# Patient Record
Sex: FEMALE | Race: WHITE | Employment: FULL TIME | URBAN - METROPOLITAN AREA
[De-identification: names, ages, dates, MRNs, and addresses within clinical notes are randomized per-mention and may not be internally consistent; named-entity substitution may affect disease eponyms.]

---

## 2017-01-04 ENCOUNTER — OFFICE VISIT (OUTPATIENT)
Dept: INTERNAL MEDICINE CLINIC | Age: 52
End: 2017-01-04

## 2017-01-04 VITALS
RESPIRATION RATE: 16 BRPM | BODY MASS INDEX: 35.42 KG/M2 | HEIGHT: 66 IN | OXYGEN SATURATION: 96 % | TEMPERATURE: 98.5 F | WEIGHT: 220.4 LBS | HEART RATE: 85 BPM | DIASTOLIC BLOOD PRESSURE: 72 MMHG | SYSTOLIC BLOOD PRESSURE: 118 MMHG

## 2017-01-04 DIAGNOSIS — Z12.39 SCREENING FOR BREAST CANCER: ICD-10-CM

## 2017-01-04 DIAGNOSIS — Z23 ENCOUNTER FOR IMMUNIZATION: ICD-10-CM

## 2017-01-04 DIAGNOSIS — E66.9 OBESITY (BMI 30-39.9): ICD-10-CM

## 2017-01-04 DIAGNOSIS — I10 ESSENTIAL HYPERTENSION: Primary | ICD-10-CM

## 2017-01-04 DIAGNOSIS — K50.80 CROHN'S DISEASE OF BOTH SMALL AND LARGE INTESTINE WITHOUT COMPLICATION (HCC): ICD-10-CM

## 2017-01-04 RX ORDER — LOSARTAN POTASSIUM 25 MG/1
TABLET ORAL
Refills: 0 | COMMUNITY
Start: 2016-12-16 | End: 2017-01-04 | Stop reason: SDUPTHER

## 2017-01-04 RX ORDER — LOSARTAN POTASSIUM 25 MG/1
25 TABLET ORAL DAILY
Qty: 90 TAB | Refills: 1 | Status: SHIPPED | OUTPATIENT
Start: 2017-01-04 | End: 2017-04-20 | Stop reason: SDUPTHER

## 2017-01-04 NOTE — MR AVS SNAPSHOT
Visit Information Date & Time Provider Department Dept. Phone Encounter #  
 1/4/2017 11:00 AM Vada Bumpers, MD [facility] [phone] [id number] Upcoming Health Maintenance Date Due  
 PAP AKA CERVICAL CYTOLOGY 11/13/2017 DTaP/Tdap/Td series (2 - Td) 1/18/2022 Allergies as of 1/4/2017  Review Complete On: 1/4/2017 By: Vada Bumpers, MD  
  
 Severity Noted Reaction Type Reactions Codeine  01/04/2017    Nausea and Vomiting  
 Sulfa (Sulfonamide Antibiotics)  01/04/2017    Other (comments) Makes face flush Current Immunizations  Never Reviewed No immunizations on file. Not reviewed this visit You Were Diagnosed With   
  
 Codes Comments Essential hypertension    -  Primary ICD-10-CM: I10 
ICD-9-CM: 401.9 Crohn's disease of both small and large intestine [facility]     AEF-89-UB: K50.80 ICD-9-CM: 555.2 Obesity (BMI 30-39. 9)     ICD-10-CM: E66.9 ICD-9-CM: 278.00 Screening for breast cancer     ICD-10-CM: Z12.39 
ICD-9-CM: V76.10 Vitals BP Pulse Temp Resp Height(growth percentile) Weight(growth percentile) 118/72 (BP 1 Location: Right arm, BP Patient Position: Sitting) 85 98.5 °F (36.9 °C) (Oral) 16 5' 6\" (1.676 m) 220 lb 6.4 oz (100 kg) LMP SpO2 BMI OB Status Smoking Status 12/14/2016 96% 35.57 kg/m2 Having regular periods Former Smoker BMI and BSA Data Body Mass Index Body Surface Area 35.57 kg/m 2 2.16 m 2 Preferred Pharmacy Pharmacy Name Phone [id number], [address] [phone] Your Updated Medication List  
  
   
This list is accurate as of: 1/4/17 12:03 PM.  Always use your most recent med list.  
  
  
  
  
 losartan 25 mg tablet Commonly known as:  COZAAR Take 1 Tab by mouth daily for 90 days. Prescriptions Sent to Pharmacy  Refills  
 losartan (COZAAR) 25 mg tablet 1  
 Sig: Take 1 Tab by mouth daily for 90 days. Class: Normal  
 Pharmacy: [facility] [address] Ph #: [phone] Route: Oral  
  
To-Do List   
 01/04/2017 Imaging:  [facility] MAMMO BI SCREENING INCL CAD Referral Information Referral ID Referred By Referred To  
  
 [id number] G[facility] [address] [phone] 83 [place], [address] Visits Status Start Date End Date 1 New Request 1/4/17 1/4/18 If your referral has a status of pending review or denied, additional information will be sent to support the outcome of this decision. Introducing [facility]! Tamie Saez introduces [employer] patient portal. Now you can access parts of your medical record, email your doctor's office, and request medication refills online. 1. In your internet browser, go to [email or web address] 2. Click on the First Time User? Click Here link in the Sign In box. You will see the New Member Sign Up page. 3. Enter your [employer] Access Code exactly as it appears below. You will not need to use this code after youve completed the sign-up process. If you do not sign up before the expiration date, you must request a new code. · [employer] Access Code: [email or web address] Expires: 4/4/2017 12:02 PM 
 
4. Enter the last four digits of your Social Security Number (xxxx) and Date of Birth (mm/dd/yyyy) as indicated and click Submit. You will be taken to the next sign-up page. 5. Create a [employer] ID. This will be your My[employer]Points login ID and cannot be changed, so think of one that is secure and easy to remember. 6. Create a [employer] password. You can change your password at any time. 7. Enter your Password Reset Question and Answer. This can be used at a later time if you forget your password. 8. Enter your e-mail address. You will receive e-mail notification when new information is available in [address] 9. Click Sign Up. You can now view and download portions of your medical record. 10. Click the Download Summary menu link to download a portable copy of your medical information. If you have questions, please visit the Frequently Asked Questions section of the [employer] website. Remember, [employer] is NOT to be used for urgent needs. For medical emergencies, dial 911. Now available from your iPhone and Android! Please provide this summary of care documentation to your next provider. Your primary care clinician is listed as Chang Louise. If you have any questions after today's visit, please call [phone].

## 2017-01-04 NOTE — PROGRESS NOTES
Written by Shantel Winter, as dictated by Dr. Kayode Stroud MD.    Samanta Medeiros is a 54 y.o. female. HPI  The patient comes in today to establish care. She moved here from [place], [place] 6 months ago. She would like a refill of her HTN medication, Losartan. She denies any Hx of high cholesterol or diabetes. She needs a referral for a an ob/gyn for her pap smear and mammogram. Her last pap smear was 2 years ago and she is sexually active and all of her Pap smears have been normal.     She has Crohn's disease and she was on remicade for 8 years and she stopped taking the medication when she moved here. She has been sxs free since. She had a colonoscopy 3 years ago. She does exercise, she walks 1-1.5 miles 3 times a week. She has lost 7-8 lbs since she moved here and she weighs 220 lbs. She does not snore. She has not gotten a flu shot this year and she will get one today. She has had a recent TDAP, but she is unsure of the date. She does have seasonal allergies. She denies any heartburn, irregular BMs, or leg swelling. Patient Active Problem List   Diagnosis Code    Essential hypertension I10    Crohn's disease of both small and large intestine without complication ([facility] G46.47        No current outpatient prescriptions on file prior to visit. No current facility-administered medications on file prior to visit. Allergies   Allergen Reactions    Codeine Nausea and Vomiting    Sulfa (Sulfonamide Antibiotics) Other (comments)     Makes face flush       Past Medical History   Diagnosis Date    Hypertension        Past Surgical History   Procedure Laterality Date    Hx tonsil and adenoidectomy Bilateral     Hx appendectomy       1993    Hx cholecystectomy N/A          Social History     Social History    Marital status: SINGLE     Spouse name: N/A    Number of children: N/A    Years of education: N/A     Occupational History    Not on file.      Social History Main Topics    Smoking status: Former Smoker    Smokeless tobacco: Never Used    Alcohol use Yes    Drug use: No    Sexual activity: No     Other Topics Concern    Not on file     Social History Narrative    No narrative on file         Review of Systems   Constitutional: Negative for malaise/fatigue. HENT: Negative for congestion. Respiratory: Negative for cough and wheezing. Cardiovascular: Negative for chest pain and palpitations. Gastrointestinal: Negative for abdominal pain and heartburn. Musculoskeletal: Negative for joint pain and myalgias. Neurological: Negative for weakness and headaches. Visit Vitals    /72 (BP 1 Location: Right arm, BP Patient Position: Sitting)    Pulse 85    Temp 98.5 °F (36.9 °C) (Oral)    Resp 16    Ht 5' 6\" (1.676 m)    Wt 220 lb 6.4 oz (100 kg)    LMP 12/14/2016    SpO2 96%    BMI 35.57 kg/m2     Physical Exam   Constitutional: She is oriented to person, place, and time. No distress. Obesity   HENT:   Right Ear: External ear normal.   Left Ear: External ear normal.   Mouth/Throat: Oropharynx is clear and moist.   Eyes: Conjunctivae and EOM are normal. Right eye exhibits no discharge. Left eye exhibits no discharge. Neck: Normal range of motion. Neck supple. Cardiovascular: Normal rate and regular rhythm. Pulmonary/Chest: Effort normal and breath sounds normal. She has no wheezes. Abdominal: Soft. Bowel sounds are normal. She exhibits no distension. Lymphadenopathy:     She has no cervical adenopathy. Neurological: She is alert and oriented to person, place, and time. Skin: Skin is intact. Psychiatric: She has a normal mood and affect. Nursing note and vitals reviewed. ASSESSMENT and PLAN    ICD-10-CM ICD-9-CM    1. Essential hypertension I10 401.9 losartan (COZAAR) 25 mg tablet sent to pharmacy. She is compliant on losartan and her BP is well controlled today at 118/72.  Refill sent to the pharmacy. Recommended getting blood work done before next refill. 2. Crohn's disease of both small and large intestine without complication ([facility] O56.87 555.2 REFERRAL TO GASTROENTEROLOGY    I discussed she should become established with a GI in order to help maintain if she has any flare ups. 3. Obesity (BMI 30-39. 9) E66.9 278.00 I discussed the importance of eating healthy and continuing with her exercise regimen. 4. Screening for breast cancer Z12.39 V76.10 [facility] MAMMO BI SCREENING INCL CAD    I placed an order for her mammogram. I discussed she can follow with an ob/gyn in the future if she would like. This plan was reviewed with the patient and patient agrees. All questions were answered. This scribe documentation was reviewed by me and accurately reflects the examination and decisions made by me. This note will not be viewable in [address]

## 2017-04-20 DIAGNOSIS — I10 ESSENTIAL HYPERTENSION: ICD-10-CM

## 2017-04-21 RX ORDER — LOSARTAN POTASSIUM 25 MG/1
25 TABLET ORAL DAILY
Qty: 90 TAB | Refills: 1 | Status: SHIPPED | OUTPATIENT
Start: 2017-04-21 | End: 2017-07-20

## 2017-08-03 ENCOUNTER — OFFICE VISIT (OUTPATIENT)
Dept: INTERNAL MEDICINE CLINIC | Age: 52
End: 2017-08-03

## 2017-08-03 VITALS
RESPIRATION RATE: 15 BRPM | HEART RATE: 78 BPM | HEIGHT: 66 IN | OXYGEN SATURATION: 98 % | DIASTOLIC BLOOD PRESSURE: 86 MMHG | BODY MASS INDEX: 35.39 KG/M2 | SYSTOLIC BLOOD PRESSURE: 118 MMHG | WEIGHT: 220.2 LBS | TEMPERATURE: 98.9 F

## 2017-08-03 DIAGNOSIS — K50.118 CROHN'S COLITIS, OTHER COMPLICATION (HCC): ICD-10-CM

## 2017-08-03 DIAGNOSIS — Z12.39 SCREENING FOR MALIGNANT NEOPLASM OF BREAST: ICD-10-CM

## 2017-08-03 DIAGNOSIS — D25.9 UTERINE LEIOMYOMA, UNSPECIFIED LOCATION: ICD-10-CM

## 2017-08-03 DIAGNOSIS — N89.8 VAGINAL DISCHARGE: Primary | ICD-10-CM

## 2017-08-03 PROBLEM — K50.10 CROHN'S COLITIS (HCC): Status: ACTIVE | Noted: 2017-08-03

## 2017-08-03 PROBLEM — K50.10 CROHN'S COLITIS (HCC): Chronic | Status: ACTIVE | Noted: 2017-08-03

## 2017-08-03 RX ORDER — LOSARTAN POTASSIUM 25 MG/1
TABLET ORAL
Refills: 1 | COMMUNITY
Start: 2017-07-23 | End: 2017-10-19 | Stop reason: SDUPTHER

## 2017-08-03 RX ORDER — PREDNISONE 5 MG/1
TABLET ORAL
Refills: 0 | Status: ON HOLD | COMMUNITY
Start: 2017-07-26 | End: 2017-08-13

## 2017-08-03 RX ORDER — MESALAMINE 1.2 G/1
TABLET, DELAYED RELEASE ORAL
Refills: 11 | Status: ON HOLD | COMMUNITY
Start: 2017-07-26 | End: 2017-08-13

## 2017-08-03 NOTE — PROGRESS NOTES
This note will not be viewable in [address] Gricelda Kruse is a  50 y.o. female presents for visit. Chief Complaint   Patient presents with   [employer] Other     has discharge for about 2.5 weeks, varies in appearance reddish brown, pink tinge to milky appearance. started when patient was on vacation and states that she has a big fibroid. Vaginal Discharge    The history is provided by the patient. This is a new problem. The current episode started more than 1 week ago. The problem occurs constantly. The problem has not changed since onset. The discharge occurs spontaneously and at rest. The discharge was white, milky, thick, scant, bloody, brown, dark, clear and mucoid. She is not pregnant. She has not missed her period. Associated symptoms include abdominal pain (flare of crohns disease). Pertinent negatives include no anorexia, no diaphoresis, no fever, no abdominal swelling, no constipation, no diarrhea, no nausea, no vomiting, no dyspareunia, no dysuria, no frequency, no genital burning, no genital itching, no genital lesions, no perineal pain, no perineal odor and no painful intercourse. She has tried nothing for the symptoms. The treatment provided no relief. Her past medical history does not include irregular periods, PID, STD, ectopic pregnancy, ovarian cysts or infertility. Review of Systems   Constitutional: Positive for malaise/fatigue. Negative for chills, diaphoresis, fever and weight loss. Eyes: Negative for blurred vision. Respiratory: Negative for cough. Cardiovascular: Negative for chest pain. Gastrointestinal: Positive for abdominal pain (flare of crohns disease). Negative for anorexia, constipation, diarrhea, nausea and vomiting. Genitourinary: Positive for vaginal discharge. Negative for dyspareunia, dysuria, flank pain and frequency. Musculoskeletal: Positive for myalgias. Skin: Negative for itching and rash.    Neurological: Negative for dizziness, tingling, tremors, sensory change, speech change and weakness. Psychiatric/Behavioral: Negative for depression. Visit Vitals    /86 (BP 1 Location: Left arm, BP Patient Position: Sitting)    Pulse 78    Temp 98.9 °F (37.2 °C) (Oral)    Resp 15    Ht 5' 6\" (1.676 m)    Wt 220 lb 3.2 oz (99.9 kg)    LMP 07/13/2017  Comment: states it is every other month now. and shorter    SpO2 98%    BMI 35.54 kg/m2     Physical Exam   Constitutional: She is oriented to person, place, and time. She appears well-developed and well-nourished. No distress. HENT:   Head: Normocephalic and atraumatic. Right Ear: External ear normal.   Left Ear: External ear normal.   Eyes: Conjunctivae are normal. Right eye exhibits no discharge. Left eye exhibits no discharge. No scleral icterus. Neck: Neck supple. Cardiovascular: Normal rate. Pulmonary/Chest: Effort normal and breath sounds normal. No respiratory distress. Abdominal: Soft. Bowel sounds are normal. She exhibits no distension. There is no tenderness. Genitourinary: Vaginal discharge found. Musculoskeletal: She exhibits no edema or tenderness. Neurological: She is alert and oriented to person, place, and time. Skin: Skin is warm and dry. She is not diaphoretic. Psychiatric: She has a normal mood and affect. Her behavior is normal. Judgment and thought content normal.             No results found for this or any previous visit (from the past 24 hour(s)). Patient Active Problem List    Diagnosis Date Noted    Uterine leiomyoma 2010, 2015 08/03/2017    Crohn's colitis [facility]) fissure 2007 08/03/2017    Essential hypertension 01/04/2017    Crohn's disease of both small and large intestine without complication ([facility] 28/03/2024         ASSESSMENT AND PLAN:      ICD-10-CM ICD-9-CM   1. Vaginal discharge N89.8 623.5   2. Screening for malignant neoplasm of breast Z12.39 V76.10   3. Uterine leiomyoma, unspecified location D25.9 218.9   4.  Crohn's colitis, other complication ([facility] L50.453 555.1     Orders Placed This Encounter    [facility] MAMMOGRAM SCREENING DIGITAL BILAT     Standing Status:   Future     Standing Expiration Date:   9/3/2018     Order Specific Question:   Reason for Exam     Answer:   Breast cancer screening     Order Specific Question:   Is Patient Pregnant? Answer:   No    NUSWAB VAGINOSIS + CANDIDA    REFERRAL TO OBSTETRICS AND GYNECOLOGY     Referral Priority:   Routine     Referral Type:   Consultation     Referral Reason:   Specialty Services Required     Referral Location:   [address] [place] [address]     Referred to Provider:   Lavon Barrera MD     Requested Specialty:   Obstetrics & Gynecology    predniSONE (DELTASONE) 5 mg tablet     Sig: TAKE 6 TABS BY MOUTH DAILY FOR 1 WEEK, THEN DROP BY 1 TAB DAILY EVERY WEEK UNTIL FINISHED     Refill:  0    LIALDA 1.2 gram delayed release tablet     Sig: TAKE 2 TABLETS BY MOUTH EVERY DAY AS DIRECTED     Refill:  11    losartan (COZAAR) 25 mg tablet     Sig: TAKE 1 TAB BY MOUTH DAILY FOR 90 DAYS. Refill:  1       Diagnoses and all orders for this visit:    1. Vaginal discharge afebrile, likely due to fibroids vs Crohns   -     REFERRAL TO OBSTETRICS AND GYNECOLOGY  -     NUSWAB VAGINOSIS + CANDIDA    2. Screening for malignant neoplasm of breast  -     [facility] MAMMOGRAM SCREENING DIGITAL BILAT; Future    3. Uterine leiomyoma, unspecified location  -     REFERRAL TO OBSTETRICS AND GYNECOLOGY    4. Crohn's colitis, other complication ([facility]  history of fissure,   Risk of fistula. And worsening fibroids. lab results and schedule of future lab studies reviewed with patient  radiology results and schedule of future radiology studies reviewed with patient    Patient advised to follow-up after she sees her OB/GYN and her GI doctor. Patient advised to have her GI doctor sent all laboratory data and paperwork to the office.   I informed her of any alarm symptoms such as worsening bleeding noticed of fecal matter as the risk of a colovaginal fistula. She voiced understanding. Follow-up Disposition:  Return in about 3 months (around 11/3/2017), or if symptoms worsen or fail to improve, for Chronic medical problems follow up. Disclaimer:  Advised her to call back or return to office if symptoms worsen/change/persist.  Discussed expected course/resolution/complications of diagnosis in detail with patient. Medication risks/benefits/alternatives discussed with patient. She was given an after visit summary which includes diagnoses, current medications, & vitals. Discussed patient instructions and advised to read to all patient instructions regarding care. She expressed understanding with the diagnosis and plan.

## 2017-08-03 NOTE — PROGRESS NOTES
Chief Complaint   Patient presents with   [employer] Other     has discharge for about 2.5 weeks, varies in appearance reddish brown, pink tinge to milky appearance. started when patient was on vacation and states that she has a big fibroid.

## 2017-08-08 DIAGNOSIS — B96.89 BACTERIAL VAGINOSIS: Primary | ICD-10-CM

## 2017-08-08 DIAGNOSIS — N76.0 BACTERIAL VAGINOSIS: Primary | ICD-10-CM

## 2017-08-08 LAB
A VAGINAE DNA VAG QL NAA+PROBE: ABNORMAL SCORE
BVAB2 DNA VAG QL NAA+PROBE: ABNORMAL SCORE
C ALBICANS DNA VAG QL NAA+PROBE: NEGATIVE
C GLABRATA DNA VAG QL NAA+PROBE: NEGATIVE
MEGA1 DNA VAG QL NAA+PROBE: ABNORMAL SCORE

## 2017-08-08 RX ORDER — METRONIDAZOLE 500 MG/1
500 TABLET ORAL 3 TIMES DAILY
Qty: 21 TAB | Refills: 0 | Status: ON HOLD | OUTPATIENT
Start: 2017-08-08 | End: 2017-08-13

## 2017-08-08 NOTE — PROGRESS NOTES
Spoke with pt and she voiced understanding. She saw GYN md last week who stated she has a good size fibroid present so she is following up with them. Stated if she doesn't feel better by Friday to call back. She voiced understanding.

## 2017-08-08 NOTE — PROGRESS NOTES
Ms Tamra Lesch,     I will start you on treatment for Bacterial vaginosis. Please start flagyl 500 mg po tid for 7 days and inform your GE doctor. Please call with questions and concerns.      Kindly,

## 2017-08-11 ENCOUNTER — HOSPITAL ENCOUNTER (EMERGENCY)
Age: 52
Discharge: HOME | End: 2017-08-11
Attending: EMERGENCY MEDICINE
Payer: COMMERCIAL

## 2017-08-11 ENCOUNTER — APPOINTMENT (OUTPATIENT)
Dept: CT IMAGING | Age: 52
End: 2017-08-11
Attending: EMERGENCY MEDICINE
Payer: COMMERCIAL

## 2017-08-11 VITALS
DIASTOLIC BLOOD PRESSURE: 73 MMHG | HEIGHT: 66 IN | HEART RATE: 86 BPM | SYSTOLIC BLOOD PRESSURE: 115 MMHG | TEMPERATURE: 98.4 F | WEIGHT: 212 LBS | OXYGEN SATURATION: 98 % | BODY MASS INDEX: 34.07 KG/M2 | RESPIRATION RATE: 18 BRPM

## 2017-08-11 DIAGNOSIS — K57.33 DIVERTICULITIS OF LARGE INTESTINE WITHOUT PERFORATION OR ABSCESS WITH BLEEDING: Primary | ICD-10-CM

## 2017-08-11 LAB
ALBUMIN SERPL BCP-MCNC: 3 G/DL (ref 3.5–5)
ALBUMIN/GLOB SERPL: 0.8 {RATIO} (ref 1.1–2.2)
ALP SERPL-CCNC: 54 U/L (ref 45–117)
ALT SERPL-CCNC: 32 U/L (ref 12–78)
ANION GAP BLD CALC-SCNC: 7 MMOL/L (ref 5–15)
APPEARANCE UR: CLEAR
AST SERPL W P-5'-P-CCNC: 10 U/L (ref 15–37)
BACTERIA URNS QL MICRO: NEGATIVE /HPF
BASOPHILS # BLD AUTO: 0 K/UL (ref 0–0.1)
BASOPHILS # BLD: 0 % (ref 0–1)
BILIRUB SERPL-MCNC: 0.7 MG/DL (ref 0.2–1)
BILIRUB UR QL: NEGATIVE
BUN SERPL-MCNC: 9 MG/DL (ref 6–20)
BUN/CREAT SERPL: 9 (ref 12–20)
CALCIUM SERPL-MCNC: 8.9 MG/DL (ref 8.5–10.1)
CHLORIDE SERPL-SCNC: 103 MMOL/L (ref 97–108)
CK MB CFR SERPL CALC: ABNORMAL % (ref 0–2.5)
CK MB SERPL-MCNC: <1 NG/ML (ref 5–25)
CK SERPL-CCNC: 13 U/L (ref 26–192)
CO2 SERPL-SCNC: 29 MMOL/L (ref 21–32)
COLOR UR: ABNORMAL
CREAT SERPL-MCNC: 0.99 MG/DL (ref 0.55–1.02)
EOSINOPHIL # BLD: 0 K/UL (ref 0–0.4)
EOSINOPHIL NFR BLD: 0 % (ref 0–7)
EPITH CASTS URNS QL MICRO: ABNORMAL /LPF
ERYTHROCYTE [DISTWIDTH] IN BLOOD BY AUTOMATED COUNT: 12.6 % (ref 11.5–14.5)
GLOBULIN SER CALC-MCNC: 3.6 G/DL (ref 2–4)
GLUCOSE SERPL-MCNC: 175 MG/DL (ref 65–100)
GLUCOSE UR STRIP.AUTO-MCNC: NEGATIVE MG/DL
HCG UR QL: NEGATIVE
HCT VFR BLD AUTO: 37.5 % (ref 35–47)
HEMOCCULT STL QL: NEGATIVE
HGB BLD-MCNC: 12.3 G/DL (ref 11.5–16)
HGB UR QL STRIP: ABNORMAL
INR PPP: 1.1 (ref 0.9–1.1)
KETONES UR QL STRIP.AUTO: NEGATIVE MG/DL
LACTATE SERPL-SCNC: 1.5 MMOL/L (ref 0.4–2)
LEUKOCYTE ESTERASE UR QL STRIP.AUTO: ABNORMAL
LIPASE SERPL-CCNC: 138 U/L (ref 73–393)
LYMPHOCYTES # BLD AUTO: 3 % (ref 12–49)
LYMPHOCYTES # BLD: 0.5 K/UL (ref 0.8–3.5)
MCH RBC QN AUTO: 30.9 PG (ref 26–34)
MCHC RBC AUTO-ENTMCNC: 32.8 G/DL (ref 30–36.5)
MCV RBC AUTO: 94.2 FL (ref 80–99)
MONOCYTES # BLD: 0.3 K/UL (ref 0–1)
MONOCYTES NFR BLD AUTO: 2 % (ref 5–13)
NEUTS SEG # BLD: 14.7 K/UL (ref 1.8–8)
NEUTS SEG NFR BLD AUTO: 95 % (ref 32–75)
NITRITE UR QL STRIP.AUTO: NEGATIVE
PH UR STRIP: 6.5 [PH] (ref 5–8)
PLATELET # BLD AUTO: 285 K/UL (ref 150–400)
POTASSIUM SERPL-SCNC: 3.6 MMOL/L (ref 3.5–5.1)
PROT SERPL-MCNC: 6.6 G/DL (ref 6.4–8.2)
PROT UR STRIP-MCNC: NEGATIVE MG/DL
PROTHROMBIN TIME: 11.1 SEC (ref 9–11.1)
RBC # BLD AUTO: 3.98 M/UL (ref 3.8–5.2)
RBC #/AREA URNS HPF: ABNORMAL /HPF (ref 0–5)
RBC MORPH BLD: ABNORMAL
SODIUM SERPL-SCNC: 139 MMOL/L (ref 136–145)
SP GR UR REFRACTOMETRY: 1 (ref 1–1.03)
TROPONIN I SERPL-MCNC: <0.04 NG/ML
UROBILINOGEN UR QL STRIP.AUTO: 0.2 EU/DL (ref 0.2–1)
WBC # BLD AUTO: 15.5 K/UL (ref 3.6–11)
WBC URNS QL MICRO: ABNORMAL /HPF (ref 0–4)

## 2017-08-11 PROCEDURE — 81001 URINALYSIS AUTO W/SCOPE: CPT | Performed by: EMERGENCY MEDICINE

## 2017-08-11 PROCEDURE — 74011250636 HC RX REV CODE- 250/636: Performed by: EMERGENCY MEDICINE

## 2017-08-11 PROCEDURE — 74011636320 HC RX REV CODE- 636/320: Performed by: RADIOLOGY

## 2017-08-11 PROCEDURE — 96361 HYDRATE IV INFUSION ADD-ON: CPT

## 2017-08-11 PROCEDURE — 81025 URINE PREGNANCY TEST: CPT | Performed by: EMERGENCY MEDICINE

## 2017-08-11 PROCEDURE — 74011250637 HC RX REV CODE- 250/637: Performed by: EMERGENCY MEDICINE

## 2017-08-11 PROCEDURE — 82272 OCCULT BLD FECES 1-3 TESTS: CPT | Performed by: EMERGENCY MEDICINE

## 2017-08-11 PROCEDURE — 74177 CT ABD & PELVIS W/CONTRAST: CPT

## 2017-08-11 PROCEDURE — 80053 COMPREHEN METABOLIC PANEL: CPT | Performed by: EMERGENCY MEDICINE

## 2017-08-11 PROCEDURE — 83605 ASSAY OF LACTIC ACID: CPT | Performed by: EMERGENCY MEDICINE

## 2017-08-11 PROCEDURE — 82550 ASSAY OF CK (CPK): CPT | Performed by: EMERGENCY MEDICINE

## 2017-08-11 PROCEDURE — 83690 ASSAY OF LIPASE: CPT | Performed by: EMERGENCY MEDICINE

## 2017-08-11 PROCEDURE — 85025 COMPLETE CBC W/AUTO DIFF WBC: CPT | Performed by: EMERGENCY MEDICINE

## 2017-08-11 PROCEDURE — 99284 EMERGENCY DEPT VISIT MOD MDM: CPT

## 2017-08-11 PROCEDURE — 36415 COLL VENOUS BLD VENIPUNCTURE: CPT | Performed by: EMERGENCY MEDICINE

## 2017-08-11 PROCEDURE — 74178 CT ABD&PLV WO CNTR FLWD CNTR: CPT

## 2017-08-11 PROCEDURE — 84484 ASSAY OF TROPONIN QUANT: CPT | Performed by: EMERGENCY MEDICINE

## 2017-08-11 PROCEDURE — 85610 PROTHROMBIN TIME: CPT | Performed by: EMERGENCY MEDICINE

## 2017-08-11 PROCEDURE — 96374 THER/PROPH/DIAG INJ IV PUSH: CPT

## 2017-08-11 RX ORDER — CIPROFLOXACIN 500 MG/1
500 TABLET ORAL
Status: COMPLETED | OUTPATIENT
Start: 2017-08-11 | End: 2017-08-11

## 2017-08-11 RX ORDER — KETOROLAC TROMETHAMINE 30 MG/ML
15 INJECTION, SOLUTION INTRAMUSCULAR; INTRAVENOUS
Status: COMPLETED | OUTPATIENT
Start: 2017-08-11 | End: 2017-08-11

## 2017-08-11 RX ORDER — METRONIDAZOLE 250 MG/1
500 TABLET ORAL
Status: COMPLETED | OUTPATIENT
Start: 2017-08-11 | End: 2017-08-11

## 2017-08-11 RX ORDER — FENTANYL CITRATE 50 UG/ML
50 INJECTION, SOLUTION INTRAMUSCULAR; INTRAVENOUS
Status: DISCONTINUED | OUTPATIENT
Start: 2017-08-11 | End: 2017-08-11 | Stop reason: HOSPADM

## 2017-08-11 RX ORDER — METRONIDAZOLE 500 MG/1
500 TABLET ORAL 3 TIMES DAILY
Qty: 12 TAB | Refills: 0 | Status: ON HOLD | OUTPATIENT
Start: 2017-08-11 | End: 2017-08-13

## 2017-08-11 RX ORDER — CIPROFLOXACIN 500 MG/1
500 TABLET ORAL 2 TIMES DAILY
Qty: 14 TAB | Refills: 0 | Status: ON HOLD | OUTPATIENT
Start: 2017-08-11 | End: 2017-08-13

## 2017-08-11 RX ADMIN — METRONIDAZOLE 500 MG: 250 TABLET, FILM COATED ORAL at 19:57

## 2017-08-11 RX ADMIN — IOPAMIDOL 100 ML: 755 INJECTION, SOLUTION INTRAVENOUS at 18:25

## 2017-08-11 RX ADMIN — CIPROFLOXACIN 500 MG: 500 TABLET, FILM COATED ORAL at 19:58

## 2017-08-11 RX ADMIN — KETOROLAC TROMETHAMINE 15 MG: 30 INJECTION, SOLUTION INTRAMUSCULAR at 19:41

## 2017-08-11 RX ADMIN — SODIUM CHLORIDE 1000 ML: 900 INJECTION, SOLUTION INTRAVENOUS at 17:38

## 2017-08-11 NOTE — DISCHARGE INSTRUCTIONS
Diverticulitis: Care Instructions  Your Care Instructions    Diverticulitis occurs when pouches form in the wall of the colon and become inflamed or infected. It can be very painful. Doctors aren't sure what causes diverticulitis. There is no proof that foods such as nuts, seeds, or berries cause it or make it worse. A low-fiber diet may cause the colon to work harder to push stool forward. Pouches may form because of this extra work. It may be hard to think about healthy eating while you're in pain. But as you recover, you might think about how you can use healthy eating for overall better health. Healthy eating may help you avoid future attacks. Follow-up care is a key part of your treatment and safety. Be sure to make and go to all appointments, and call your doctor if you are having problems. It's also a good idea to know your test results and keep a list of the medicines you take. How can you care for yourself at home? · Drink plenty of fluids, enough so that your urine is light yellow or clear like water. If you have kidney, heart, or liver disease and have to limit fluids, talk with your doctor before you increase the amount of fluids you drink. · Stick to liquids or a bland diet (plain rice, bananas, dry toast or crackers, applesauce) until you are feeling better. Then you can return to regular foods and gradually increase the amount of fiber in your diet. · Use a heating pad set on low on your belly to relieve mild cramps and pain. · Get extra rest until you are feeling better. · Be safe with medicines. Read and follow all instructions on the label. ¨ If the doctor gave you a prescription medicine for pain, take it as prescribed. ¨ If you are not taking a prescription pain medicine, ask your doctor if you can take an over-the-counter medicine. · If your doctor prescribed antibiotics, take them as directed. Do not stop taking them just because you feel better.  You need to take the full course of antibiotics. To prevent future attacks of diverticulitis  · Avoid constipation:  ¨ Include fruits, vegetables, beans, and whole grains in your diet each day. These foods are high in fiber. ¨ Drink plenty of fluids, enough so that your urine is light yellow or clear like water. If you have kidney, heart, or liver disease and have to limit fluids, talk with your doctor before you increase the amount of fluids you drink. ¨ Get some exercise every day. Build up slowly to 30 to 60 minutes a day on 5 or more days of the week. ¨ Take a fiber supplement, such as Citrucel or Metamucil, every day if needed. Read and follow all instructions on the label. ¨ Schedule time each day for a bowel movement. Having a daily routine may help. Take your time and do not strain when having a bowel movement. When should you call for help? Call 911 anytime you think you may need emergency care. For example, call if:  · You passed out (lost consciousness). · You vomit blood or what looks like coffee grounds. · You pass maroon or very bloody stools. Call your doctor now or seek immediate medical care if:  · You have severe pain or swelling in your belly. · You have a new or higher fever. · You cannot keep down fluids or medicines. · You have new pain that gets worse when you move or cough. Watch closely for changes in your health, and be sure to contact your doctor if:  · The symptoms you had when you first started feeling sick come back. · You do not get better as expected. Where can you learn more? Go to http://indira-guevara.info/. Enter H901 in the search box to learn more about \"Diverticulitis: Care Instructions. \"  Current as of: August 9, 2016  Content Version: 11.3  © 6843-8707 [employer]. Care instructions adapted under license by [employer] (which disclaims liability or warranty for this information).  If you have questions about a medical condition or this instruction, always ask your healthcare professional. [address] any warranty or liability for your use of this information. Lower Gastrointestinal Bleeding: Care Instructions  Your Care Instructions    The digestive or gastrointestinal tract goes from the mouth to the anus. It is often called the GI tract. Bleeding in the lower GI tract can happen anywhere in your small or large intestine. It can also happen in your rectum or anus. In some cases, it is caused by an infection, cancer, or inflammatory bowel disease. Or it may be caused by hemorrhoids, diverticulitis, or clotting problems. Light bleeding may not cause any symptoms at first. But if you continue to bleed for a while, you may feel very weak or tired. Sudden, heavy bleeding means you need to see a doctor right away. This kind of bleeding can be very dangerous. But it can usually be cured or controlled. The doctor may do some tests to find the cause of your bleeding. Follow-up care is a key part of your treatment and safety. Be sure to make and go to all appointments, and call your doctor if you are having problems. It's also a good idea to know your test results and keep a list of the medicines you take. How can you care for yourself at home? · Be safe with medicines. Take your medicines exactly as prescribed. Call your doctor if you think you are having a problem with your medicine. You will get more details on the specific medicines your doctor prescribes. · Do not take aspirin or other anti-inflammatory medicines, such as naproxen (Aleve) or ibuprofen (Advil, Motrin), without talking to your doctor first. Ask your doctor if it is okay to use acetaminophen (Tylenol). · Do not drink alcohol. · The bleeding may make you lose iron. So it's important to eat foods that have a lot of iron. These include red meat, shellfish, poultry, and eggs.  They also include beans, raisins, whole-grain breads, and leafy green vegetables. If you want help planning meals, you can meet with a dietitian. When should you call for help? Call 911 anytime you think you may need emergency care. For example, call if:  · You have sudden, severe belly pain. · You vomit blood or what looks like coffee grounds. · You passed out (lost consciousness). · Your stools are maroon or very bloody. Call your doctor now or seek immediate medical care if:  · You are dizzy or lightheaded, or you feel like you may faint. · Your stools are black and look like tar, or they have streaks of blood. · You have belly pain. · You vomit or have nausea. Watch closely for changes in your health, and be sure to contact your doctor if you do not get better as expected. Where can you learn more? Go to http://indira-guevara.info/. Enter T059 in the search box to learn more about \"Lower Gastrointestinal Bleeding: Care Instructions. \"  Current as of: March 20, 2017  Content Version: 11.3  © 1123-0226 [employer]. Care instructions adapted under license by [employer] (which disclaims liability or warranty for this information). If you have questions about a medical condition or this instruction, always ask your healthcare professional. [address] any warranty or liability for your use of this information. We hope that we have addressed all of your medical concerns. The examination and treatment you received in the Emergency Department were for an emergent problem and were not intended as complete care. It is important that you follow up with your healthcare provider(s) for ongoing care. If your symptoms worsen or do not improve as expected, and you are unable to reach your usual health care provider(s), you should return to the Emergency Department.       Today's healthcare is undergoing tremendous change, and patient satisfaction surveys are one of the many tools to assess the quality of medical care.  You may receive a survey from the [employer] regarding your experience in the Emergency Department. I hope that your experience has been completely positive, particularly the medical care that I provided. As such, please participate in the survey; anything less than excellent does not meet my expectations or intentions. [facility] and [employer] participate in nationally recognized quality of care measures. If your blood pressure is greater than 120/80, as reported below, we urge that you seek medical care to address the potential of high blood pressure, commonly known as hypertension. Hypertension can be hereditary or can be caused by certain medical conditions, pain, stress, or \"white coat syndrome. \"       Please make an appointment with your health care provider(s) for follow up of your Emergency Department visit. VITALS:   Patient Vitals for the past 8 hrs:   Temp Pulse Resp BP SpO2   08/11/17 1945 - - - 115/73 98 %   08/11/17 1814 - - - - 98 %   08/11/17 1647 98.4 °F (36.9 °C) 86 18 (!) 139/91 98 %          Thank you for allowing us to provide you with medical care today. We realize that you have many choices for your emergency care needs. Please choose us in the future for any continued health care needs. [address], [address] [facility].   Office: [phone]            Recent Results (from the past 24 hour(s))   OCCULT BLOOD, STOOL    Collection Time: 08/11/17  5:19 PM   Result Value Ref Range    Occult blood, stool NEGATIVE  NEG     TROPONIN I    Collection Time: 08/11/17  5:32 PM   Result Value Ref Range    Troponin-I, Qt. <0.04 <0.05 ng/mL   PROTHROMBIN TIME + INR    Collection Time: 08/11/17  5:32 PM   Result Value Ref Range    INR 1.1 0.9 - 1.1      Prothrombin time 11.1 9.0 - 11.1 sec   LIPASE    Collection Time: 08/11/17  5:32 PM   Result Value Ref Range    Lipase 138 73 - 393 U/L   LACTIC ACID    Collection Time: 08/11/17  5:32 PM   Result Value Ref Range    Lactic acid 1.5 0.4 - 2.0 MMOL/L   METABOLIC PANEL, COMPREHENSIVE    Collection Time: 08/11/17  5:32 PM   Result Value Ref Range    Sodium 139 136 - 145 mmol/L    Potassium 3.6 3.5 - 5.1 mmol/L    Chloride 103 97 - 108 mmol/L    CO2 29 21 - 32 mmol/L    Anion gap 7 5 - 15 mmol/L    Glucose 175 (H) 65 - 100 mg/dL    BUN 9 6 - 20 MG/DL    Creatinine 0.99 0.55 - 1.02 MG/DL    BUN/Creatinine ratio 9 (L) 12 - 20      GFR est AA >60 >60 ml/min/1.73m2    GFR est non-AA 59 (L) >60 ml/min/1.73m2    Calcium 8.9 8.5 - 10.1 MG/DL    Bilirubin, total 0.7 0.2 - 1.0 MG/DL    ALT (SGPT) 32 12 - 78 U/L    AST (SGOT) 10 (L) 15 - 37 U/L    Alk. phosphatase 54 45 - 117 U/L    Protein, total 6.6 6.4 - 8.2 g/dL    Albumin 3.0 (L) 3.5 - 5.0 g/dL    Globulin 3.6 2.0 - 4.0 g/dL    A-G Ratio 0.8 (L) 1.1 - 2.2     CK W/ CKMB & INDEX    Collection Time: 08/11/17  5:32 PM   Result Value Ref Range    CK 13 (L) 26 - 192 U/L    CK - MB <1.0 <3.6 NG/ML    CK-MB Index Cannot be calculated 0 - 2.5     CBC WITH AUTOMATED DIFF    Collection Time: 08/11/17  5:32 PM   Result Value Ref Range    WBC 15.5 (H) 3.6 - 11.0 K/uL    RBC 3.98 3.80 - 5.20 M/uL    HGB 12.3 11.5 - 16.0 g/dL    HCT 37.5 35.0 - 47.0 %    MCV 94.2 80.0 - 99.0 FL    MCH 30.9 26.0 - 34.0 PG    MCHC 32.8 30.0 - 36.5 g/dL    RDW 12.6 11.5 - 14.5 %    PLATELET 635 521 - 983 K/uL    NEUTROPHILS 95 (H) 32 - 75 %    LYMPHOCYTES 3 (L) 12 - 49 %    MONOCYTES 2 (L) 5 - 13 %    EOSINOPHILS 0 0 - 7 %    BASOPHILS 0 0 - 1 %    ABS. NEUTROPHILS 14.7 (H) 1.8 - 8.0 K/UL    ABS. LYMPHOCYTES 0.5 (L) 0.8 - 3.5 K/UL    ABS. MONOCYTES 0.3 0.0 - 1.0 K/UL    ABS. EOSINOPHILS 0.0 0.0 - 0.4 K/UL    ABS.  BASOPHILS 0.0 0.0 - 0.1 K/UL    RBC COMMENTS NORMOCYTIC, NORMOCHROMIC     URINALYSIS W/MICROSCOPIC    Collection Time: 08/11/17  7:06 PM   Result Value Ref Range    Color YELLOW/STRAW      Appearance CLEAR CLEAR      Specific gravity 1.005 1.003 - 1.030      pH (UA) 6.5 5.0 - 8.0      Protein NEGATIVE  NEG mg/dL    Glucose NEGATIVE  NEG mg/dL    Ketone NEGATIVE  NEG mg/dL    Bilirubin NEGATIVE  NEG      Blood SMALL (A) NEG      Urobilinogen 0.2 0.2 - 1.0 EU/dL    Nitrites NEGATIVE  NEG      Leukocyte Esterase SMALL (A) NEG      WBC PENDING /hpf    RBC PENDING /hpf    Epithelial cells PENDING /lpf    Bacteria PENDING /hpf   HCG URINE, QL    Collection Time: 08/11/17  7:06 PM   Result Value Ref Range    HCG urine, Ql. NEGATIVE  NEG         Ct Abd Pelv W Wo Cont    Result Date: 8/11/2017  EXAM:  CT ABD PELV W WO CONT INDICATION:   passing tissue/ blood w/ diarrhea/ h/o Crohn's COMPARISON: None. CONTRAST:  100 mL of Isovue-370. TECHNIQUE:  Multislice helical CT was performed from the diaphragm to the iliac crest prior to intravenous contrast administration and from the diaphragm to the symphysis pubis during uneventful rapid bolus intravenous contrast administration. Arterial, portal venous, and equilibrium phases were obtained. Oral contrast was not administered. Contiguous 5 mm axial images were reconstructed and lung and soft tissue windows were generated. Coronal and sagittal reformations were generated. CT dose reduction was achieved through use of a standardized protocol tailored for this examination and automatic exposure control for dose modulation. FINDINGS: The visualized portions of the lung bases are clear. The precontrast images demonstrate no abnormal calcifications. . Following contrast administration, liver is unremarkable. There is no biliary dilatation. Previous cholecystectomy noted. The spleen is normal. Pancreas and adrenal glands are unremarkable. Kidneys demonstrate no mass or hydronephrosis. The aorta tapers without aneurysm. There is no retroperitoneal adenopathy or mass. There is no free intraperitoneal air. Trace of ascites noted in the pelvis. . Bowel demonstrates no evidence of GI blood loss.  There is a focal area of edema at the junction of the descending colon and sigmoid colon. Diverticuli noted in this region. Thickening of the bowel wall is also noted. This is most consistent with diverticulitis which is localized in this region. Note is made of patient's history of Crohn's disease. The possibility of a localized area of Crohn's disease cannot be totally excluded, however is thought to be far less likely. The terminal ileum is unremarkable. The appendix is absent. The study of the pelvis demonstrates moderately full urinary bladder without calcification. .   There is no pelvic mass or adenopathy. Osseous structures intact. IMPRESSION: Findings are most compatible with a segment of diverticulitis without evidence of perforation or drainable abscess at the junction of the descending colon and sigmoid colon. This should be followed to exclude other etiologies. This would be atypical for Crohn's disease since the remainder of the bowel is unremarkable. There is no evidence of GI blood loss. Tigist Galo

## 2017-08-11 NOTE — ED PROVIDER NOTES
HPI Comments: 50 y.o. female with past medical history significant for HTN and cholecystectomy who presents from home with chief complaint of blood in stool. Pt complains of \"bright red blood\" in stool with associated diarrhea, syncope (2x), light headedness, and decreased PO since she woke up this morning ~07:30. Pt describes seeing bright red blood with \"tissue\" like material in the toilet bowl with numerous BMs today. Pt reports decreased PO because shortly after eating she has been having diarrhea with blood in the stool and associated pain. Pt describes 2 episodes of syncope today. First episode occurred when pt was sitting on the toilet shortly after waking up; pt recalls being on the toilet and feeling light headed and then describes waking up on the bathroom floor. The second episode was similar but pt also became diaphoretic and chilled before syncope. Pt called her PCP to report sxs and was referred to the ED for further evaluation. Pt reports GI hx with more frequent problems over the past few months, pt is followed by Dr. Lesley Gamez. Pt reports hx of a pedunculated fibroid, and reports using a tampon to rule out that as source of bleeding. Pt denies fever, HA, CP, vomiting, or confusion. There are no other acute medical concerns at this time. Social hx: Former tobacco smoker; No EtOH use. PCP: Celia Bales MD  Gastroenterologist: Kathy Cifuentes DO    Note written by [facility]. Sharla Woodward, as dictated by Mary Kay Khan MD 4:55 PM      The history is provided by the patient. No  was used. Past Medical History:   Diagnosis Date    Hypertension        Past Surgical History:   Procedure Laterality Date    HX APPENDECTOMY      1993    HX CHOLECYSTECTOMY N/A     HX TONSIL AND ADENOIDECTOMY Bilateral          No family history on file.     Social History     Social History    Marital status: SINGLE     Spouse name: N/A    Number of children: N/A    Years of education: N/A     Occupational History    Not on file. Social History Main Topics    Smoking status: Former Smoker    Smokeless tobacco: Never Used    Alcohol use Yes    Drug use: No    Sexual activity: No     Other Topics Concern    Not on file     Social History Narrative         ALLERGIES: Codeine and Sulfa (sulfonamide antibiotics)    Review of Systems   Constitutional: Positive for chills and diaphoresis. Negative for fever. Cardiovascular: Negative for chest pain. Gastrointestinal: Positive for blood in stool and diarrhea. Negative for abdominal pain and vomiting. Genitourinary: Negative for dysuria and hematuria. Musculoskeletal: Negative for back pain and neck pain. Skin: Negative for color change and rash. Neurological: Negative for headaches. Psychiatric/Behavioral: Negative for confusion. All other systems reviewed and are negative. Vitals:    08/11/17 1647   BP: (!) 139/91   Pulse: 86   Resp: 18   Temp: 98.4 °F (36.9 °C)   SpO2: 98%   Weight: 96.2 kg (212 lb)   Height: 5' 6\" (1.676 m)            Physical Exam   Constitutional: She is oriented to person, place, and time. She appears well-developed and well-nourished. No distress. NAD, AxOx4, speaking in complete sentences     Eyes: Conjunctivae are normal. Pupils are equal, round, and reactive to light. Right eye exhibits no discharge. No scleral icterus. Neck: Normal range of motion. Neck supple. No JVD present. No tracheal deviation present. Cardiovascular: Normal rate, regular rhythm, normal heart sounds and intact distal pulses. Exam reveals no gallop and no friction rub. No murmur heard. Pulmonary/Chest: Effort normal and breath sounds normal. No respiratory distress. She has no wheezes. She has no rales. She exhibits no tenderness. Abdominal: Soft. Bowel sounds are normal. There is no tenderness. There is no rebound and no guarding. Min ttp     Neg peritoneal signs; Genitourinary: No vaginal discharge found. Genitourinary Comments: Pt denies urinary/ vaginal complaints   Musculoskeletal: Normal range of motion. She exhibits no edema or tenderness. Neurological: She is alert and oriented to person, place, and time. No cranial nerve deficit. She exhibits normal muscle tone. Coordination normal.   Skin: Skin is warm and dry. No rash noted. No erythema. No pallor. Psychiatric: She has a normal mood and affect. Her behavior is normal. Thought content normal.   Nursing note and vitals reviewed. [facility]  ED Course       Procedures    Procedure Note - Rectal Exam:   5:40 PM  Performed by: Levi Castillo MD  Chaperoned by: nurse  Rectal exam performed. no stool was collected. Stool was Hemoccult tested, and found to be heme Negative. No fissure/ hemorrhoid/ impaction  The procedure took 1-15 minutes, and pt tolerated well.      7:22 PM  Pt at CT;       7:50 PM 'feeling better now'; pt told results/ agrees w/ plans; GI follow-up as scheduled on Tuesday Dr Florentin Kerr; Pt adds 'already on flagyl (BV); will give additional 4d of flagyl/ cipro rx; Maspeth Epley  results have been reviewed with her. She has been counseled regarding her diagnosis. She verbally conveys understanding and agreement of the signs, symptoms, diagnosis, treatment and prognosis and additionally agrees to Call/ Arrange follow up as recommended with Dr. Win Villalta MD in 24 - 48 hours. She also agrees with the care-plan and conveys that all of her questions have been answered. I have also put together some discharge instructions for her that include: 1) educational information regarding their diagnosis, 2) how to care for their diagnosis at home, as well a 3) list of reasons why they would want to return to the ED prior to their follow-up appointment, should their condition change or for concerns.

## 2017-08-11 NOTE — ED TRIAGE NOTES
Pt says she has Crohn's, has been having gastric issues for a month. Pt has had rectal bleeding x 3 days. Also syncope today x 2.

## 2017-08-12 ENCOUNTER — HOSPITAL ENCOUNTER (OUTPATIENT)
Age: 52
Setting detail: OBSERVATION
Discharge: HOME | DRG: 378 | End: 2017-08-17
Attending: EMERGENCY MEDICINE | Admitting: INTERNAL MEDICINE
Payer: COMMERCIAL

## 2017-08-12 ENCOUNTER — APPOINTMENT (OUTPATIENT)
Dept: GENERAL RADIOLOGY | Age: 52
DRG: 378 | End: 2017-08-12
Attending: EMERGENCY MEDICINE
Payer: COMMERCIAL

## 2017-08-12 DIAGNOSIS — I95.1 ORTHOSTATIC HYPOTENSION: ICD-10-CM

## 2017-08-12 DIAGNOSIS — K92.2 LOWER GI BLEEDING: ICD-10-CM

## 2017-08-12 DIAGNOSIS — D62 ACUTE BLOOD LOSS ANEMIA: ICD-10-CM

## 2017-08-12 DIAGNOSIS — K57.33 DIVERTICULITIS OF LARGE INTESTINE WITHOUT PERFORATION OR ABSCESS WITH BLEEDING: Primary | ICD-10-CM

## 2017-08-12 PROBLEM — K57.92 DIVERTICULITIS: Status: ACTIVE | Noted: 2017-08-12

## 2017-08-12 LAB
ALBUMIN SERPL BCP-MCNC: 2.7 G/DL (ref 3.5–5)
ALBUMIN/GLOB SERPL: 0.9 {RATIO} (ref 1.1–2.2)
ALP SERPL-CCNC: 45 U/L (ref 45–117)
ALT SERPL-CCNC: 22 U/L (ref 12–78)
ANION GAP BLD CALC-SCNC: 9 MMOL/L (ref 5–15)
AST SERPL W P-5'-P-CCNC: 9 U/L (ref 15–37)
BASOPHILS # BLD AUTO: 0 K/UL (ref 0–0.1)
BASOPHILS # BLD: 0 % (ref 0–1)
BILIRUB SERPL-MCNC: 0.5 MG/DL (ref 0.2–1)
BUN SERPL-MCNC: 9 MG/DL (ref 6–20)
BUN/CREAT SERPL: 9 (ref 12–20)
CALCIUM SERPL-MCNC: 8.3 MG/DL (ref 8.5–10.1)
CHLORIDE SERPL-SCNC: 103 MMOL/L (ref 97–108)
CO2 SERPL-SCNC: 26 MMOL/L (ref 21–32)
CREAT SERPL-MCNC: 1 MG/DL (ref 0.55–1.02)
EOSINOPHIL # BLD: 0 K/UL (ref 0–0.4)
EOSINOPHIL NFR BLD: 0 % (ref 0–7)
ERYTHROCYTE [DISTWIDTH] IN BLOOD BY AUTOMATED COUNT: 12.6 % (ref 11.5–14.5)
GLOBULIN SER CALC-MCNC: 3.1 G/DL (ref 2–4)
GLUCOSE SERPL-MCNC: 184 MG/DL (ref 65–100)
HCT VFR BLD AUTO: 30.8 % (ref 35–47)
HEMOCCULT STL QL: POSITIVE
HGB BLD-MCNC: 10.4 G/DL (ref 11.5–16)
LIPASE SERPL-CCNC: 109 U/L (ref 73–393)
LYMPHOCYTES # BLD AUTO: 8 % (ref 12–49)
LYMPHOCYTES # BLD: 1.4 K/UL (ref 0.8–3.5)
MCH RBC QN AUTO: 31.4 PG (ref 26–34)
MCHC RBC AUTO-ENTMCNC: 33.8 G/DL (ref 30–36.5)
MCV RBC AUTO: 93.1 FL (ref 80–99)
MONOCYTES # BLD: 1.1 K/UL (ref 0–1)
MONOCYTES NFR BLD AUTO: 6 % (ref 5–13)
NEUTS SEG # BLD: 15.5 K/UL (ref 1.8–8)
NEUTS SEG NFR BLD AUTO: 86 % (ref 32–75)
PLATELET # BLD AUTO: 343 K/UL (ref 150–400)
POTASSIUM SERPL-SCNC: 3.5 MMOL/L (ref 3.5–5.1)
PROT SERPL-MCNC: 5.8 G/DL (ref 6.4–8.2)
RBC # BLD AUTO: 3.31 M/UL (ref 3.8–5.2)
SODIUM SERPL-SCNC: 138 MMOL/L (ref 136–145)
WBC # BLD AUTO: 18 K/UL (ref 3.6–11)

## 2017-08-12 PROCEDURE — 74020 XR ABD FLAT/ ERECT: CPT

## 2017-08-12 PROCEDURE — 85025 COMPLETE CBC W/AUTO DIFF WBC: CPT | Performed by: EMERGENCY MEDICINE

## 2017-08-12 PROCEDURE — 74011250636 HC RX REV CODE- 250/636: Performed by: EMERGENCY MEDICINE

## 2017-08-12 PROCEDURE — 74011000250 HC RX REV CODE- 250: Performed by: EMERGENCY MEDICINE

## 2017-08-12 PROCEDURE — 85652 RBC SED RATE AUTOMATED: CPT | Performed by: INTERNAL MEDICINE

## 2017-08-12 PROCEDURE — 96361 HYDRATE IV INFUSION ADD-ON: CPT

## 2017-08-12 PROCEDURE — 80053 COMPREHEN METABOLIC PANEL: CPT | Performed by: EMERGENCY MEDICINE

## 2017-08-12 PROCEDURE — 96365 THER/PROPH/DIAG IV INF INIT: CPT

## 2017-08-12 PROCEDURE — 99284 EMERGENCY DEPT VISIT MOD MDM: CPT

## 2017-08-12 PROCEDURE — 83690 ASSAY OF LIPASE: CPT | Performed by: EMERGENCY MEDICINE

## 2017-08-12 PROCEDURE — 65270000029 HC RM PRIVATE

## 2017-08-12 PROCEDURE — 36415 COLL VENOUS BLD VENIPUNCTURE: CPT | Performed by: EMERGENCY MEDICINE

## 2017-08-12 PROCEDURE — 86900 BLOOD TYPING SEROLOGIC ABO: CPT | Performed by: EMERGENCY MEDICINE

## 2017-08-12 PROCEDURE — 82272 OCCULT BLD FECES 1-3 TESTS: CPT | Performed by: EMERGENCY MEDICINE

## 2017-08-12 PROCEDURE — 93005 ELECTROCARDIOGRAM TRACING: CPT

## 2017-08-12 RX ORDER — LEVOFLOXACIN 5 MG/ML
750 INJECTION, SOLUTION INTRAVENOUS
Status: COMPLETED | OUTPATIENT
Start: 2017-08-12 | End: 2017-08-15

## 2017-08-12 RX ORDER — SODIUM CHLORIDE 0.9 % (FLUSH) 0.9 %
5-10 SYRINGE (ML) INJECTION EVERY 8 HOURS
Status: DISCONTINUED | OUTPATIENT
Start: 2017-08-12 | End: 2017-08-17 | Stop reason: HOSPADM

## 2017-08-12 RX ORDER — SODIUM CHLORIDE 9 MG/ML
150 INJECTION, SOLUTION INTRAVENOUS CONTINUOUS
Status: DISCONTINUED | OUTPATIENT
Start: 2017-08-12 | End: 2017-08-13

## 2017-08-12 RX ORDER — SODIUM CHLORIDE 0.9 % (FLUSH) 0.9 %
5-10 SYRINGE (ML) INJECTION AS NEEDED
Status: DISCONTINUED | OUTPATIENT
Start: 2017-08-12 | End: 2017-08-17 | Stop reason: HOSPADM

## 2017-08-12 RX ORDER — METRONIDAZOLE 500 MG/100ML
500 INJECTION, SOLUTION INTRAVENOUS
Status: COMPLETED | OUTPATIENT
Start: 2017-08-12 | End: 2017-08-13

## 2017-08-12 RX ADMIN — METRONIDAZOLE 500 MG: 500 INJECTION, SOLUTION INTRAVENOUS at 23:22

## 2017-08-12 RX ADMIN — SODIUM CHLORIDE 1000 ML: 900 INJECTION, SOLUTION INTRAVENOUS at 22:34

## 2017-08-12 NOTE — IP AVS SNAPSHOT
[address] [facility] 
 
 
 [address] 
[phone] Patient: Franky Ponce MRN: [id number] :1965 You are allergic to the following Allergen Reactions Codeine Nausea and Vomiting  
    
 Sulfa (Sulfonamide Antibiotics) Other (comments) Makes face flush Immunizations Administered for This Admission Name Date  
 TB Skin Test (PPD) Intradermal  Deferred () Recent Documentation Height Weight Breastfeeding? BMI OB Status Smoking Status 1.676 m 95.3 kg No 33.89 kg/m2 Premenopausal Never Smoker Unresulted Labs Order Current Status SAMPLE TO BLOOD BANK In process THIOPURINE METHYLTRANSFERASE In process CULTURE, BODY FLUID W GRAM STAIN Preliminary result Emergency Contacts Name Discharge Info Relation Home Work Mobile [address] [facility] CAREGIVER [3] Other Relative [6] [phone] About your hospitalization You were admitted on:  2017 You last received care in the:  [facility]  MED SURG 2 You were discharged on:  2017 Unit phone number:  [phone] Why you were hospitalized Your primary diagnosis was:  Not on File Your diagnoses also included:  Diverticulitis, Brbpr (Bright Red Blood Per Rectum), Crohn's Disease Of Both Small And Large Intestine Without Complication (Hcc), Essential Hypertension Providers Seen During Your Hospitalizations Provider Role Specialty Primary office phone Nico Brooks DO Attending Provider Emergency Medicine [phone] Mathew Correia DO Attending Provider Internal Medicine [phone] Henny Ware MD Attending Provider Internal Medicine [phone] Yareli Torres MD Attending Provider Hospitalist [phone] Your Primary Care Physician (PCP) Primary Care Physician Office Phone Office Fax [address] [facility], [address] [place] [address] [phone] Follow-up Information Follow up With Details Comments Contact Info Basil Webb MD In 1 month If symptoms worsen [address] [facility] [address] 
[phone] Taylor Choudhury MD   [facility] Internal Medicine [address] 
[phone] Current Discharge Medication List  
  
CONTINUE these medications which have NOT CHANGED Dose & Instructions Dispensing Information Comments Morning Noon Evening Bedtime  
 ciprofloxacin HCl 500 mg tablet Commonly known as:  CIPRO Your last dose was: Your next dose is:    
   
   
 Dose:  500 mg Take 1 Tab by mouth two (2) times a day for 6 days. 7 day course 8/11-8/17 Quantity:  28 Tab Refills:  0  
     
   
   
   
  
 losartan 25 mg tablet Commonly known as:  COZAAR Your last dose was: Your next dose is: TAKE 1 TAB BY MOUTH DAILY FOR 90 DAYS. Refills:  1  
     
   
   
   
  
 metroNIDAZOLE 500 mg tablet Commonly known as:  FLAGYL Your last dose was: Your next dose is:    
   
   
 Dose:  500 mg Take 1 Tab by mouth three (3) times daily for 6 days. Quantity:  45 Tab Refills:  0 STOP taking these medications LIALDA 1.2 gram delayed release tablet Generic drug:  mesalamine  
   
  
 predniSONE 5 mg tablet Commonly known as:  Sharon Chill Where to Get Your Medications Information on where to get these meds will be given to you by the nurse or doctor. ! Ask your nurse or doctor about these medications  
  ciprofloxacin HCl 500 mg tablet  
 metroNIDAZOLE 500 mg tablet Discharge Instructions ACUTE DIAGNOSES: 
Diverticulitis 
anemia CHRONIC MEDICAL DIAGNOSES: 
Problem List as of 8/17/2017  Date Reviewed: 8/17/2017 Codes Class Noted - Resolved BRBPR (bright red blood per rectum) ICD-10-CM: K62.5 ICD-9-CM: 569.3  8/13/2017 - Present Diverticulitis ICD-10-CM: V38.76 
ICD-9-CM: 562.11  8/12/2017 - Present Uterine leiomyoma 2010, 2015  (Chronic) ICD-10-CM: D25.9 ICD-9-CM: 218.9  8/3/2017 - Present Crohn's colitis ([facility] fissure 2007 (Chronic) ICD-10-CM: K50.10 ICD-9-CM: 555.1  8/3/2017 - Present Essential hypertension ICD-10-CM: I10 
ICD-9-CM: 401.9  1/4/2017 - Present Crohn's disease of both small and large intestine without complication ([facility] OKR-02-QA: K50.80 ICD-9-CM: 555.2  1/4/2017 - Present DISCHARGE MEDICATIONS:  
  
 
 
· It is important that you take the medication exactly as they are prescribed. · Keep your medication in the bottles provided by the pharmacist and keep a list of the medication names, dosages, and times to be taken in your wallet. · Do not take other medications without consulting your doctor. DIET:  Regular Diet ACTIVITY: {discharge activity:17166} ADDITIONAL INFORMATION: If you experience any of the following symptoms then please call your primary care physician or return to the emergency room if you cannot get hold of your doctor: Fever, chills, nausea, vomiting, diarrhea, change in mentation, falling, bleeding, shortness of breath. FOLLOW UP CARE: 
Dr. Vinh Moore MD  you are to call and set up an appointment to see them in 2 weeks. Follow-up with Dr Nina Velez in 3 weeks Follow-up with Dr Paulina Morgan, orthopedics in 2 weeks or early if symptoms worsen. Information obtained by : 
I understand that if any problems occur once I am at home I am to contact my physician. I understand and acknowledge receipt of the instructions indicated above. Physician's or R.N.'s Signature                                                                  Date/Time Patient or Representative Signature                                                          Date/Time Discharge Orders None [email or web address] Announcement We are excited to announce that we are making your provider's discharge notes available to you in MySQL. You will see these notes when they are completed and signed by the physician that discharged you from your recent hospital stay. If you have any questions or concerns about any information you see in MySQL, please call the Health Information Department where you were seen or reach out to your Primary Care Provider for more information about your plan of care. Introducing [facility]! Dear Lashawn Thurman: Thank you for requesting a MySQL account. Our records indicate that you already have an active MySQL account. You can access your account anytime at [email or web address] Did you know that you can access your hospital and ER discharge instructions at any time in MySQL? You can also review all of your test results from your hospital stay or ER visit. Additional Information If you have questions, please visit the Frequently Asked Questions section of the MySQL website at [email or web address] Remember, MySQL is NOT to be used for urgent needs. For medical emergencies, dial 911. Now available from your iPhone and Android! General Information Please provide this summary of care documentation to your next provider. Patient Signature:  ____________________________________________________________ Date:  ____________________________________________________________  
  
Vicente Police Provider Signature:  ____________________________________________________________ Date:  ____________________________________________________________

## 2017-08-12 NOTE — IP AVS SNAPSHOT
[facility] [address] [facility] 
[phone] Patient: Maximus Tyson MRN: [id number] :1965 Current Discharge Medication List  
  
CONTINUE these medications which have NOT CHANGED Dose & Instructions Dispensing Information Comments Morning Noon Evening Bedtime  
 ciprofloxacin HCl 500 mg tablet Commonly known as:  CIPRO Your last dose was: Your next dose is:    
   
   
 Dose:  500 mg Take 1 Tab by mouth two (2) times a day for 6 days. 7 day course - Quantity:  28 Tab Refills:  0  
     
   
   
   
  
 losartan 25 mg tablet Commonly known as:  COZAAR Your last dose was: Your next dose is: TAKE 1 TAB BY MOUTH DAILY FOR 90 DAYS. Refills:  1  
     
   
   
   
  
 metroNIDAZOLE 500 mg tablet Commonly known as:  FLAGYL Your last dose was: Your next dose is:    
   
   
 Dose:  500 mg Take 1 Tab by mouth three (3) times daily for 6 days. Quantity:  45 Tab Refills:  0 STOP taking these medications LIALDA 1.2 gram delayed release tablet Generic drug:  mesalamine  
   
  
 predniSONE 5 mg tablet Commonly known as:  Kaylah Beam Where to Get Your Medications Information on where to get these meds will be given to you by the nurse or doctor. ! Ask your nurse or doctor about these medications  
  ciprofloxacin HCl 500 mg tablet  
 metroNIDAZOLE 500 mg tablet

## 2017-08-13 PROBLEM — K62.5 BRBPR (BRIGHT RED BLOOD PER RECTUM): Status: ACTIVE | Noted: 2017-08-13

## 2017-08-13 LAB
ABO + RH BLD: NORMAL
APPEARANCE UR: ABNORMAL
BACTERIA URNS QL MICRO: NEGATIVE /HPF
BILIRUB UR QL: NEGATIVE
BLOOD GROUP ANTIBODIES SERPL: NORMAL
COLOR UR: ABNORMAL
EPITH CASTS URNS QL MICRO: ABNORMAL /LPF
ERYTHROCYTE [SEDIMENTATION RATE] IN BLOOD: 43 MM/HR (ref 0–30)
GLUCOSE UR STRIP.AUTO-MCNC: NEGATIVE MG/DL
HCT VFR BLD AUTO: 25.2 % (ref 35–47)
HCT VFR BLD AUTO: 26 % (ref 35–47)
HCT VFR BLD AUTO: 27.5 % (ref 35–47)
HGB BLD-MCNC: 8.5 G/DL (ref 11.5–16)
HGB BLD-MCNC: 8.5 G/DL (ref 11.5–16)
HGB BLD-MCNC: 8.9 G/DL (ref 11.5–16)
HGB UR QL STRIP: ABNORMAL
KETONES UR QL STRIP.AUTO: ABNORMAL MG/DL
LEUKOCYTE ESTERASE UR QL STRIP.AUTO: ABNORMAL
NITRITE UR QL STRIP.AUTO: NEGATIVE
PH UR STRIP: 6 [PH] (ref 5–8)
PROT UR STRIP-MCNC: ABNORMAL MG/DL
RBC #/AREA URNS HPF: ABNORMAL /HPF (ref 0–5)
SP GR UR REFRACTOMETRY: 1.02 (ref 1–1.03)
SPECIMEN EXP DATE BLD: NORMAL
UROBILINOGEN UR QL STRIP.AUTO: 0.2 EU/DL (ref 0.2–1)
WBC URNS QL MICRO: ABNORMAL /HPF (ref 0–4)

## 2017-08-13 PROCEDURE — 36415 COLL VENOUS BLD VENIPUNCTURE: CPT | Performed by: INTERNAL MEDICINE

## 2017-08-13 PROCEDURE — 65270000029 HC RM PRIVATE

## 2017-08-13 PROCEDURE — 99218 HC RM OBSERVATION: CPT

## 2017-08-13 PROCEDURE — 74011250636 HC RX REV CODE- 250/636: Performed by: INTERNAL MEDICINE

## 2017-08-13 PROCEDURE — 89055 LEUKOCYTE ASSESSMENT FECAL: CPT | Performed by: INTERNAL MEDICINE

## 2017-08-13 PROCEDURE — 96366 THER/PROPH/DIAG IV INF ADDON: CPT

## 2017-08-13 PROCEDURE — 74011250637 HC RX REV CODE- 250/637: Performed by: INTERNAL MEDICINE

## 2017-08-13 PROCEDURE — 87427 SHIGA-LIKE TOXIN AG IA: CPT | Performed by: INTERNAL MEDICINE

## 2017-08-13 PROCEDURE — 74011250636 HC RX REV CODE- 250/636: Performed by: EMERGENCY MEDICINE

## 2017-08-13 PROCEDURE — 81001 URINALYSIS AUTO W/SCOPE: CPT | Performed by: EMERGENCY MEDICINE

## 2017-08-13 PROCEDURE — 74011000250 HC RX REV CODE- 250: Performed by: INTERNAL MEDICINE

## 2017-08-13 PROCEDURE — 80074 ACUTE HEPATITIS PANEL: CPT | Performed by: INTERNAL MEDICINE

## 2017-08-13 PROCEDURE — 96375 TX/PRO/DX INJ NEW DRUG ADDON: CPT

## 2017-08-13 PROCEDURE — 85018 HEMOGLOBIN: CPT | Performed by: INTERNAL MEDICINE

## 2017-08-13 PROCEDURE — 96361 HYDRATE IV INFUSION ADD-ON: CPT

## 2017-08-13 PROCEDURE — 87493 C DIFF AMPLIFIED PROBE: CPT | Performed by: INTERNAL MEDICINE

## 2017-08-13 PROCEDURE — 96367 TX/PROPH/DG ADDL SEQ IV INF: CPT

## 2017-08-13 RX ORDER — POTASSIUM CHLORIDE AND SODIUM CHLORIDE 900; 300 MG/100ML; MG/100ML
INJECTION, SOLUTION INTRAVENOUS CONTINUOUS
Status: DISCONTINUED | OUTPATIENT
Start: 2017-08-13 | End: 2017-08-15

## 2017-08-13 RX ORDER — MORPHINE SULFATE 4 MG/ML
2 INJECTION, SOLUTION INTRAMUSCULAR; INTRAVENOUS
Status: DISCONTINUED | OUTPATIENT
Start: 2017-08-13 | End: 2017-08-17 | Stop reason: HOSPADM

## 2017-08-13 RX ORDER — METRONIDAZOLE 500 MG/100ML
500 INJECTION, SOLUTION INTRAVENOUS EVERY 6 HOURS
Status: DISCONTINUED | OUTPATIENT
Start: 2017-08-13 | End: 2017-08-13

## 2017-08-13 RX ORDER — SODIUM CHLORIDE 0.9 % (FLUSH) 0.9 %
5-10 SYRINGE (ML) INJECTION EVERY 8 HOURS
Status: DISCONTINUED | OUTPATIENT
Start: 2017-08-13 | End: 2017-08-17 | Stop reason: HOSPADM

## 2017-08-13 RX ORDER — METRONIDAZOLE 500 MG/100ML
500 INJECTION, SOLUTION INTRAVENOUS EVERY 8 HOURS
Status: DISCONTINUED | OUTPATIENT
Start: 2017-08-13 | End: 2017-08-17 | Stop reason: HOSPADM

## 2017-08-13 RX ORDER — PREDNISONE 5 MG/1
25 TABLET ORAL DAILY
COMMUNITY
End: 2017-08-17

## 2017-08-13 RX ORDER — MESALAMINE 1.2 G/1
4.8 TABLET, DELAYED RELEASE ORAL DAILY
COMMUNITY
End: 2017-08-17

## 2017-08-13 RX ORDER — SODIUM CHLORIDE 0.9 % (FLUSH) 0.9 %
5-10 SYRINGE (ML) INJECTION AS NEEDED
Status: DISCONTINUED | OUTPATIENT
Start: 2017-08-13 | End: 2017-08-17 | Stop reason: HOSPADM

## 2017-08-13 RX ORDER — ACETAMINOPHEN 325 MG/1
650 TABLET ORAL
Status: DISCONTINUED | OUTPATIENT
Start: 2017-08-13 | End: 2017-08-17 | Stop reason: HOSPADM

## 2017-08-13 RX ORDER — CIPROFLOXACIN 500 MG/1
500 TABLET ORAL 2 TIMES DAILY
Status: ON HOLD | COMMUNITY
Start: 2017-08-11 | End: 2017-08-17

## 2017-08-13 RX ORDER — METRONIDAZOLE 500 MG/1
500 TABLET ORAL 3 TIMES DAILY
Status: ON HOLD | COMMUNITY
Start: 2017-08-11 | End: 2017-08-17

## 2017-08-13 RX ORDER — ONDANSETRON 2 MG/ML
4 INJECTION INTRAMUSCULAR; INTRAVENOUS
Status: DISCONTINUED | OUTPATIENT
Start: 2017-08-13 | End: 2017-08-17 | Stop reason: HOSPADM

## 2017-08-13 RX ORDER — MAGNESIUM CITRATE
296 SOLUTION, ORAL ORAL 2 TIMES DAILY
Status: COMPLETED | OUTPATIENT
Start: 2017-08-13 | End: 2017-08-13

## 2017-08-13 RX ORDER — DIPHENHYDRAMINE HYDROCHLORIDE 50 MG/ML
12.5 INJECTION, SOLUTION INTRAMUSCULAR; INTRAVENOUS
Status: DISCONTINUED | OUTPATIENT
Start: 2017-08-13 | End: 2017-08-17 | Stop reason: HOSPADM

## 2017-08-13 RX ORDER — LEVOFLOXACIN 5 MG/ML
750 INJECTION, SOLUTION INTRAVENOUS EVERY 24 HOURS
Status: DISCONTINUED | OUTPATIENT
Start: 2017-08-14 | End: 2017-08-17 | Stop reason: HOSPADM

## 2017-08-13 RX ADMIN — MAGESIUM CITRATE 296 ML: 1.75 LIQUID ORAL at 10:02

## 2017-08-13 RX ADMIN — Medication 10 ML: at 14:22

## 2017-08-13 RX ADMIN — POLYETHYLENE GLYCOL-3350 AND ELECTROLYTES 4000 ML: 236; 6.74; 5.86; 2.97; 22.74 POWDER, FOR SOLUTION ORAL at 10:02

## 2017-08-13 RX ADMIN — METRONIDAZOLE 500 MG: 500 INJECTION, SOLUTION INTRAVENOUS at 21:22

## 2017-08-13 RX ADMIN — ACETAMINOPHEN 650 MG: 325 TABLET ORAL at 20:45

## 2017-08-13 RX ADMIN — LEVOFLOXACIN 750 MG: 5 INJECTION, SOLUTION INTRAVENOUS at 02:32

## 2017-08-13 RX ADMIN — METRONIDAZOLE 500 MG: 500 INJECTION, SOLUTION INTRAVENOUS at 06:49

## 2017-08-13 RX ADMIN — METRONIDAZOLE 500 MG: 500 INJECTION, SOLUTION INTRAVENOUS at 14:22

## 2017-08-13 RX ADMIN — SODIUM CHLORIDE AND POTASSIUM CHLORIDE: 9; 2.98 INJECTION, SOLUTION INTRAVENOUS at 23:03

## 2017-08-13 RX ADMIN — Medication 10 ML: at 01:59

## 2017-08-13 RX ADMIN — MAGESIUM CITRATE 296 ML: 1.75 LIQUID ORAL at 17:43

## 2017-08-13 RX ADMIN — SODIUM CHLORIDE AND POTASSIUM CHLORIDE: 9; 2.98 INJECTION, SOLUTION INTRAVENOUS at 10:57

## 2017-08-13 RX ADMIN — SODIUM CHLORIDE 150 ML/HR: 900 INJECTION, SOLUTION INTRAVENOUS at 01:58

## 2017-08-13 RX ADMIN — METHYLPREDNISOLONE SODIUM SUCCINATE 40 MG: 40 INJECTION, POWDER, FOR SOLUTION INTRAMUSCULAR; INTRAVENOUS at 02:05

## 2017-08-13 NOTE — PROGRESS NOTES
TRANSFER - IN REPORT:    Verbal report received from Trevor(name) on Zaina Vasquez  being received from ER(unit) for routine progression of care      Report consisted of patients Situation, Background, Assessment and   Recommendations(SBAR). Information from the following report(s) SBAR, Kardex, Intake/Output, MAR and Recent Results was reviewed with the receiving nurse. Opportunity for questions and clarification was provided. Assessment completed upon patients arrival to unit and care assumed.

## 2017-08-13 NOTE — PROGRESS NOTES
[facility]: MED RECONCILIATION    Comments/Recommendations:      Patient was alert, oriented, and knowledgeable of her medications   Patients 5 mg prednisone is a taper and she is currently on 5 tablets a day    Medications adjusted:    · Lialda 1.2 g 2 tablets daily was changed to 4 tablets daily    Information obtained from: Patient, Rx Query    Allergies: Codeine and Sulfa (sulfonamide antibiotics)    Prior to Admission Medications:   Prior to Admission Medications   Prescriptions Last Dose Informant Patient Reported? Taking?   ciprofloxacin HCl (CIPRO) 500 mg tablet 8/12/2017 at am Self Yes Yes   Sig: Take 500 mg by mouth two (2) times a day. 7 day course 8/11-8/17   losartan (COZAAR) 25 mg tablet 8/12/2017 at am Self Yes Yes   Sig: TAKE 1 TAB BY MOUTH DAILY FOR 90 DAYS. mesalamine (LIALDA) 1.2 gram delayed release tablet 8/12/2017 at am Self Yes Yes   Sig: Take 4.8 g by mouth daily. metroNIDAZOLE (FLAGYL) 500 mg tablet 8/12/2017 at pm Self Yes Yes   Sig: Take 500 mg by mouth three (3) times daily.    predniSONE (DELTASONE) 5 mg tablet 8/12/2017 at am Self Yes Yes   Sig: Take 25 mg by mouth daily        Thank you,    Yo Saldivar [phone]: [phone]

## 2017-08-13 NOTE — PROGRESS NOTES
Dr. Evita Roldan notified that patient just had lab work done to rule out TB at lab gypsy, within the last 2 weeks, and patient does not want to do the PPD. MD stated that was fine as long as we have copies or proof of results. Patient updated, and she said we could request those results here at [facility] when lab gypsy opens tomorrow, and she will sign necessary paper work. Will pass along to oncoming shift.

## 2017-08-13 NOTE — ROUTINE PROCESS
TRANSFER - OUT REPORT:    Verbal report given to LAMAR Hernadez(name[facility] to 5th floor(unit) for routine progression of care       Report consisted of patients Situation, Background, Assessment and   Recommendations(SBAR). Information from the following report(s) SBAR, ED Summary, [facility] and Recent Results was reviewed with the receiving nurse. Lines:   Peripheral IV 08/12/17 Right Antecubital (Active)   Site Assessment Clean, dry, & intact 8/12/2017 10:31 PM   Phlebitis Assessment 0 8/12/2017 10:31 PM   Infiltration Assessment 0 8/12/2017 10:31 PM   Dressing Status Clean, dry, & intact 8/12/2017 10:31 PM   Dressing Type Transparent 8/12/2017 10:31 PM   Hub Color/Line Status Pink 8/12/2017 10:31 PM        Opportunity for questions and clarification was provided.       Patient transported with:   [employer]

## 2017-08-13 NOTE — ED NOTES
Orthostatics obtained, upon standing patient became pale and slumped down on stretcher, regained consciousness within 30 seconds or less and was able to answer questions. MD mcfarland.

## 2017-08-13 NOTE — ED TRIAGE NOTES
Patient presents for continued rectal bleeding; was here last night for the same. History of crohn's. Patient reports several episodes of BRBPR today accompanied by dizziness. Patient pale upon arrival.  Also reports vaginal bleeding.

## 2017-08-13 NOTE — ED PROVIDER NOTES
HPI Comments: 50 y.o. female with past medical history significant for HTN, Crohn's disease, Uterine fibroid who presents from home with chief complaint of blood in stool. Pt reports hx of crohn's disease. She was on remicade from 0365-8271. She has been symptom up until a couple weeks ago. Pt notes that she has had low left abdominal pain accompanied bloody diarrhea. Pt notes being evaluated in ED 1 night ago for symptoms at which tiem she was diagnosed with diverticulitis. Pt notes today that the bloody stool has increased. She has had bloody stools \"4-5x within last 3 hours\". Pt also c/o dizziness with a feeling of near syncope. She notes dysuria today but is unsure whether hematuria is present. No syncopal episode today. Pt denies any other acute medical concerns at this time. Chart review: Pt evaluated in ED yesterday for diverticulitis without perforation. Occult stool - heme negative. CT abd/pelv - diverticulitis. Pt discharged home on cipro and flagyl. PCP: Elian Suarez MD  GI: Jose R Rivero MD (Colonoscopy scheduled for this coming Tuesday)    Note written by Lila Snell, as dictated by Sydnee Torres DO 10:22 PM    The history is provided by the patient. No  was used. Past Medical History:   Diagnosis Date    Crohn's colitis ([address]     Hypertension        Past Surgical History:   Procedure Laterality Date    HX APPENDECTOMY      1993    HX CHOLECYSTECTOMY N/A     HX TONSIL AND ADENOIDECTOMY Bilateral          History reviewed. No pertinent family history. Social History     Social History    Marital status: SINGLE     Spouse name: N/A    Number of children: N/A    Years of education: N/A     Occupational History    Not on file.      Social History Main Topics    Smoking status: Former Smoker    Smokeless tobacco: Never Used    Alcohol use Yes    Drug use: No    Sexual activity: No     Other Topics Concern    Not on file Social History Narrative     ALLERGIES: Codeine and Sulfa (sulfonamide antibiotics)    Review of Systems   Constitutional: Negative for appetite change, chills, fever and unexpected weight change. HENT: Negative for ear pain, hearing loss, rhinorrhea and trouble swallowing. Eyes: Negative for pain and visual disturbance. Respiratory: Negative for cough, chest tightness and shortness of breath. Cardiovascular: Negative for chest pain and palpitations. Gastrointestinal: Positive for abdominal pain (LLQ), blood in stool and diarrhea. Negative for abdominal distention, nausea and vomiting. Genitourinary: Positive for dysuria. Negative for hematuria and urgency. Musculoskeletal: Negative for back pain and myalgias. Skin: Negative for rash. Neurological: Positive for dizziness. Negative for syncope, weakness and numbness. Psychiatric/Behavioral: Negative for confusion and suicidal ideas. All other systems reviewed and are negative. Vitals:    08/12/17 2209   BP: 94/58   Pulse: 85   Resp: 18   Temp: 98.4 °F (36.9 °C)   SpO2: 98%   Weight: 95.3 kg (210 lb)   Height: 5' 6\" (1.676 m)            Physical Exam   Constitutional: She is oriented to person, place, and time. She appears well-developed and well-nourished. No distress. HENT:   Head: Normocephalic and atraumatic. Right Ear: External ear normal.   Left Ear: External ear normal.   Nose: Nose normal.   Mouth/Throat: Oropharynx is clear and moist. No oropharyngeal exudate. Eyes: Conjunctivae and EOM are normal. Pupils are equal, round, and reactive to light. Right eye exhibits no discharge. Left eye exhibits no discharge. No scleral icterus. Neck: Normal range of motion. Neck supple. No JVD present. No tracheal deviation present. Cardiovascular: Normal rate, regular rhythm, normal heart sounds and intact distal pulses. Exam reveals no gallop and no friction rub. No murmur heard.   Pulmonary/Chest: Effort normal and breath sounds normal. No stridor. No respiratory distress. She has no decreased breath sounds. She has no wheezes. She has no rhonchi. She has no rales. She exhibits no tenderness. Abdominal: Soft. Bowel sounds are normal. She exhibits no distension. There is tenderness in the left lower quadrant. There is no rebound and no guarding. Genitourinary:   Genitourinary Comments: + rectal bleeding   Musculoskeletal: Normal range of motion. She exhibits no edema or tenderness. Neurological: She is alert and oriented to person, place, and time. She has normal strength and normal reflexes. No cranial nerve deficit or sensory deficit. She exhibits normal muscle tone. Coordination normal. GCS eye subscore is 4. GCS verbal subscore is 5. GCS motor subscore is 6. Skin: Skin is warm and dry. No rash noted. She is not diaphoretic. No erythema. There is pallor. Psychiatric: She has a normal mood and affect. Her behavior is normal. Judgment and thought content normal.   Nursing note and vitals reviewed.    Note written by Lila Zhou, as dictated by Angelito Bro DO 10:26 PM    MDM  Number of Diagnoses or Management Options  Acute blood loss anemia:   Diverticulitis of large intestine without perforation or abscess with bleeding:   Lower GI bleeding:   Orthostatic hypotension:      Amount and/or Complexity of Data Reviewed  Clinical lab tests: ordered and reviewed  Tests in the radiology section of CPT®: ordered and reviewed  Tests in the medicine section of CPT®: ordered and reviewed  Review and summarize past medical records: yes  Discuss the patient with other providers: yes (Hospitalist)  Independent visualization of images, tracings, or specimens: yes (ekg)    Risk of Complications, Morbidity, and/or Mortality  Presenting problems: moderate  Diagnostic procedures: moderate  Management options: moderate    Critical Care  Total time providing critical care: 30-74 minutes (40 minutes of CCT regardless of any procedures that might have been done.)    Patient Progress  Patient progress: stable    ED Course     CONSULT NOTE:  11:51 PM Zoraida Espino DO spoke with Dr. Tiffanie Barakat, Consult for Hospitalist.  Discussed available diagnostic tests and clinical findings. Dr. Tiffanie Barakat will see and admit patient. Procedures   Chief Complaint   Patient presents with    Rectal Bleeding    Dizziness       12:12 AM  The patients presenting problems have been discussed, and they are in agreement with the care plan formulated and outlined with them. I have encouraged them to ask questions as they arise throughout their visit. MEDICATIONS GIVEN:  Medications   sodium chloride (NS) flush 5-10 mL (not administered)   sodium chloride (NS) flush 5-10 mL (not administered)   levoFLOXacin (LEVAQUIN) 750 mg in D5W IVPB (not administered)   metroNIDAZOLE (FLAGYL) IVPB premix 500 mg (500 mg IntraVENous New Bag 8/12/17 4522)   0.9% sodium chloride infusion (not administered)   sodium chloride 0.9 % bolus infusion 1,000 mL (1,000 mL IntraVENous New Bag 8/12/17 2234)   sodium chloride 0.9 % bolus infusion 1,000 mL (1,000 mL IntraVENous New Bag 8/12/17 2234)       LABS REVIEWED:  Recent Results (from the past 24 hour(s))   CBC WITH AUTOMATED DIFF    Collection Time: 08/12/17 10:25 PM   Result Value Ref Range    WBC 18.0 (H) 3.6 - 11.0 K/uL    RBC 3.31 (L) 3.80 - 5.20 M/uL    HGB 10.4 (L) 11.5 - 16.0 g/dL    HCT 30.8 (L) 35.0 - 47.0 %    MCV 93.1 80.0 - 99.0 FL    MCH 31.4 26.0 - 34.0 PG    MCHC 33.8 30.0 - 36.5 g/dL    RDW 12.6 11.5 - 14.5 %    PLATELET 878 833 - 708 K/uL    NEUTROPHILS 86 (H) 32 - 75 %    LYMPHOCYTES 8 (L) 12 - 49 %    MONOCYTES 6 5 - 13 %    EOSINOPHILS 0 0 - 7 %    BASOPHILS 0 0 - 1 %    ABS. NEUTROPHILS 15.5 (H) 1.8 - 8.0 K/UL    ABS. LYMPHOCYTES 1.4 0.8 - 3.5 K/UL    ABS. MONOCYTES 1.1 (H) 0.0 - 1.0 K/UL    ABS. EOSINOPHILS 0.0 0.0 - 0.4 K/UL    ABS.  BASOPHILS 0.0 0.0 - 0.1 K/UL   METABOLIC PANEL, COMPREHENSIVE Collection Time: 08/12/17 10:25 PM   Result Value Ref Range    Sodium 138 136 - 145 mmol/L    Potassium 3.5 3.5 - 5.1 mmol/L    Chloride 103 97 - 108 mmol/L    CO2 26 21 - 32 mmol/L    Anion gap 9 5 - 15 mmol/L    Glucose 184 (H) 65 - 100 mg/dL    BUN 9 6 - 20 MG/DL    Creatinine 1.00 0.55 - 1.02 MG/DL    BUN/Creatinine ratio 9 (L) 12 - 20      GFR est AA >60 >60 ml/min/1.73m2    GFR est non-AA 59 (L) >60 ml/min/1.73m2    Calcium 8.3 (L) 8.5 - 10.1 MG/DL    Bilirubin, total 0.5 0.2 - 1.0 MG/DL    ALT (SGPT) 22 12 - 78 U/L    AST (SGOT) 9 (L) 15 - 37 U/L    Alk. phosphatase 45 45 - 117 U/L    Protein, total 5.8 (L) 6.4 - 8.2 g/dL    Albumin 2.7 (L) 3.5 - 5.0 g/dL    Globulin 3.1 2.0 - 4.0 g/dL    A-G Ratio 0.9 (L) 1.1 - 2.2     LIPASE    Collection Time: 08/12/17 10:25 PM   Result Value Ref Range    Lipase 109 73 - 393 U/L   OCCULT BLOOD, STOOL    Collection Time: 08/12/17 10:25 PM   Result Value Ref Range    Occult blood, stool POSITIVE (A) NEG     EKG, 12 LEAD, INITIAL    Collection Time: 08/12/17 10:32 PM   Result Value Ref Range    Ventricular Rate 58 BPM    Atrial Rate 58 BPM    P-R Interval 112 ms    QRS Duration 82 ms    Q-T Interval 426 ms    QTC Calculation (Bezet) 418 ms    Calculated P Axis 2 degrees    Calculated R Axis 8 degrees    Calculated T Axis 36 degrees    Diagnosis       Sinus bradycardia  Otherwise normal ECG  No previous ECGs available         VITAL SIGNS:  Patient Vitals for the past 12 hrs:   Temp Pulse Resp BP SpO2   08/12/17 2330 - 70 22 100/53 100 %   08/12/17 2300 - 65 22 97/51 100 %   08/12/17 2230 - (!) 51 19 93/52 100 %   08/12/17 2209 98.4 °F (36.9 °C) 85 18 94/58 98 %       RADIOLOGY RESULTS:  The following have been ordered and reviewed:  XR ABD FLAT/ ERECT   Final Result        Study Result      INDICATION: Abdominal pain     FINDINGS: Lateral decubitus and supine views of the abdomen demonstrate a  nonobstructive bowel gas pattern.  Surgical clips are present in the right upper  quadrant of the abdomen. There is no intraperitoneal free air. No soft tissue  mass or pathological soft tissue calcification is seen. The osseous structures  are unremarkable.     IMPRESSION  IMPRESSION: No evidence of acute abdominal process. ED EKG interpretation:  Rhythm: sinus bradycardia; and regular . Rate (approx.): 58; Axis: normal; P wave: normal; QRS interval: normal ; ST/T wave: normal; Other findings: normal. This EKG was interpreted by Ally Martin DO,ED Provider. CONSULTATIONS:   Hospitalist    PROGRESS NOTES:  Discussed results and plan with patient. Patient will be admitted/observed for further evaluation and treatment. DIAGNOSIS:    1. Diverticulitis of large intestine without perforation or abscess with bleeding    2. Acute blood loss anemia    3. Orthostatic hypotension    4. Lower GI bleeding        PLAN:  Admit/obs    ED COURSE: The patients hospital course has been uncomplicated.

## 2017-08-13 NOTE — ED NOTES
Patient better after syncopal episode. Awake and alert. Patient to x-ray, advised not to sit up quickly or stand.

## 2017-08-13 NOTE — PROGRESS NOTES
Bedside and Verbal shift change report given to Herbert Rodriguez (oncoming nurse) by Shreya Savage (offgoing nurse). Report included the following information SBAR, Kardex, Intake/Output, MAR and Recent Results.

## 2017-08-13 NOTE — CONSULTS
Gastroenterology Consult     Referring Physician: Kris Felder    Consult Date: 8/13/2017     Subjective:Bleeding     Chief Complaint: rectal bleeding    History of Present Illness: Zaina Vasquez is a 50 y.o. female who is seen in consultation for rectal bleeding with blood loss anemia. Personal history Crohn's disease with all therapy given while in [place] and subsequently [place]. Had been using in past mesalamine, corticosteroids, Remicade. Was well and discontinued medications over a year ago. About a month ago began with lower abdominal pains and then progressively severe rectal bleeding. A couple weeks ago began 25 mg daily prednisone, mesalamine. Symptoms unchanged; comes to ER for progressive symptoms. I have discussed medications useable Crohn's disease, potential short and long term side effects; all questions answered. She understands it critical to ascertain definitive diagnosis and very potent immunosuppression in setting of acute infection could make condition much worse. Past Medical History:   Diagnosis Date    Crohn's colitis ([facility] [address]     Hypertension      Past Surgical History:   Procedure Laterality Date    HX APPENDECTOMY      1993    HX CHOLECYSTECTOMY N/A     HX TONSIL AND ADENOIDECTOMY Bilateral       History reviewed. No pertinent family history.   Social History   Substance Use Topics    Smoking status: Former Smoker    Smokeless tobacco: Never Used    Alcohol use Yes      Allergies   Allergen Reactions    Codeine Nausea and Vomiting    Sulfa (Sulfonamide Antibiotics) Other (comments)     Makes face flush     Current Facility-Administered Medications   Medication Dose Route Frequency    sodium chloride (NS) flush 5-10 mL  5-10 mL IntraVENous Q8H    sodium chloride (NS) flush 5-10 mL  5-10 mL IntraVENous PRN    metroNIDAZOLE (FLAGYL) IVPB premix 500 mg  500 mg IntraVENous Q6H    [START ON 8/14/2017] levoFLOXacin (LEVAQUIN) 750 mg in D5W IVPB  750 mg IntraVENous Q24H    ondansetron (ZOFRAN) injection 4 mg  4 mg IntraVENous Q4H PRN    diphenhydrAMINE (BENADRYL) injection 12.5 mg  12.5 mg IntraVENous Q4H PRN    acetaminophen (TYLENOL) tablet 650 mg  650 mg Oral Q4H PRN    morphine injection 2 mg  2 mg IntraVENous Q4H PRN    magnesium citrate solution 296 mL  296 mL Oral BID    sodium chloride (NS) flush 5-10 mL  5-10 mL IntraVENous Q8H    sodium chloride (NS) flush 5-10 mL  5-10 mL IntraVENous PRN    0.9% sodium chloride infusion  150 mL/hr IntraVENous CONTINUOUS        Review of Systems:  A detailed 10 organ review of systems is obtained with pertinent positives as listed in the History of Present Illness and Past Medical History. All others are negative. Objective:     Physical Exam:  Visit Vitals    BP 97/53 (BP 1 Location: Left arm, BP Patient Position: At rest)    Pulse 77    Temp 98.8 °F (37.1 °C)    Resp 20    Ht 5' 6\" (1.676 m)    Wt 95.3 kg (210 lb)    LMP 08/12/2017    SpO2 98%    Breastfeeding No    BMI 33.89 kg/m2        Skin:  Extremities and face reveal no rashes. No may erythema. No telangiectasias on the chest wall. HEENT: Sclerae anicteric. Extra-occular muscles are intact. No oral ulcers. No abnormal pigmentation of the lips. The neck is supple. Cardiovascular: Regular rate and rhythm. No murmurs, gallops, or rubs. PMI nondisplaced. Carotids without bruits. Respiratory:  Comfortable breathing with no accessory muscle use. Clear breath sounds with no wheezes, rales, or rhonchi. GI:  Abdomen nondistended, soft, and nontender. Normal active bowel sounds. No enlargement of the liver or spleen. No masses palpable. Musculoskeletal:  No pitting edema of the lower legs. Extremities have good range of motion. No costovertebral tenderness. Neurological:  Gross memory appears intact. Patient is alert and oriented. Psychiatric:  Mood appears appropriate with judgement intact.   Lymphatic:  No cervical or supraclavicular adenopathy. Lab/Data Review:  CMP:   Lab Results   Component Value Date/Time     08/12/2017 10:25 PM    K 3.5 08/12/2017 10:25 PM     08/12/2017 10:25 PM    CO2 26 08/12/2017 10:25 PM    AGAP 9 08/12/2017 10:25 PM     (H) 08/12/2017 10:25 PM    BUN 9 08/12/2017 10:25 PM    CREA 1.00 08/12/2017 10:25 PM    GFRAA >60 08/12/2017 10:25 PM    GFRNA 59 (L) 08/12/2017 10:25 PM    CA 8.3 (L) 08/12/2017 10:25 PM    ALB 2.7 (L) 08/12/2017 10:25 PM    TP 5.8 (L) 08/12/2017 10:25 PM    GLOB 3.1 08/12/2017 10:25 PM    AGRAT 0.9 (L) 08/12/2017 10:25 PM    SGOT 9 (L) 08/12/2017 10:25 PM    ALT 22 08/12/2017 10:25 PM     CBC:   Lab Results   Component Value Date/Time    WBC 18.0 (H) 08/12/2017 10:25 PM    HGB 8.5 (L) 08/13/2017 07:03 AM    HCT 26.0 (L) 08/13/2017 07:03 AM     08/12/2017 10:25 PM     CT scan: The visualized portions of the lung bases are clear.     The precontrast images demonstrate no abnormal calcifications. .     Following contrast administration, liver is unremarkable. There is no biliary  dilatation. Previous cholecystectomy noted. The spleen is normal. Pancreas and  adrenal glands are unremarkable. Kidneys demonstrate no mass or hydronephrosis.     The aorta tapers without aneurysm. There is no retroperitoneal adenopathy or  mass. There is no free intraperitoneal air. Trace of ascites noted in the  pelvis. .     Bowel demonstrates no evidence of GI blood loss. There is a focal area of edema  at the junction of the descending colon and sigmoid colon. Diverticuli noted in  this region. Thickening of the bowel wall is also noted. This is most consistent  with diverticulitis which is localized in this region. Note is made of patient's  history of Crohn's disease. The possibility of a localized area of Crohn's  disease cannot be totally excluded, however is thought to be far less likely. The terminal ileum is unremarkable.  The appendix is absent.      The study of the pelvis demonstrates moderately full urinary bladder without  calcification. .   There is no pelvic mass or adenopathy      Assessment/Plan:     Active Problems:    Essential hypertension (1/4/2017)      Crohn's disease of both small and large intestine without complication ([facility] [address] (6/3/8389)      Diverticulitis (8/12/2017)      BRBPR (bright red blood per rectum) (8/13/2017)     GI bleed; CT implies diagnosis other than regional enteritis    Blood loss anemia  Recommend:    TPMT  PPD  I've discussed colonoscopy possible biopsy, polypectomy, cautery, injection, alternatives, complications including but not limited to pain, cardiopulmonary event, bleeding, perforation requiring additional blood transfusion or operative repair; all questions answered.     Thanks

## 2017-08-13 NOTE — H&P
[address]  [phone]    Hospitalist Admission Note      NAME:  Rose Zaragoza   :   1965   MRN:  [id number]     PCP:  Lotus Mckenna MD     Date/Time:  2017 12:28 AM          Subjective:     CHIEF COMPLAINT: BRBPR, abd pain     HISTORY OF PRESENT ILLNESS:     Ms. Spike Wu is a 50 y.o.  female with a hx of uterine fibroids, crohn's disease, HTN who presented to the Emergency Department complaining of 2 weeks of progressive abdominal pain/cramping in lower quadrants followed by onset of increasing number of bloody bowel movements over the last 2 days followed by dizziness and near syncope. Patient was on remicade for a long period of time and has been off since  and sx free on Lialda until 1 month ago when seen by GI and started on prednisone taper with plan for C-scope next week. Seen in ED 1 day ago and given cipro/metro but sx persistent and so was bleeding. We will admit for further management. Past Medical History:   Diagnosis Date    Crohn's colitis ([address]     Hypertension         Past Surgical History:   Procedure Laterality Date    HX APPENDECTOMY          HX CHOLECYSTECTOMY N/A     HX TONSIL AND ADENOIDECTOMY Bilateral        Social History   Substance Use Topics    Smoking status: Former Smoker    Smokeless tobacco: Never Used    Alcohol use Yes        No family hx of GI malignancy     Allergies   Allergen Reactions    Codeine Nausea and Vomiting    Sulfa (Sulfonamide Antibiotics) Other (comments)     Makes face flush        Prior to Admission medications    Medication Sig Start Date End Date Taking? Authorizing Provider   ciprofloxacin HCl (CIPRO) 500 mg tablet Take 1 Tab by mouth two (2) times a day for 7 days. 17  Arun Calvert MD   metroNIDAZOLE (FLAGYL) 500 mg tablet Take 1 Tab by mouth three (3) times daily for 4 days.  8/11/17 8/15/17  Arun Calvert MD   metroNIDAZOLE (FLAGYL) 500 mg tablet Take 1 Tab by mouth three (3) times daily.  Indications: BACTERIAL VAGINOSIS 8/8/17   Germania Garg MD   predniSONE (DELTASONE) 5 mg tablet TAKE 6 TABS BY MOUTH DAILY FOR 1 WEEK, THEN DROP BY 1 TAB DAILY EVERY WEEK UNTIL FINISHED 7/26/17   Historical Provider   LIALDA 1.2 gram delayed release tablet TAKE 2 TABLETS BY MOUTH EVERY DAY AS DIRECTED 7/26/17   Historical Provider   losartan (COZAAR) 25 mg tablet TAKE 1 TAB BY MOUTH DAILY FOR 90 DAYS. 7/23/17   Historical Provider       Review of Systems:  (bold if positive, if negative)    Gen:  Eyes:  ENT:  CVS:  Pulm:  GI:  Abdominal pain, nausea BRBPR  :    MS:  Skin:  Psych:  Endo:    Hem:  Renal:    Neuro:        Objective:      VITALS:    Vital signs reviewed; most recent are:    Visit Vitals    /53    Pulse 70    Temp 98.4 °F (36.9 °C)    Resp 22    Ht 5' 6\" (1.676 m)    Wt 95.3 kg (210 lb)    SpO2 100%    BMI 33.89 kg/m2     SpO2 Readings from Last 6 Encounters:   08/12/17 100%   08/11/17 98%   08/03/17 98%   01/04/17 96%        No intake or output data in the 24 hours ending 08/13/17 0028     Exam:     Physical Exam:    Gen:  Well-developed, well-nourished, in no acute distress  HEENT:  Pale conjunctivae, PERRL, hearing intact to voice, dry mucous membranes  Neck:  Supple, without masses, thyroid non-tender  Resp:  No accessory muscle use, clear breath sounds without wheezes rales or rhonchi  Card:  No murmurs, normal S1, S2 without thrills, bruits or peripheral edema  Abd:  Soft, ttp in lower quadrants, non-distended, hyperactive bowel sounds are present, no palpable organomegaly and no detectable hernias  Lymph:  No cervical or inguinal adenopathy  Musc:  No cyanosis or clubbing  Skin:  No rashes or ulcers, skin turgor is good  Neuro:  Cranial nerves are grossly intact, no focal motor weakness, follows commands appropriately  Psych:  Good insight, oriented to person, place and time, alert     Labs:    Recent Labs 08/12/17   2225   WBC  18.0*   HGB  10.4*   HCT  30.8*   PLT  343     Recent Labs      08/12/17   2225   NA  138   K  3.5   CL  103   CO2  26   GLU  184*   BUN  9   CREA  1.00   CA  8.3*   ALB  2.7*   TBILI  0.5   SGOT  9*   ALT  22     No results found for: GLUCPOC  No results for input(s): PH, PCO2, PO2, HCO3, FIO2 in the last 72 hours. Recent Labs      08/11/17   1732   INR  1.1     All Micro Results     Procedure Component Value Units Date/Time    C. DIFFICILE (DNA) [[id number]]     Order Status:  Sent         CT A/P\"    IMPRESSION: Findings are most compatible with a segment of diverticulitis  without evidence of perforation or drainable abscess at the junction of the  descending colon and sigmoid colon. This should be followed to exclude other  etiologies. This would be atypical for Crohn's disease since the remainder of  the bowel is unremarkable. There is no evidence of GI blood loss. .    I have reviewed previous records       Assessment and Plan: Active Problems:    Crohn's disease of both small and large intestine without complication / Diverticulitis / BRBPR (bright red blood per rectum) / Acute blood loss anemia. May represent a flare of IBD. Admit to medicine. GI consult. T&S and serial H/H. Transfuse for hgb less than 7. IV levofloxacin and flagyl. IVFs. Continue Lialda. On steroid taper, will hold prednisone and given methylpred 40 mg once, redosing per GI. Check C.diff, stool cx, stool WBC. Check ESR. Essential hypertension. Low-normal. Hold cozaar.     Telemetry reviewed:   normal sinus rhythm    Risk of deterioration: high      Total time spent with patient: 79 895 [address] discussed with: Patient and Nursing Staff    Discussed:  Care Plan       ___________________________________________________    Attending Physician: Ana Clark,

## 2017-08-13 NOTE — PROGRESS NOTES
Primary Nurse Pedro Pablo Acosta RN and Daniel Machuca RN performed a dual skin assessment on this patient No impairment noted  Ilan score is 22

## 2017-08-13 NOTE — PROGRESS NOTES
[facility], [place] [address]  [address] [facility], [address] [place] [address]  [phone]      Medical Progress Note      NAME:         Ellen Morocho   :        1965  MRM:        [id number]    Date:          2017      Subjective: Patient has been seen and examined as a follow up for acute diverticulitis, hematochezia. Chart, labs, diagnostics reviewed. She still has an intermittent cramping abdominal discomfort associated with bloody stools. No nausea or vomiting. No fever. Objective:    Vital Signs:    Visit Vitals    BP 97/53 (BP 1 Location: Left arm, BP Patient Position: At rest)    Pulse 77    Temp 98.8 °F (37.1 °C)    Resp 20    Ht 5' 6\" (1.676 m)    Wt 95.3 kg (210 lb)    LMP 2017    SpO2 98%    Breastfeeding No    BMI 33.89 kg/m2        Intake/Output Summary (Last 24 hours) at 17 1034  Last data filed at 17 1027   Gross per 24 hour   Intake                0 ml   Output              750 ml   Net             -750 ml        Physical Examination:    General:   Weak looking and not in much acute distress   Eyes:   pink conjunctivae, PERRLA with no discharge. ENT:   no ottorrhea or rhinorrhea with moist mucous membranes  Neck: no masses, thyroid non-tender and trachea central.  Pulm:  no accessory muscle use, clear breath sounds without crackles or wheezes  Card:  no JVD or murmurs, has regular and normal S1, S2 without thrills, bruits or peripheral edema  Abd:  Soft, generalized discomfort, non-distended, normoactive bowel sounds with no palpable organomegaly  Musc:  No cyanosis, clubbing, atrophy or deformities. Skin:  No rashes, bruising or ulcers. Neuro: Awake and alert.  Generally a non focal exam. Follows commands appropriately  Psych:  Has a good insight and is oriented x 3    Current Facility-Administered Medications   Medication Dose Route Frequency    sodium chloride (NS) flush 5-10 mL 5-10 mL IntraVENous Q8H    sodium chloride (NS) flush 5-10 mL  5-10 mL IntraVENous PRN    metroNIDAZOLE (FLAGYL) IVPB premix 500 mg  500 mg IntraVENous Q6H    [START ON 8/14/2017] levoFLOXacin (LEVAQUIN) 750 mg in D5W IVPB  750 mg IntraVENous Q24H    ondansetron (ZOFRAN) injection 4 mg  4 mg IntraVENous Q4H PRN    diphenhydrAMINE (BENADRYL) injection 12.5 mg  12.5 mg IntraVENous Q4H PRN    acetaminophen (TYLENOL) tablet 650 mg  650 mg Oral Q4H PRN    morphine injection 2 mg  2 mg IntraVENous Q4H PRN    magnesium citrate solution 296 mL  296 mL Oral BID    tuberculin injection 5 Units  5 Units IntraDERMal ONCE    sodium chloride (NS) flush 5-10 mL  5-10 mL IntraVENous Q8H    sodium chloride (NS) flush 5-10 mL  5-10 mL IntraVENous PRN    0.9% sodium chloride infusion  150 mL/hr IntraVENous CONTINUOUS        Laboratory data and review:    Recent Labs      08/13/17   0703  08/12/17 2225  08/11/17   1732   WBC   --   18.0*  15.5*   HGB  8.5*  10.4*  12.3   HCT  26.0*  30.8*  37.5   PLT   --   343  285     Recent Labs      08/12/17   2225  08/11/17   1732   NA  138  139   K  3.5  3.6   CL  103  103   CO2  26  29   GLU  184*  175*   BUN  9  9   CREA  1.00  0.99   CA  8.3*  8.9   ALB  2.7*  3.0*   SGOT  9*  10*   ALT  22  32   INR   --   1.1     No components found for: GLPOC      Assessment and Plan:    Acute Diverticulitis (8/12/2017): Ct scan abdomen showed a segment of diverticulitis without evidence of perforation or drainable abscess at the junction of the descending colon and sigmoid colon. Continue empiric IV Levofloxacin and Metronidazole. Follow stool studies    Acute blood loss anemia/ BRBPR (bright red blood per rectum) (8/13/2017): suspected to be diverticular. However, with prior hx if IBD, she's getting a colonoscopy in AM. Hgb dropped. Monitor and transfuse if this drops below 7. Crohn's disease of both small and large intestine without complication ([address] (0/8/3294): by Hx.  Unclear if this is a flare. Seen by GI. Getting a colonoscopy in AM     Essential hypertension (1/4/2017): BP low normal. Continue IV fluids and follow.  Hold Losartan     Total time spent for the patient's care: [address] discussed with: Patient and Nursing Staff    Discussed:  Care Plan and D/C Planning    Prophylaxis:  SCD's    Anticipated Disposition:  Home w/Family           ___________________________________________________    Attending Physician:   Mary Jo Willingham MD

## 2017-08-14 ENCOUNTER — ANESTHESIA (OUTPATIENT)
Dept: ENDOSCOPY | Age: 52
DRG: 378 | End: 2017-08-14
Payer: COMMERCIAL

## 2017-08-14 ENCOUNTER — ANESTHESIA EVENT (OUTPATIENT)
Dept: ENDOSCOPY | Age: 52
DRG: 378 | End: 2017-08-14
Payer: COMMERCIAL

## 2017-08-14 LAB
ATRIAL RATE: 58 BPM
C DIFF TOX GENS STL QL NAA+PROBE: NEGATIVE
CALCULATED P AXIS, ECG09: 2 DEGREES
CALCULATED R AXIS, ECG10: 8 DEGREES
CALCULATED T AXIS, ECG11: 36 DEGREES
DIAGNOSIS, 93000: NORMAL
HAV IGM SERPL QL IA: NONREACTIVE
HBV CORE IGM SER QL: NONREACTIVE
HBV SURFACE AG SER QL: <0.1 INDEX
HBV SURFACE AG SER QL: NEGATIVE
HCT VFR BLD AUTO: 23.2 % (ref 35–47)
HCV AB SERPL QL IA: NONREACTIVE
HCV COMMENT,HCGAC: NORMAL
HGB BLD-MCNC: 7.7 G/DL (ref 11.5–16)
P-R INTERVAL, ECG05: 112 MS
Q-T INTERVAL, ECG07: 426 MS
QRS DURATION, ECG06: 82 MS
QTC CALCULATION (BEZET), ECG08: 418 MS
SP1: NORMAL
SP2: NORMAL
SP3: NORMAL
VENTRICULAR RATE, ECG03: 58 BPM
WBC #/AREA STL HPF: >20 /HPF (ref 0–4)

## 2017-08-14 PROCEDURE — 82542 COL CHROMOTOGRAPHY QUAL/QUAN: CPT | Performed by: INTERNAL MEDICINE

## 2017-08-14 PROCEDURE — 0DBP8ZX EXCISION OF RECTUM, VIA NATURAL OR ARTIFICIAL OPENING ENDOSCOPIC, DIAGNOSTIC: ICD-10-PCS | Performed by: INTERNAL MEDICINE

## 2017-08-14 PROCEDURE — 99218 HC RM OBSERVATION: CPT

## 2017-08-14 PROCEDURE — 96366 THER/PROPH/DIAG IV INF ADDON: CPT

## 2017-08-14 PROCEDURE — 0DBL8ZX EXCISION OF TRANSVERSE COLON, VIA NATURAL OR ARTIFICIAL OPENING ENDOSCOPIC, DIAGNOSTIC: ICD-10-PCS | Performed by: INTERNAL MEDICINE

## 2017-08-14 PROCEDURE — 74011250636 HC RX REV CODE- 250/636: Performed by: INTERNAL MEDICINE

## 2017-08-14 PROCEDURE — 96361 HYDRATE IV INFUSION ADD-ON: CPT

## 2017-08-14 PROCEDURE — 74011250636 HC RX REV CODE- 250/636

## 2017-08-14 PROCEDURE — 77030009426 HC FCPS BIOP ENDOSC BSC -B: Performed by: INTERNAL MEDICINE

## 2017-08-14 PROCEDURE — 76060000031 HC ANESTHESIA FIRST 0.5 HR: Performed by: INTERNAL MEDICINE

## 2017-08-14 PROCEDURE — 74011000250 HC RX REV CODE- 250: Performed by: INTERNAL MEDICINE

## 2017-08-14 PROCEDURE — 76040000019: Performed by: INTERNAL MEDICINE

## 2017-08-14 PROCEDURE — 85018 HEMOGLOBIN: CPT | Performed by: INTERNAL MEDICINE

## 2017-08-14 PROCEDURE — 65270000029 HC RM PRIVATE

## 2017-08-14 PROCEDURE — 77030013992 HC SNR POLYP ENDOSC BSC -B: Performed by: INTERNAL MEDICINE

## 2017-08-14 PROCEDURE — 36415 COLL VENOUS BLD VENIPUNCTURE: CPT | Performed by: INTERNAL MEDICINE

## 2017-08-14 PROCEDURE — 88305 TISSUE EXAM BY PATHOLOGIST: CPT | Performed by: INTERNAL MEDICINE

## 2017-08-14 PROCEDURE — 74011000250 HC RX REV CODE- 250

## 2017-08-14 RX ORDER — PROPOFOL 10 MG/ML
INJECTION, EMULSION INTRAVENOUS
Status: DISCONTINUED | OUTPATIENT
Start: 2017-08-14 | End: 2017-08-14 | Stop reason: HOSPADM

## 2017-08-14 RX ORDER — FLUMAZENIL 0.1 MG/ML
0.2 INJECTION INTRAVENOUS
Status: DISCONTINUED | OUTPATIENT
Start: 2017-08-14 | End: 2017-08-14 | Stop reason: HOSPADM

## 2017-08-14 RX ORDER — PROPOFOL 10 MG/ML
INJECTION, EMULSION INTRAVENOUS AS NEEDED
Status: DISCONTINUED | OUTPATIENT
Start: 2017-08-14 | End: 2017-08-14 | Stop reason: HOSPADM

## 2017-08-14 RX ORDER — DEXTROMETHORPHAN/PSEUDOEPHED 2.5-7.5/.8
1.2 DROPS ORAL
Status: DISCONTINUED | OUTPATIENT
Start: 2017-08-14 | End: 2017-08-14 | Stop reason: HOSPADM

## 2017-08-14 RX ORDER — EPINEPHRINE 0.1 MG/ML
1 INJECTION INTRACARDIAC; INTRAVENOUS
Status: DISCONTINUED | OUTPATIENT
Start: 2017-08-14 | End: 2017-08-14 | Stop reason: HOSPADM

## 2017-08-14 RX ORDER — MIDAZOLAM HYDROCHLORIDE 1 MG/ML
.25-5 INJECTION, SOLUTION INTRAMUSCULAR; INTRAVENOUS
Status: DISCONTINUED | OUTPATIENT
Start: 2017-08-14 | End: 2017-08-14 | Stop reason: HOSPADM

## 2017-08-14 RX ORDER — LIDOCAINE HYDROCHLORIDE 20 MG/ML
INJECTION, SOLUTION EPIDURAL; INFILTRATION; INTRACAUDAL; PERINEURAL AS NEEDED
Status: DISCONTINUED | OUTPATIENT
Start: 2017-08-14 | End: 2017-08-14 | Stop reason: HOSPADM

## 2017-08-14 RX ORDER — ATROPINE SULFATE 0.1 MG/ML
0.5 INJECTION INTRAVENOUS
Status: DISCONTINUED | OUTPATIENT
Start: 2017-08-14 | End: 2017-08-14 | Stop reason: HOSPADM

## 2017-08-14 RX ORDER — NALOXONE HYDROCHLORIDE 0.4 MG/ML
0.4 INJECTION, SOLUTION INTRAMUSCULAR; INTRAVENOUS; SUBCUTANEOUS
Status: DISCONTINUED | OUTPATIENT
Start: 2017-08-14 | End: 2017-08-14 | Stop reason: HOSPADM

## 2017-08-14 RX ORDER — FENTANYL CITRATE 50 UG/ML
100 INJECTION, SOLUTION INTRAMUSCULAR; INTRAVENOUS
Status: DISCONTINUED | OUTPATIENT
Start: 2017-08-14 | End: 2017-08-14 | Stop reason: HOSPADM

## 2017-08-14 RX ORDER — SODIUM CHLORIDE 9 MG/ML
50 INJECTION, SOLUTION INTRAVENOUS CONTINUOUS
Status: DISPENSED | OUTPATIENT
Start: 2017-08-14 | End: 2017-08-14

## 2017-08-14 RX ADMIN — METRONIDAZOLE 500 MG: 500 INJECTION, SOLUTION INTRAVENOUS at 14:47

## 2017-08-14 RX ADMIN — PROPOFOL 125 MCG/KG/MIN: 10 INJECTION, EMULSION INTRAVENOUS at 12:26

## 2017-08-14 RX ADMIN — METRONIDAZOLE 500 MG: 500 INJECTION, SOLUTION INTRAVENOUS at 21:55

## 2017-08-14 RX ADMIN — LEVOFLOXACIN 750 MG: 5 INJECTION, SOLUTION INTRAVENOUS at 01:12

## 2017-08-14 RX ADMIN — PROPOFOL 40 MG: 10 INJECTION, EMULSION INTRAVENOUS at 12:28

## 2017-08-14 RX ADMIN — SODIUM CHLORIDE 50 ML/HR: 900 INJECTION, SOLUTION INTRAVENOUS at 10:52

## 2017-08-14 RX ADMIN — PROPOFOL 30 MG: 10 INJECTION, EMULSION INTRAVENOUS at 12:32

## 2017-08-14 RX ADMIN — LIDOCAINE HYDROCHLORIDE 30 MG: 20 INJECTION, SOLUTION EPIDURAL; INFILTRATION; INTRACAUDAL; PERINEURAL at 12:26

## 2017-08-14 RX ADMIN — METRONIDAZOLE 500 MG: 500 INJECTION, SOLUTION INTRAVENOUS at 05:49

## 2017-08-14 RX ADMIN — Medication 10 ML: at 14:00

## 2017-08-14 RX ADMIN — PROPOFOL 30 MG: 10 INJECTION, EMULSION INTRAVENOUS at 12:30

## 2017-08-14 RX ADMIN — LEVOFLOXACIN 750 MG: 5 INJECTION, SOLUTION INTRAVENOUS at 23:57

## 2017-08-14 RX ADMIN — SODIUM CHLORIDE AND POTASSIUM CHLORIDE: 9; 2.98 INJECTION, SOLUTION INTRAVENOUS at 09:47

## 2017-08-14 RX ADMIN — PROPOFOL 100 MG: 10 INJECTION, EMULSION INTRAVENOUS at 12:26

## 2017-08-14 NOTE — PROGRESS NOTES
[facility] [address]  [address] [facility], [address] [place] [address]  [phone]      Medical Progress Note      NAME:         Parisa Ulrich   :        1965  MRM:        [id number]    Date:          2017      Subjective: Patient has been seen and examined as a follow up for acute diverticulitis, hematochezia. Chart, labs, diagnostics reviewed. She says she still has a mild intermittent cramping abdominal discomfort associated with much less blood in stools. Has some dysuria but no fever or chills. No vaginal bleeding. Objective:    Vital Signs:    Visit Vitals    /78 (BP 1 Location: Left arm, BP Patient Position: At rest)    Pulse 78    Temp 98.1 °F (36.7 °C)    Resp 16    Ht 5' 6\" (1.676 m)    Wt 95.3 kg (210 lb)    LMP 2017    SpO2 99%    Breastfeeding No    BMI 33.89 kg/m2          Intake/Output Summary (Last 24 hours) at 17 0818  Last data filed at 17 0616   Gross per 24 hour   Intake             2325 ml   Output              350 ml   Net             1975 ml      Physical Examination:    General:   Weak looking and not in much acute distress   Eyes:   pink conjunctivae, PERRLA with no discharge. ENT:   no ottorrhea or rhinorrhea with moist mucous membranes  Neck: no masses, thyroid non-tender and trachea central.  Pulm: clear breath sounds without crackles or wheezes  Card:  no JVD or murmurs, has regular and normal S1, S2 without thrills, bruits or peripheral edema  Abd:  Soft, generalized discomfort, non-distended, normoactive bowel sounds   Musc:  No cyanosis, clubbing, atrophy or deformities. Skin:  No rashes, bruising or ulcers. Neuro: Awake and alert.  Generally a non focal exam. Follows commands appropriately  Psych:  Has a good insight and is oriented x 3    Current Facility-Administered Medications   Medication Dose Route Frequency    sodium chloride (NS) flush 5-10 mL 5-10 mL IntraVENous Q8H    sodium chloride (NS) flush 5-10 mL  5-10 mL IntraVENous PRN    levoFLOXacin (LEVAQUIN) 750 mg in D5W IVPB  750 mg IntraVENous Q24H    ondansetron (ZOFRAN) injection 4 mg  4 mg IntraVENous Q4H PRN    diphenhydrAMINE (BENADRYL) injection 12.5 mg  12.5 mg IntraVENous Q4H PRN    acetaminophen (TYLENOL) tablet 650 mg  650 mg Oral Q4H PRN    morphine injection 2 mg  2 mg IntraVENous Q4H PRN    metroNIDAZOLE (FLAGYL) IVPB premix 500 mg  500 mg IntraVENous Q8H    0.9% sodium chloride with KCl 40 mEq/L infusion   IntraVENous CONTINUOUS    Tuberculin PPD - please read at 24hrs, 48 hrs, and 72 hrs after placement  1 Each Other Q24H    sodium chloride (NS) flush 5-10 mL  5-10 mL IntraVENous Q8H    sodium chloride (NS) flush 5-10 mL  5-10 mL IntraVENous PRN        Laboratory data and review:    Recent Labs      08/14/17   0452  08/13/17   2131  08/13/17   1407   08/12/17   2225  08/11/17   1732   WBC   --    --    --    --   18.0*  15.5*   HGB  7.7*  8.5*  8.9*   < >  10.4*  12.3   HCT  23.2*  25.2*  27.5*   < >  30.8*  37.5   PLT   --    --    --    --   343  285    < > = values in this interval not displayed. Recent Labs      08/12/17   2225  08/11/17   1732   NA  138  139   K  3.5  3.6   CL  103  103   CO2  26  29   GLU  184*  175*   BUN  9  9   CREA  1.00  0.99   CA  8.3*  8.9   ALB  2.7*  3.0*   SGOT  9*  10*   ALT  22  32   INR   --   1.1     No components found for: GLPOC      Assessment and Plan:    Acute Diverticulitis (8/12/2017): CT scan abdomen showed a segment of diverticulitis without evidence of perforation or drainable abscess at the junction of the descending colon and sigmoid colon. Continue empiric IV Levofloxacin and Metronidazole. Stool studies still pending. NPO awaiting colonoscopy    Acute blood loss anemia/ BRBPR (bright red blood per rectum) (8/13/2017): suspected to be diverticular.  However, with prior hx if IBD, she's getting a colonoscopy in AM. Hgb has continued to drop but bleeding much less. Decrease IV fluid rate. Check CBC in AM and transfuse if this drops below 7. Crohn's disease of both small and large intestine without complication ([facility] [address] (0/5/9179): by Hx. Unclear if this is a flare. Seen by GI. Getting a colonoscopy in AM     Essential hypertension (1/4/2017): BP better now. Decrease IV fluids. Resume Losartan after colonoscopy    Hx Uterine fibroids POA: she tells me this was recently diagnosed.  Has a scheduled outpatient follow up    Total time spent for the patient's care: [address] discussed with: Patient and Nursing Staff    Discussed:  Care Plan and D/C Planning    Prophylaxis:  SCD's    Anticipated Disposition:  Home w/Family           ___________________________________________________    Attending Physician:   Salena Cabrera MD

## 2017-08-14 NOTE — PROGRESS NOTES
Yoana Irene  1965  [id number]    Situation:  Verbal report received from: Ascencion Herron RN  Procedure: Procedure(s):  COLONOSCOPY    Background:    Preoperative diagnosis: anemia  Postoperative diagnosis: diverticulitis, ulcerative colitits, rectal polyp    :  Dr. Mittal Main  Assistant(s): Endoscopy RN-1: Margot Navarrete RN    Specimens:   ID Type Source Tests Collected by Time Destination   1 : biopsy transverse colon Preservative Colon, Transverse  Vcikie Jones MD 8/14/2017 1252 Pathology   2 : rectal polyp Preservative Rectum  Vickie Jones MD 8/14/2017 1253 Pathology     H. Pylori  no    Assessment:  Intra-procedure medications   Anesthesia gave intra-procedure sedation and medications, see anesthesia flow sheet yes    Intravenous fluids: NS@ KVO     Vital signs stable yes    Abdominal assessment: round and soft yes    Recommendation:  Discharge patient per MD order yes.   Return to floor yes  Family or Friend no  Permission to share finding with family or friend yes

## 2017-08-14 NOTE — ANESTHESIA PREPROCEDURE EVALUATION
Anesthetic History   No history of anesthetic complications            Review of Systems / Medical History  Patient summary reviewed and pertinent labs reviewed    Pulmonary  Within defined limits                 Neuro/Psych   Within defined limits           Cardiovascular    Hypertension                   GI/Hepatic/Renal               Comments: Inflammatory bowel disease Endo/Other        Anemia    Comments: Blood loss anemia Other Findings              Physical Exam    Airway  Mallampati: II  TM Distance: 4 - 6 cm  Neck ROM: normal range of motion   Mouth opening: Normal     Cardiovascular  Regular rate and rhythm,  S1 and S2 normal,  no murmur, click, rub, or gallop  Rhythm: regular  Rate: normal         Dental  No notable dental hx       Pulmonary  Breath sounds clear to auscultation               Abdominal  GI exam deferred       Other Findings            Anesthetic Plan    ASA: 2  Anesthesia type: MAC            Anesthetic plan and risks discussed with: Patient

## 2017-08-14 NOTE — CDMP QUERY
1.    The [address] [place] [address] has issued a statement indicating that, \"Individuals who are overweight, obese, or morbidly obese are at an increased risk for certain medical conditions when compared to persons of normal weight. Therefore, these conditions are always clinically significant and reportable when documented by the provider. \"    Review of the documentation for this patient demonstrates the clinical indicators of a BMI of 33.9  (height  of  5 6 with weight of  95.2 kg ). Please clarify if the patient has a diagnosis associated with those findings:  _____  Obesity (BMI of 30-39. 9)   _____  Morbid Obesity  (BMI 40 or greater)  ______Overweight (BMI 25-29. 9)  _____  Other weight status (specify  status)  _____  Unable to determine    Thanks for your time.     June Brito RN, BSN  [phone]

## 2017-08-14 NOTE — PROGRESS NOTES
Problem: Falls - Risk of  Goal: *Absence of Falls  Document Kentrell Fall Risk and appropriate interventions in the flowsheet.    Outcome: Progressing Towards Goal  Fall Risk Interventions:  Mobility Interventions: Communicate number of staff needed for ambulation/transfer, Patient to call before getting OOB           Medication Interventions: Bed/chair exit alarm, Patient to call before getting OOB     Elimination Interventions: Call light in reach, Patient to call for help with toileting needs     History of Falls Interventions: Consult care management for discharge planning, Door open when patient unattended, Utilize gait belt for transfer/ambulation

## 2017-08-14 NOTE — ANESTHESIA POSTPROCEDURE EVALUATION
Post-Anesthesia Evaluation and Assessment    Patient: Lewis He MRN: [id number]  SSN: [id number]    YOB: 1965  Age: 50 y.o. Sex: female       Cardiovascular Function/Vital Signs  Visit Vitals    BP 96/58    Pulse 74    Temp 36.2 °C (97.1 °F)    Resp 19    Ht 5' 6\" (1.676 m)    Wt 95.3 kg (210 lb)    SpO2 100%    Breastfeeding No    BMI 33.89 kg/m2       Patient is status post MAC anesthesia for Procedure(s):  COLONOSCOPY  ENDOSCOPIC POLYPECTOMY  COLON BIOPSY. Nausea/Vomiting: None    Postoperative hydration reviewed and adequate. Pain:  Pain Scale 1: Numeric (0 - 10) (08/14/17 1300)  Pain Intensity 1: 0 (08/14/17 1300)   Managed    Neurological Status: At baseline    Mental Status and Level of Consciousness: Arousable    Pulmonary Status:   O2 Device: Room air (08/14/17 1300)   Adequate oxygenation and airway patent    Complications related to anesthesia: None    Post-anesthesia assessment completed.  No concerns    Signed By: Callum Stephenson MD     August 14, 2017

## 2017-08-14 NOTE — ROUTINE PROCESS
Pt for colonoscopy in am pt able to tolerates 2 bottle of mag.citrate but unable to tolerate golytely that was started during shift report. Pt had light green liquid stool w/o blood 3x now. Notified on call GI no orders received.

## 2017-08-14 NOTE — PROGRESS NOTES
Bedside and Verbal shift change report given to Jamila (oncoming nurse) by Dorothea Hawkins (offgoing nurse). Report included the following information SBAR, Kardex, MAR and Recent Results.

## 2017-08-14 NOTE — PERIOP NOTES
[facility]  1965  [id number]    Situation:    Scheduled Procedure: Procedure(s):  COLONOSCOPY  Verbal report received from: Rossana Cisneros RN  Preoperative diagnosis: anemia    Background:    Procedure: Procedure(s):  COLONOSCOPY  Physician performing procedure; Dr. Fredis Guerrero RN    NPO Status/Last PO Intake: midnight    Pregnancy Test:Not applicable If yes, result: none    Is the patient taking Blood Thinners: NO If yes, list:  and last taken   Is the patient diabetic:no       If yes, what was the last BS:    Time taken? Anything given? no           Does the patient have a Pacemaker/Defibrillator in place?: no   Does the patient need antibiotics before/during/after procedure: no   If the patient is having a colon, How much prep was drank? 75%   What were the Colon prep results? Clear per patient description   Does the patient have SCD in place:no   Is patient on CONTACT precautions:yes        If yes, what kind of CONTACT precautions: enteric    Assessment:  Are the vital signs stable prior to patient coming to ENDO?  yes  Is the patient alert/oriented and able to sign consent for the procedures:yes    Does the patient have a patient IV in place?  yes     Recommendation:  Family or Friend present no     Permission to share finding with Family or Friend n/a

## 2017-08-14 NOTE — PERIOP NOTES
TRANSFER - OUT REPORT:    Verbal report given to Maricarmen(name) on Westley Chan  being transferred to [address] [place] [address] for routine post - op       Report consisted of patients Situation, Background, Assessment and   Recommendations(SBAR). Information from the following report(s) Procedure Summary, Intake/Output, MAR, Recent Results and Med Rec Status was reviewed with the receiving nurse. Lines:   Peripheral IV 08/12/17 Right Antecubital (Active)   Site Assessment Clean, dry, & intact; Bleeding 8/14/2017 10:52 AM   Phlebitis Assessment 0 8/14/2017 10:52 AM   Infiltration Assessment 0 8/14/2017 10:52 AM   Dressing Status Loose; Wet 8/14/2017 10:52 AM   Dressing Type Tape;Transparent 8/14/2017 10:52 AM   Hub Color/Line Status Pink 8/14/2017 10:52 AM   Action Taken Open ports on tubing capped 8/13/2017  7:39 PM   Alcohol Cap Used Yes 8/14/2017 10:52 AM       Peripheral IV 08/14/17 Left Forearm (Active)   Site Assessment Clean, dry, & intact 8/14/2017 10:49 AM   Phlebitis Assessment 0 8/14/2017 10:49 AM   Infiltration Assessment 0 8/14/2017 10:49 AM   Dressing Status Clean, dry, & intact 8/14/2017 10:49 AM   Dressing Type Tape;Transparent 8/14/2017 10:49 AM   Hub Color/Line Status Pink; Infusing 8/14/2017 10:49 AM        Opportunity for questions and clarification was provided. Patient transported with:  Transport Tech and chart.

## 2017-08-14 NOTE — PROGRESS NOTES
Bedside and Verbal shift change report given to LAMAR Hernadez(oncoming nurse) by Ovid Dakin (offgoing nurse). Report included the following information SBAR.

## 2017-08-14 NOTE — PROCEDURES
[address]  [phone]      2017    Colonoscopy Procedure Note  Edwige Jacobo  :  1965  [facility] Medical Record Number: [id number]    Indications:     Anemia, Lower rectal bleeding  PCP:  Elian Suarez MD  Anesthesia/Sedation: Conscious Sedation/Moderate Sedation  Endoscopist:  Dr. Deondre Crabtree  Complications:  None  Estimate Blood Loss:  None    Permit:  The indications, risks, benefits and alternatives were reviewed with the patient or their decision maker who was provided an opportunity to ask questions and all questions were answered. The specific risks of colonoscopy with conscious sedation were reviewed, including but not limited to anesthetic complication, bleeding, adverse drug reaction, missed lesion, infection, IV site reactions, and intestinal perforation which would lead to the need for surgical repair. Alternatives to colonoscopy including radiographic imaging, observation without testing, or laboratory testing were reviewed including the limitations of those alternatives. After considering the options and having all their questions answered, the patient or their decision maker provided both verbal and written consent to proceed. Procedure in Detail:  After obtaining informed consent, positioning of the patient in the left lateral decubitus position, and conduction of a pre-procedure pause or \"time out\" the endoscope was introduced into the anus and advanced to the terminal ileum. The quality of the colonic preparation was adequate. A careful inspection was made as the colonoscope was withdrawn, findings and interventions are described below.     Appendiceal orifice photographed    Findings:   Ileum is normal  Entire colon has variable sized and depth ulceration with purulent exudate  Sigmoid colon also has diverticulosis with edema, submucosal hemorrhage and purulent exudate from the diverticulosis in this area. Fistula seen at anus  Anorectal sessile polyp    Specimens:    biopsies ulcers at mid transverse colon; anal rectal polyp removed cold snare    Complications:   None; patient tolerated the procedure well. Estimated blood loss: none    Impression:  active colonic crohn's disease with colonic diverticulitis superimposed. Has anal crohn's disease; has anorectal polyp    Recommendations:     - Repeat colonoscopy when completing antibiotics to be certain resolution diverticulitis prior to biologic therapy. - IV antibiotics to resolution diverticulitis prior to consideration biologics therapy Crohn's disease    Thank you for entrusting me with this patient's care. Please do not hesitate to contact me with any questions or if I can be of assistance with any of your other patients' GI needs.     Signed By: Uche Gupta MD                        August 14, 2017

## 2017-08-15 ENCOUNTER — APPOINTMENT (OUTPATIENT)
Dept: GENERAL RADIOLOGY | Age: 52
DRG: 378 | End: 2017-08-15
Attending: INTERNAL MEDICINE
Payer: COMMERCIAL

## 2017-08-15 LAB
APPEARANCE FLD: ABNORMAL
BACTERIA SPEC CULT: NORMAL
BASOPHILS # BLD AUTO: 0 K/UL (ref 0–0.1)
BASOPHILS # BLD: 0 % (ref 0–1)
BODY FLD TYPE: NORMAL
C JEJUNI+C COLI AG STL QL: NEGATIVE
COLOR FLD: ABNORMAL
CRYSTALS FLD MICRO: NEGATIVE
E COLI SXT1+2 STL IA: NEGATIVE
EOSINOPHIL # BLD: 0 K/UL (ref 0–0.4)
EOSINOPHIL NFR BLD: 1 % (ref 0–7)
ERYTHROCYTE [DISTWIDTH] IN BLOOD BY AUTOMATED COUNT: 13.3 % (ref 11.5–14.5)
HCT VFR BLD AUTO: 22 % (ref 35–47)
HGB BLD-MCNC: 7.3 G/DL (ref 11.5–16)
LYMPHOCYTES # BLD AUTO: 30 % (ref 12–49)
LYMPHOCYTES # BLD: 2.1 K/UL (ref 0.8–3.5)
LYMPHOCYTES NFR FLD: 26 %
MCH RBC QN AUTO: 31.2 PG (ref 26–34)
MCHC RBC AUTO-ENTMCNC: 33.2 G/DL (ref 30–36.5)
MCV RBC AUTO: 94 FL (ref 80–99)
MONOCYTES # BLD: 0.5 K/UL (ref 0–1)
MONOCYTES NFR BLD AUTO: 7 % (ref 5–13)
MONOS+MACROS NFR FLD: 8 %
NEUTS SEG # BLD: 4.5 K/UL (ref 1.8–8)
NEUTS SEG NFR BLD AUTO: 62 % (ref 32–75)
NEUTS SEG NFR FLD: 66 %
NUC CELL # FLD: 274 /CU MM (ref 0–5)
PLATELET # BLD AUTO: 247 K/UL (ref 150–400)
RBC # BLD AUTO: 2.34 M/UL (ref 3.8–5.2)
RBC # FLD: >100 /CU MM
SERVICE CMNT-IMP: NORMAL
SPECIMEN SOURCE FLD: ABNORMAL
WBC # BLD AUTO: 7.1 K/UL (ref 3.6–11)

## 2017-08-15 PROCEDURE — 0S9C3ZX DRAINAGE OF RIGHT KNEE JOINT, PERCUTANEOUS APPROACH, DIAGNOSTIC: ICD-10-PCS | Performed by: ORTHOPAEDIC SURGERY

## 2017-08-15 PROCEDURE — 87205 SMEAR GRAM STAIN: CPT | Performed by: PHYSICIAN ASSISTANT

## 2017-08-15 PROCEDURE — 99218 HC RM OBSERVATION: CPT

## 2017-08-15 PROCEDURE — 36415 COLL VENOUS BLD VENIPUNCTURE: CPT | Performed by: INTERNAL MEDICINE

## 2017-08-15 PROCEDURE — 65270000029 HC RM PRIVATE

## 2017-08-15 PROCEDURE — 20610 DRAIN/INJ JOINT/BURSA W/O US: CPT

## 2017-08-15 PROCEDURE — 74011000250 HC RX REV CODE- 250: Performed by: PHYSICIAN ASSISTANT

## 2017-08-15 PROCEDURE — 74011250636 HC RX REV CODE- 250/636: Performed by: INTERNAL MEDICINE

## 2017-08-15 PROCEDURE — 73562 X-RAY EXAM OF KNEE 3: CPT

## 2017-08-15 PROCEDURE — 74011000250 HC RX REV CODE- 250: Performed by: INTERNAL MEDICINE

## 2017-08-15 PROCEDURE — 85025 COMPLETE CBC W/AUTO DIFF WBC: CPT | Performed by: INTERNAL MEDICINE

## 2017-08-15 PROCEDURE — 74011250637 HC RX REV CODE- 250/637: Performed by: INTERNAL MEDICINE

## 2017-08-15 PROCEDURE — 89060 EXAM SYNOVIAL FLUID CRYSTALS: CPT | Performed by: PHYSICIAN ASSISTANT

## 2017-08-15 PROCEDURE — 89050 BODY FLUID CELL COUNT: CPT | Performed by: PHYSICIAN ASSISTANT

## 2017-08-15 RX ORDER — LIDOCAINE HYDROCHLORIDE 10 MG/ML
4 INJECTION INFILTRATION; PERINEURAL ONCE
Status: COMPLETED | OUTPATIENT
Start: 2017-08-15 | End: 2017-08-15

## 2017-08-15 RX ADMIN — LIDOCAINE HYDROCHLORIDE 4 ML: 10 INJECTION, SOLUTION INFILTRATION; PERINEURAL at 16:11

## 2017-08-15 RX ADMIN — Medication 10 ML: at 21:47

## 2017-08-15 RX ADMIN — Medication 10 ML: at 07:40

## 2017-08-15 RX ADMIN — Medication 10 ML: at 13:29

## 2017-08-15 RX ADMIN — METRONIDAZOLE 500 MG: 500 INJECTION, SOLUTION INTRAVENOUS at 06:24

## 2017-08-15 RX ADMIN — ACETAMINOPHEN 650 MG: 325 TABLET ORAL at 13:25

## 2017-08-15 RX ADMIN — METRONIDAZOLE 500 MG: 500 INJECTION, SOLUTION INTRAVENOUS at 13:26

## 2017-08-15 RX ADMIN — METRONIDAZOLE 500 MG: 500 INJECTION, SOLUTION INTRAVENOUS at 21:32

## 2017-08-15 RX ADMIN — SODIUM CHLORIDE AND POTASSIUM CHLORIDE: 9; 2.98 INJECTION, SOLUTION INTRAVENOUS at 07:40

## 2017-08-15 RX ADMIN — ACETAMINOPHEN 650 MG: 325 TABLET ORAL at 21:45

## 2017-08-15 NOTE — ROUTINE PROCESS
Bedside and Verbal shift change report given to Jennifer Murillo RN (oncoming nurse) by LAMAR Stinson (offgoing nurse). Report included the following information SBAR, Kardex, ED Summary, Procedure Summary, Intake/Output, MAR, Accordion, Recent Results and Med Rec Status.

## 2017-08-15 NOTE — CONSULTS
ORTHO  CONSULT    Subjective:     Date of Consultation:  August 15, 2017    Referring Physician:  Dr. Feliciana Spatz is a 50 y.o. female admitted for Diverticulitis and GI bleed. Pt. On abx for infected Diverticuli. Had colonoscopy yesterday and has developed R knee pain and swelling. No history of knee problems and no Ortho following in the past. Pt. States woke up from Colonoscopy with this pain. No other complaints per pt. Patient Active Problem List    Diagnosis Date Noted    BRBPR (bright red blood per rectum) 08/13/2017    Diverticulitis 08/12/2017    Uterine leiomyoma 2010, 2015 08/03/2017    Crohn's colitis ([facility] [address] fissure 2007 08/03/2017    Essential hypertension 01/04/2017    Crohn's disease of both small and large intestine without complication ([facility] [address] 60/26/1866     History reviewed. No pertinent family history. Social History   Substance Use Topics    Smoking status: Never Smoker    Smokeless tobacco: Never Used      Comment: some smoking in college    Alcohol use 1.8 oz/week     3 Glasses of wine per week     Past Medical History:   Diagnosis Date    Crohn's colitis ([facility] [address]     Hypertension     Ill-defined condition     history of  ulcerative colitis      Past Surgical History:   Procedure Laterality Date    COLONOSCOPY N/A 8/14/2017    COLONOSCOPY performed by Tran Espinosa MD at [address] [facility] [facility] HX CHOLECYSTECTOMY N/A     HX TONSIL AND ADENOIDECTOMY Bilateral     HX TONSIL AND ADENOIDECTOMY      HX WISDOM TEETH EXTRACTION        Prior to Admission medications    Medication Sig Start Date End Date Taking? Authorizing Provider   mesalamine (LIALDA) 1.2 gram delayed release tablet Take 4.8 g by mouth daily. Yes Historical Provider   ciprofloxacin HCl (CIPRO) 500 mg tablet Take 500 mg by mouth two (2) times a day. 7 day course 8/11-8/17 8/11/17 8/17/17 Yes Historical Provider   predniSONE (DELTASONE) 5 mg tablet Take 25 mg by mouth daily.    Yes Historical Provider   metroNIDAZOLE (FLAGYL) 500 mg tablet Take 500 mg by mouth three (3) times daily. 17 Yes Historical Provider   losartan (COZAAR) 25 mg tablet TAKE 1 TAB BY MOUTH DAILY FOR 90 DAYS. 17  Yes Historical Provider     Current Facility-Administered Medications   Medication Dose Route Frequency    sodium chloride (NS) flush 5-10 mL  5-10 mL IntraVENous Q8H    sodium chloride (NS) flush 5-10 mL  5-10 mL IntraVENous PRN    levoFLOXacin (LEVAQUIN) 750 mg in D5W IVPB  750 mg IntraVENous Q24H    ondansetron (ZOFRAN) injection 4 mg  4 mg IntraVENous Q4H PRN    diphenhydrAMINE (BENADRYL) injection 12.5 mg  12.5 mg IntraVENous Q4H PRN    acetaminophen (TYLENOL) tablet 650 mg  650 mg Oral Q4H PRN    morphine injection 2 mg  2 mg IntraVENous Q4H PRN    metroNIDAZOLE (FLAGYL) IVPB premix 500 mg  500 mg IntraVENous Q8H    Tuberculin PPD - please read at 24hrs, 48 hrs, and 72 hrs after placement  1 Each Other Q24H    sodium chloride (NS) flush 5-10 mL  5-10 mL IntraVENous Q8H    sodium chloride (NS) flush 5-10 mL  5-10 mL IntraVENous PRN     Allergies   Allergen Reactions    Codeine Nausea and Vomiting    Sulfa (Sulfonamide Antibiotics) Other (comments)     Makes face flush         Review of Systems:  A comprehensive review of systems was negative except for that written in the HPI. Objective:     Patient Vitals for the past 8 hrs:   BP Temp Pulse Resp SpO2   08/15/17 1546 111/82 98.5 °F (36.9 °C) 80 18 99 %   08/15/17 1216 132/82 98.3 °F (36.8 °C) 81 16 100 %     Temp (24hrs), Av.4 °F (36.9 °C), Min:98 °F (36.7 °C), Max:98.8 °F (37.1 °C)        EXAM: I examined pt's R knee. Full extension, Limited flexion due to large effusion, no ligamentous laxity with valgus and varus stress. No laxity with Anterior or Posterior drawer test. Negative Homans sign. Negative Lachmans sign, Negative McMurrays sign. No joint line tenderness to palpation.  Large effusion, no erythema, no ecchymosis. NVI distally. X-rays show Mild tricompartmental Osteoarthritis. Data Review   Recent Results (from the past 24 hour(s))   CBC WITH AUTOMATED DIFF    Collection Time: 08/15/17  3:17 AM   Result Value Ref Range    WBC 7.1 3.6 - 11.0 K/uL    RBC 2.34 (L) 3.80 - 5.20 M/uL    HGB 7.3 (L) 11.5 - 16.0 g/dL    HCT 22.0 (L) 35.0 - 47.0 %    MCV 94.0 80.0 - 99.0 FL    MCH 31.2 26.0 - 34.0 PG    MCHC 33.2 30.0 - 36.5 g/dL    RDW 13.3 11.5 - 14.5 %    PLATELET 888 281 - 271 K/uL    NEUTROPHILS 62 32 - 75 %    LYMPHOCYTES 30 12 - 49 %    MONOCYTES 7 5 - 13 %    EOSINOPHILS 1 0 - 7 %    BASOPHILS 0 0 - 1 %    ABS. NEUTROPHILS 4.5 1.8 - 8.0 K/UL    ABS. LYMPHOCYTES 2.1 0.8 - 3.5 K/UL    ABS. MONOCYTES 0.5 0.0 - 1.0 K/UL    ABS. EOSINOPHILS 0.0 0.0 - 0.4 K/UL    ABS. BASOPHILS 0.0 0.0 - 0.1 K/UL         Assessment/Plan:     Right Knee Mild Osteoarthritis      Plan:  Procedure: I explained the risk vs. Benefits to pt. in regards to aspirating the R knee. Pt. states they understand and give verbal and written consent for the procedure. Under sterile conditions, I aspirated 80ml Clear, Serous pink fluid from the R knee, No signs of infection noted. I injected SQ area of the superior lateral aspect of the R knee with 2ml Lidocaine,Pt. Tolerated the Procedure well. Bleeding controlled. Synovial fluid sent to lab for cell count, culture and crystals. Results should be later today, if no infection will plan for cortisone injection tomorrow. Gregory Adair agrees with plan. Thank you for allowing us to take part in this patients care. Alexey Billingsley PA-C  Orthopaedic Surgery [address]  [address]  [phone] PA-C

## 2017-08-15 NOTE — PROGRESS NOTES
08/15/17   Met with patient to complete assessment and discuss discharge plans. Address on face sheet confirmed. She lives in a tri-level home with 2 roommates. She was working prior to admission so has been independent and driving PTA. She uses no medical equipment and never had HH. Her PCP is Dr. Connor Marin. She uses [facility] [address]. Her cousin is DON. She can get transportation home. Care Management Interventions  PCP Verified by CM: Yes (Dr. Howard Wynn)  Palliative Care Consult (Criteria: CHF and RRAT>21): No  Reason for No Palliative Care Consult: Other (see comment) (not relevant)  Transition of Care Consult (CM Consult): Discharge Planning  Physical Therapy Consult: No  Occupational Therapy Consult: No  Speech Therapy Consult: No  Current Support Network:  Other (lives with 2 roommates)  Confirm Follow Up Transport: Family  Plan discussed with Pt/Family/Caregiver: Yes  Discharge Location  Discharge Placement: Home     AMY Patton

## 2017-08-15 NOTE — PROGRESS NOTES
Gastrointestinal Progress Note    8/15/2017    Admit Date: 8/12/2017    Subjective:     New Complaints Today:  Patient states that she feels weak from not moving much over the past few days but overall improving. Mild intermittent abdominal discomfort. BM this morning semiformed and no blood. Tolerating regular diet and denies nausea or vomiting. Current Facility-Administered Medications   Medication Dose Route Frequency    sodium chloride (NS) flush 5-10 mL  5-10 mL IntraVENous Q8H    sodium chloride (NS) flush 5-10 mL  5-10 mL IntraVENous PRN    levoFLOXacin (LEVAQUIN) 750 mg in D5W IVPB  750 mg IntraVENous Q24H    ondansetron (ZOFRAN) injection 4 mg  4 mg IntraVENous Q4H PRN    diphenhydrAMINE (BENADRYL) injection 12.5 mg  12.5 mg IntraVENous Q4H PRN    acetaminophen (TYLENOL) tablet 650 mg  650 mg Oral Q4H PRN    morphine injection 2 mg  2 mg IntraVENous Q4H PRN    metroNIDAZOLE (FLAGYL) IVPB premix 500 mg  500 mg IntraVENous Q8H    0.9% sodium chloride with KCl 40 mEq/L infusion   IntraVENous CONTINUOUS    Tuberculin PPD - please read at 24hrs, 48 hrs, and 72 hrs after placement  1 Each Other Q24H    sodium chloride (NS) flush 5-10 mL  5-10 mL IntraVENous Q8H    sodium chloride (NS) flush 5-10 mL  5-10 mL IntraVENous PRN        Objective:     Blood pressure 121/61, pulse 74, temperature 98.5 °F (36.9 °C), resp. rate 16, height 5' 6\" (1.676 m), weight 95.3 kg (210 lb), last menstrual period 07/12/2017, SpO2 97 %, not currently breastfeeding. 08/13 1901 - 08/15 0700  In: 2325 [P.O.:950; I.V.:1375]  Out: -     EXAM:  GENERAL: alert, cooperative, no distress, HEART: regular rate and rhythm, S1, S2 normal, no murmur, click, rub or gallop, LUNGS: chest clear, no wheezing, rales, normal symmetric air entry, ABDOMEN: bowel sounds present, soft, nondistended, minimal diffuse discomfort. No rebound or guarding.  EXTREMITY: extremities normal, atraumatic, no cyanosis or edema      Data Review Recent Results (from the past 24 hour(s))   CBC WITH AUTOMATED DIFF    Collection Time: 08/15/17  3:17 AM   Result Value Ref Range    WBC 7.1 3.6 - 11.0 K/uL    RBC 2.34 (L) 3.80 - 5.20 M/uL    HGB 7.3 (L) 11.5 - 16.0 g/dL    HCT 22.0 (L) 35.0 - 47.0 %    MCV 94.0 80.0 - 99.0 FL    MCH 31.2 26.0 - 34.0 PG    MCHC 33.2 30.0 - 36.5 g/dL    RDW 13.3 11.5 - 14.5 %    PLATELET 363 172 - 312 K/uL    NEUTROPHILS 62 32 - 75 %    LYMPHOCYTES 30 12 - 49 %    MONOCYTES 7 5 - 13 %    EOSINOPHILS 1 0 - 7 %    BASOPHILS 0 0 - 1 %    ABS. NEUTROPHILS 4.5 1.8 - 8.0 K/UL    ABS. LYMPHOCYTES 2.1 0.8 - 3.5 K/UL    ABS. MONOCYTES 0.5 0.0 - 1.0 K/UL    ABS. EOSINOPHILS 0.0 0.0 - 0.4 K/UL    ABS. BASOPHILS 0.0 0.0 - 0.1 K/UL       Assessment:     Active Problems:    Essential hypertension (1/4/2017)      Crohn's disease of both small and large intestine without complication ([facility] [address] (1/0/1854)      Diverticulitis (8/12/2017)      BRBPR (bright red blood per rectum) (8/13/2017)        Plan:     1. Continue with IV antibiotics for diverticulitis  2. Plan is for repeat colonoscopy to ensure resolution of diverticulitis prior to resuming biologic therapy  3.   Slight drop in hemoglobin but no further bleeding - repeat in AM     Gayatri Dave PA-C  08/15/17  9:47 AM  I have interviewed and examined patient with addendum to note above and formulation care plan    Sarthak Oreilly M.D.

## 2017-08-16 LAB
ANION GAP BLD CALC-SCNC: 4 MMOL/L (ref 5–15)
BUN SERPL-MCNC: 3 MG/DL (ref 6–20)
BUN/CREAT SERPL: 4 (ref 12–20)
CALCIUM SERPL-MCNC: 8 MG/DL (ref 8.5–10.1)
CHLORIDE SERPL-SCNC: 108 MMOL/L (ref 97–108)
CO2 SERPL-SCNC: 28 MMOL/L (ref 21–32)
CREAT SERPL-MCNC: 0.83 MG/DL (ref 0.55–1.02)
ERYTHROCYTE [DISTWIDTH] IN BLOOD BY AUTOMATED COUNT: 13.4 % (ref 11.5–14.5)
GLUCOSE SERPL-MCNC: 113 MG/DL (ref 65–100)
HCT VFR BLD AUTO: 23.7 % (ref 35–47)
HGB BLD-MCNC: 7.8 G/DL (ref 11.5–16)
MCH RBC QN AUTO: 31.1 PG (ref 26–34)
MCHC RBC AUTO-ENTMCNC: 32.9 G/DL (ref 30–36.5)
MCV RBC AUTO: 94.4 FL (ref 80–99)
PLATELET # BLD AUTO: 272 K/UL (ref 150–400)
POTASSIUM SERPL-SCNC: 3.7 MMOL/L (ref 3.5–5.1)
RBC # BLD AUTO: 2.51 M/UL (ref 3.8–5.2)
SODIUM SERPL-SCNC: 140 MMOL/L (ref 136–145)
WBC # BLD AUTO: 7.7 K/UL (ref 3.6–11)

## 2017-08-16 PROCEDURE — 85027 COMPLETE CBC AUTOMATED: CPT | Performed by: PHYSICIAN ASSISTANT

## 2017-08-16 PROCEDURE — 74011250636 HC RX REV CODE- 250/636: Performed by: INTERNAL MEDICINE

## 2017-08-16 PROCEDURE — 74011000250 HC RX REV CODE- 250: Performed by: INTERNAL MEDICINE

## 2017-08-16 PROCEDURE — 80048 BASIC METABOLIC PNL TOTAL CA: CPT | Performed by: INTERNAL MEDICINE

## 2017-08-16 PROCEDURE — 74011250636 HC RX REV CODE- 250/636: Performed by: PHYSICIAN ASSISTANT

## 2017-08-16 PROCEDURE — 65270000029 HC RM PRIVATE

## 2017-08-16 PROCEDURE — 74011250637 HC RX REV CODE- 250/637: Performed by: INTERNAL MEDICINE

## 2017-08-16 PROCEDURE — 36415 COLL VENOUS BLD VENIPUNCTURE: CPT | Performed by: PHYSICIAN ASSISTANT

## 2017-08-16 PROCEDURE — 80048 BASIC METABOLIC PNL TOTAL CA: CPT | Performed by: PHYSICIAN ASSISTANT

## 2017-08-16 PROCEDURE — 99218 HC RM OBSERVATION: CPT

## 2017-08-16 RX ORDER — MAGNESIUM CITRATE
296 SOLUTION, ORAL ORAL
Status: COMPLETED | OUTPATIENT
Start: 2017-08-16 | End: 2017-08-16

## 2017-08-16 RX ORDER — TRIAMCINOLONE ACETONIDE 40 MG/ML
40 INJECTION, SUSPENSION INTRA-ARTICULAR; INTRAMUSCULAR ONCE
Status: COMPLETED | OUTPATIENT
Start: 2017-08-16 | End: 2017-08-16

## 2017-08-16 RX ADMIN — Medication 10 ML: at 14:56

## 2017-08-16 RX ADMIN — LEVOFLOXACIN 750 MG: 5 INJECTION, SOLUTION INTRAVENOUS at 00:57

## 2017-08-16 RX ADMIN — METRONIDAZOLE 500 MG: 500 INJECTION, SOLUTION INTRAVENOUS at 21:57

## 2017-08-16 RX ADMIN — MAGESIUM CITRATE 296 ML: 1.75 LIQUID ORAL at 10:33

## 2017-08-16 RX ADMIN — METRONIDAZOLE 500 MG: 500 INJECTION, SOLUTION INTRAVENOUS at 05:39

## 2017-08-16 RX ADMIN — Medication 10 ML: at 22:02

## 2017-08-16 RX ADMIN — ACETAMINOPHEN 650 MG: 325 TABLET ORAL at 03:44

## 2017-08-16 RX ADMIN — TRIAMCINOLONE ACETONIDE 40 MG: 40 INJECTION, SUSPENSION INTRA-ARTICULAR; INTRAMUSCULAR at 12:50

## 2017-08-16 RX ADMIN — METRONIDAZOLE 500 MG: 500 INJECTION, SOLUTION INTRAVENOUS at 14:56

## 2017-08-16 RX ADMIN — ACETAMINOPHEN 650 MG: 325 TABLET ORAL at 08:38

## 2017-08-16 NOTE — PROGRESS NOTES
Gastrointestinal Progress Note    8/16/2017    Admit Date: 8/12/2017    Subjective:     New Complaints Today:  Early this AM patient was having lower abdominal pain. States that she was able to have a small bowel movement and then helped relieve some of the pain. No further bleeding. Denies nausea or vomiting. Tolerating regular diet. Does complain of some right knee pain and unable to bear weight on that knee. Ortho following and aspiration of fluid done yesterday. Current Facility-Administered Medications   Medication Dose Route Frequency    magnesium citrate solution 296 mL  296 mL Oral NOW    sodium chloride (NS) flush 5-10 mL  5-10 mL IntraVENous Q8H    sodium chloride (NS) flush 5-10 mL  5-10 mL IntraVENous PRN    levoFLOXacin (LEVAQUIN) 750 mg in D5W IVPB  750 mg IntraVENous Q24H    ondansetron (ZOFRAN) injection 4 mg  4 mg IntraVENous Q4H PRN    diphenhydrAMINE (BENADRYL) injection 12.5 mg  12.5 mg IntraVENous Q4H PRN    acetaminophen (TYLENOL) tablet 650 mg  650 mg Oral Q4H PRN    morphine injection 2 mg  2 mg IntraVENous Q4H PRN    metroNIDAZOLE (FLAGYL) IVPB premix 500 mg  500 mg IntraVENous Q8H    sodium chloride (NS) flush 5-10 mL  5-10 mL IntraVENous Q8H    sodium chloride (NS) flush 5-10 mL  5-10 mL IntraVENous PRN        Objective:     Blood pressure 120/82, pulse 84, temperature 98.2 °F (36.8 °C), resp. rate 18, height 5' 6\" (1.676 m), weight 95.3 kg (210 lb), last menstrual period 07/12/2017, SpO2 98 %, not currently breastfeeding. EXAM:  GENERAL: alert, cooperative, no distress, HEART: regular rate and rhythm, S1, S2 normal, no murmur, click, rub or gallop, LUNGS: chest clear, no wheezing, rales, normal symmetric air entry, ABDOMEN: bowel sounds present, soft, nondistended, mild lower abdominal discomfort. No rebound or guarding.  EXTREMITY: right knee swelling    Data Review    Recent Results (from the past 24 hour(s))   CELL COUNT, BODY FLUID    Collection Time: 08/15/17  4:20 PM   Result Value Ref Range    BODY FLUID TYPE SYNOVIAL FLUID      FLUID COLOR RED      FLUID APPEARANCE CLOUDY      FLUID RBC COUNT >100 /cu mm    FLUID WBC COUNT 274 (H) 0 - 5 /cu mm    FLD NEUTROPHILS 66 %    FLD LYMPHS 26 %    FLD MONO/MACROPHAGE 8 %   CULTURE, BODY FLUID W GRAM STAIN    Collection Time: 08/15/17  4:20 PM   Result Value Ref Range    Special Requests: NO SPECIAL REQUESTS      GRAM STAIN RARE WBCS SEEN      GRAM STAIN NO ORGANISMS SEEN      Culture result: PENDING    CRYSTALS, SYNOVIAL FLUID    Collection Time: 08/15/17  4:20 PM   Result Value Ref Range    FLUID TYPE(7) SYNOVIAL FLUID      Crystals, body fluid NEGATIVE      CBC W/O DIFF    Collection Time: 08/16/17  3:05 AM   Result Value Ref Range    WBC 7.7 3.6 - 11.0 K/uL    RBC 2.51 (L) 3.80 - 5.20 M/uL    HGB 7.8 (L) 11.5 - 16.0 g/dL    HCT 23.7 (L) 35.0 - 47.0 %    MCV 94.4 80.0 - 99.0 FL    MCH 31.1 26.0 - 34.0 PG    MCHC 32.9 30.0 - 36.5 g/dL    RDW 13.4 11.5 - 14.5 %    PLATELET 440 458 - 009 K/uL   METABOLIC PANEL, BASIC    Collection Time: 08/16/17  3:05 AM   Result Value Ref Range    Sodium 140 136 - 145 mmol/L    Potassium 3.7 3.5 - 5.1 mmol/L    Chloride 108 97 - 108 mmol/L    CO2 28 21 - 32 mmol/L    Anion gap 4 (L) 5 - 15 mmol/L    Glucose 113 (H) 65 - 100 mg/dL    BUN 3 (L) 6 - 20 MG/DL    Creatinine 0.83 0.55 - 1.02 MG/DL    BUN/Creatinine ratio 4 (L) 12 - 20      GFR est AA >60 >60 ml/min/1.73m2    GFR est non-AA >60 >60 ml/min/1.73m2    Calcium 8.0 (L) 8.5 - 10.1 MG/DL       Assessment:     Active Problems:    Essential hypertension (1/4/2017)      Crohn's disease of both small and large intestine without complication ([address] (0/7/5872)      Diverticulitis (8/12/2017)      BRBPR (bright red blood per rectum) (8/13/2017)        Plan:     1. Continue with IV antibiotics for diverticulitis  2. Plan is for repeat colonoscopy to ensure resolution of diverticulitis prior to resuming biologic therapy  3.   Mag citrate added to help with constipation  4. Right knee pain - defer to ortho    Roseann Anne PA-C  08/16/17  9:37 AM    Reviewed with Dr. Law Carnes and if patient has bowel movements without blood she is stable for discharge. With plan as mentioned above - complete course of PO antibiotics, colonoscopy to follow to assess for resolution of diverticulitis.     Roseann Anne PA-C  08/16/17  3:35 PM    I have interviewed and examined patient with addendum to note above and formulation care plan    Mele Camacho M.D.

## 2017-08-16 NOTE — PROGRESS NOTES
Bedside and Verbal shift change report given to Ciara Galdamez RN (oncoming nurse) by Juhi Ribeiro RN (offgoing nurse). Report included the following information SBAR, Kardex, Intake/Output, MAR and Recent Results.

## 2017-08-16 NOTE — PROGRESS NOTES
Sravan Loredo hafsa [place] [address]  [address] [facility], [address] [place] [address]  [phone]      Medical Progress Note      NAME: Deep Alejandro   :  1965  MRM:  [id number]    Date/Time: 2017  11:19 AM       Assessment and Plan:   1. Acute Diverticulitis (2017): CT scan abdomen showed a segment of diverticulitis without evidence of perforation or drainable abscess at the junction of the descending colon and sigmoid colon. Continue empiric IV Levofloxacin and Metronidazole. S/p colonoscopy on  which revealed: Active colonic crohn's disease with colonic diverticulitis superimposed. Plan is to repeat colonoscopy when completing antibiotics to be certain resolution diverticulitis prior to biologic therapy     2. Acute blood loss anemia/ BRBPR (bright red blood per rectum) (2017): likely secondary to colonic ulceration. Hgb has continued to drop but bleeding much less. Continue to monitor H/H     3. Crohn's disease of both small and large intestine without complication ([facility] [address] [facility] (8712): plan as above.       4.  Essential hypertension (2017): On Losartan       5. Hx Uterine fibroids POA:  outpatient follow up    6. RT knee pain and swelling/ mild OA with effusion. x ray of the knee showed effusion which was taped. Doesn't look like infected. Plan id for steroid injection per orthopedics. Appreciated orthopedics evaluation. 7.  Obesity. Would benefit from weight loss.                Subjective:     Chief Complaint:  Follow up of pt who was admitted with hematochezia. Denies bleeding. C/o RT knee pain. ROS:  (bold if positive, if negative)      Tolerating PT  Tolerating Diet        Objective:     Last 24hrs VS reviewed since prior progress note.  Most recent are:    Visit Vitals    /82 (BP 1 Location: Right arm, BP Patient Position: At rest)    Pulse 84    Temp 98.2 °F (36.8 °C)    Resp 18    Ht 5' 6\" (1.676 m)    Wt 95.3 kg (210 lb)    SpO2 98%    Breastfeeding No    BMI 33.89 kg/m2     SpO2 Readings from Last 6 Encounters:   08/16/17 98%   08/11/17 98%   08/03/17 98%   01/04/17 96%    O2 Flow Rate (L/min): 2 l/min   No intake or output data in the 24 hours ending 08/16/17 1037     Physical Exam:    Gen:  Well-developed, well-nourished, in no acute distress  HEENT:  Pink conjunctivae, PERRL, hearing intact to voice, moist mucous membranes  Neck:  Supple, without masses, thyroid non-tender  Resp:  No accessory muscle use, clear breath sounds without wheezes rales or rhonchi  Card:  No murmurs, normal S1, S2 without thrills, bruits or peripheral edema  Abd:  Soft, non-tender, non-distended, normoactive bowel sounds are present, no palpable organomegaly and no detectable hernias  Lymph:  No cervical or inguinal adenopathy  Musc:  No cyanosis or clubbing  Skin:  No rashes or ulcers, skin turgor is good  Neuro:  Cranial nerves are grossly intact, no focal motor weakness, follows commands appropriately  Psych:  Good insight, oriented to person, place and time, alert  __________________________________________________________________  Medications Reviewed: (see below)  Medications:     Current Facility-Administered Medications   Medication Dose Route Frequency    sodium chloride (NS) flush 5-10 mL  5-10 mL IntraVENous Q8H    sodium chloride (NS) flush 5-10 mL  5-10 mL IntraVENous PRN    levoFLOXacin (LEVAQUIN) 750 mg in D5W IVPB  750 mg IntraVENous Q24H    ondansetron (ZOFRAN) injection 4 mg  4 mg IntraVENous Q4H PRN    diphenhydrAMINE (BENADRYL) injection 12.5 mg  12.5 mg IntraVENous Q4H PRN    acetaminophen (TYLENOL) tablet 650 mg  650 mg Oral Q4H PRN    morphine injection 2 mg  2 mg IntraVENous Q4H PRN    metroNIDAZOLE (FLAGYL) IVPB premix 500 mg  500 mg IntraVENous Q8H    sodium chloride (NS) flush 5-10 mL  5-10 mL IntraVENous Q8H    sodium chloride (NS) flush 5-10 mL  5-10 mL IntraVENous PRN        Lab Data Reviewed: (see below)  Lab Review: Recent Labs      08/16/17   0305  08/15/17   0317  08/14/17   0452   WBC  7.7  7.1   --    HGB  7.8*  7.3*  7.7*   HCT  23.7*  22.0*  23.2*   PLT  272  247   --      Recent Labs      08/16/17   0305   NA  140   K  3.7   CL  108   CO2  28   GLU  113*   BUN  3*   CREA  0.83   CA  8.0*     No results found for: GLUCPOC  No results for input(s): PH, PCO2, PO2, HCO3, FIO2 in the last 72 hours. No results for input(s): INR in the last 72 hours. No lab exists for component: INREXT, INREXT  All Micro Results     Procedure Component Value Units Date/Time    CULTURE, BODY FLUID Lulu Jose STAIN [[id number]] Collected:  08/15/17 1620    Order Status:  Completed Specimen:  Synovial Fluid Updated:  08/16/17 1006     Special Requests: NO SPECIAL REQUESTS        GRAM STAIN RARE WBCS SEEN         NO ORGANISMS SEEN        Culture result: NO GROWTH AFTER 15 HOURS       CULTURE, STOOL [[id number]] Collected:  08/13/17 1407    Order Status:  Completed Specimen:  Stool Updated:  08/15/17 0958     Special Requests: NO SPECIAL REQUESTS        Campylobacter antigen NEGATIVE        Shiga toxin-producing E. coli Ag NEGATIVE        Culture result:         NO ROUTINE ENTERIC PATHOGENS ISOLATED INCLUDING SALMONELLA, SHIGELLA, YERSINIA, VIBRIO OR SHIGA TOXIN PRODUCING E. COLI      NO COLIFORMS ISOLATED         SCANT  GRAM POSITIVE DYLON ISOLATED       C. DIFFICILE (DNA) [[id number]] Collected:  08/13/17 1407    Order Status:  Completed Specimen:  Stool Updated:  08/14/17 1525     C. difficile (DNA) NEGATIVE          This specimen is negative for toxigenic C difficile by DNA amplification. Repeat testing is not recommended for confirmation, samples received within 7 days of this negative result will be rejected. C. DIFFICILE (DNA) [[id number]] Collected:  08/13/17 0945    Order Status:  Canceled           I have reviewed notes of prior 24hr. Other pertinent lab:       Total time spent with patient: Kenneth 59 discussed with: Patient, Nursing Staff and >50% of time spent in counseling and coordination of care    Discussed:  Care Plan    Prophylaxis:  SCD's    Disposition:  Home w/Family           ___________________________________________________    Attending Physician: Johnathan Bowling MD

## 2017-08-16 NOTE — PROGRESS NOTES
Bedside and Verbal shift change report given to Ciara Galdamez RN  (oncoming nurse) by Dino Haynes RN (offgoing nurse). Report included the following information SBAR, Kardex, Intake/Output, MAR and Recent Results.

## 2017-08-17 VITALS
BODY MASS INDEX: 33.75 KG/M2 | HEART RATE: 70 BPM | OXYGEN SATURATION: 93 % | TEMPERATURE: 97.8 F | HEIGHT: 66 IN | RESPIRATION RATE: 16 BRPM | DIASTOLIC BLOOD PRESSURE: 64 MMHG | SYSTOLIC BLOOD PRESSURE: 106 MMHG | WEIGHT: 210 LBS

## 2017-08-17 LAB
ANION GAP SERPL CALC-SCNC: 4 MMOL/L (ref 5–15)
BASOPHILS # BLD: 0 K/UL (ref 0–0.1)
BASOPHILS NFR BLD: 0 % (ref 0–1)
BUN SERPL-MCNC: 4 MG/DL (ref 6–20)
BUN/CREAT SERPL: 6 (ref 12–20)
CALCIUM SERPL-MCNC: 8.5 MG/DL (ref 8.5–10.1)
CHLORIDE SERPL-SCNC: 106 MMOL/L (ref 97–108)
CO2 SERPL-SCNC: 28 MMOL/L (ref 21–32)
CREAT SERPL-MCNC: 0.72 MG/DL (ref 0.55–1.02)
EOSINOPHIL # BLD: 0 K/UL (ref 0–0.4)
EOSINOPHIL NFR BLD: 0 % (ref 0–7)
ERYTHROCYTE [DISTWIDTH] IN BLOOD BY AUTOMATED COUNT: 13.7 % (ref 11.5–14.5)
GLUCOSE SERPL-MCNC: 166 MG/DL (ref 65–100)
HCT VFR BLD AUTO: 25.2 % (ref 35–47)
HGB BLD-MCNC: 8.2 G/DL (ref 11.5–16)
LYMPHOCYTES # BLD: 0.5 K/UL (ref 0.8–3.5)
LYMPHOCYTES NFR BLD: 6 % (ref 12–49)
MCH RBC QN AUTO: 30.8 PG (ref 26–34)
MCHC RBC AUTO-ENTMCNC: 32.5 G/DL (ref 30–36.5)
MCV RBC AUTO: 94.7 FL (ref 80–99)
MONOCYTES # BLD: 0.3 K/UL (ref 0–1)
MONOCYTES NFR BLD: 4 % (ref 5–13)
NEUTS SEG # BLD: 6.8 K/UL (ref 1.8–8)
NEUTS SEG NFR BLD: 90 % (ref 32–75)
PLATELET # BLD AUTO: 307 K/UL (ref 150–400)
POTASSIUM SERPL-SCNC: 4 MMOL/L (ref 3.5–5.1)
RBC # BLD AUTO: 2.66 M/UL (ref 3.8–5.2)
SODIUM SERPL-SCNC: 138 MMOL/L (ref 136–145)
WBC # BLD AUTO: 7.6 K/UL (ref 3.6–11)

## 2017-08-17 PROCEDURE — 74011000250 HC RX REV CODE- 250: Performed by: INTERNAL MEDICINE

## 2017-08-17 PROCEDURE — 99218 HC RM OBSERVATION: CPT

## 2017-08-17 PROCEDURE — 36415 COLL VENOUS BLD VENIPUNCTURE: CPT | Performed by: INTERNAL MEDICINE

## 2017-08-17 PROCEDURE — 85025 COMPLETE CBC W/AUTO DIFF WBC: CPT | Performed by: INTERNAL MEDICINE

## 2017-08-17 PROCEDURE — 74011250636 HC RX REV CODE- 250/636: Performed by: INTERNAL MEDICINE

## 2017-08-17 RX ORDER — CIPROFLOXACIN 500 MG/1
500 TABLET ORAL 2 TIMES DAILY
Qty: 28 TAB | Refills: 0 | Status: SHIPPED | OUTPATIENT
Start: 2017-08-17 | End: 2017-08-30

## 2017-08-17 RX ORDER — METRONIDAZOLE 500 MG/1
500 TABLET ORAL 3 TIMES DAILY
Qty: 45 TAB | Refills: 0 | Status: SHIPPED | OUTPATIENT
Start: 2017-08-17 | End: 2017-08-19

## 2017-08-17 RX ADMIN — Medication 10 ML: at 05:51

## 2017-08-17 RX ADMIN — METRONIDAZOLE 500 MG: 500 INJECTION, SOLUTION INTRAVENOUS at 05:50

## 2017-08-17 RX ADMIN — LEVOFLOXACIN 750 MG: 5 INJECTION, SOLUTION INTRAVENOUS at 00:50

## 2017-08-17 NOTE — PROGRESS NOTES
Gastrointestinal Progress Note    8/17/2017    Admit Date: 8/12/2017    Subjective:  Feels much better     New Complaints Today:  No: little if any pain. No bleeding seen over 48 hours. No vomiting        Current Facility-Administered Medications   Medication Dose Route Frequency    sodium chloride (NS) flush 5-10 mL  5-10 mL IntraVENous Q8H    sodium chloride (NS) flush 5-10 mL  5-10 mL IntraVENous PRN    levoFLOXacin (LEVAQUIN) 750 mg in D5W IVPB  750 mg IntraVENous Q24H    ondansetron (ZOFRAN) injection 4 mg  4 mg IntraVENous Q4H PRN    diphenhydrAMINE (BENADRYL) injection 12.5 mg  12.5 mg IntraVENous Q4H PRN    acetaminophen (TYLENOL) tablet 650 mg  650 mg Oral Q4H PRN    morphine injection 2 mg  2 mg IntraVENous Q4H PRN    metroNIDAZOLE (FLAGYL) IVPB premix 500 mg  500 mg IntraVENous Q8H    sodium chloride (NS) flush 5-10 mL  5-10 mL IntraVENous Q8H    sodium chloride (NS) flush 5-10 mL  5-10 mL IntraVENous PRN        Objective:     Blood pressure 124/74, pulse 67, temperature 98.3 °F (36.8 °C), resp. rate 15, height 5' 6\" (1.676 m), weight 95.3 kg (210 lb), last menstrual period 07/12/2017, SpO2 96 %, not currently breastfeeding.               EXAM:  GENERAL: alert, cooperative, no distress, HEART: regular rate and rhythm, LUNGS: chest clear, no wheezing, rales, normal symmetric air entry, ABDOMEN:  Bowel sounds are normal, liver is not enlarged, spleen is not enlarged and EXTREMITY: no edema      Data Review    Recent Results (from the past 24 hour(s))   CBC WITH AUTOMATED DIFF    Collection Time: 08/17/17  3:05 AM   Result Value Ref Range    WBC 7.6 3.6 - 11.0 K/uL    RBC 2.66 (L) 3.80 - 5.20 M/uL    HGB 8.2 (L) 11.5 - 16.0 g/dL    HCT 25.2 (L) 35.0 - 47.0 %    MCV 94.7 80.0 - 99.0 FL    MCH 30.8 26.0 - 34.0 PG    MCHC 32.5 30.0 - 36.5 g/dL    RDW 13.7 11.5 - 14.5 %    PLATELET 600 172 - 482 K/uL    NEUTROPHILS 90 (H) 32 - 75 %    LYMPHOCYTES 6 (L) 12 - 49 %    MONOCYTES 4 (L) 5 - 13 % EOSINOPHILS 0 0 - 7 %    BASOPHILS 0 0 - 1 %    ABS. NEUTROPHILS 6.8 1.8 - 8.0 K/UL    ABS. LYMPHOCYTES 0.5 (L) 0.8 - 3.5 K/UL    ABS. MONOCYTES 0.3 0.0 - 1.0 K/UL    ABS. EOSINOPHILS 0.0 0.0 - 0.4 K/UL    ABS. BASOPHILS 0.0 0.0 - 0.1 K/UL       Assessment:     Active Problems:    Essential hypertension (1/4/2017)      Crohn's disease of both small and large intestine without complication ([facility] [address] (2/9/4898)      Diverticulitis (8/12/2017)      BRBPR (bright red blood per rectum) (8/13/2017)    Appears colonic diverticulitis was main factor responsible for blood loss anemia in spite of her having active Crohn's disease    Plan:     1. From my point of view can discharge  2. Specifically advised full 20 days oral antibiotics from this point going forward. She understands absolute need eliminate infection before consideration resuming anti TNF such as Remicade  3. Specifically advised OTC daily 325 iron, 7009 Y02 and 095 folic acid  4. No immunosuppression until seen office shortly after completion antibiotics  5.   She understands call sooner if problems occur    Thanks again

## 2017-08-17 NOTE — ROUTINE PROCESS
Bedside and Verbal shift change report given to Farideh Black (oncoming nurse) by LAMAR Stinson (offgoing nurse). Report included the following information SBAR, Kardex, Procedure Summary, Intake/Output, MAR, Accordion, Recent Results and Med Rec Status.

## 2017-08-17 NOTE — DISCHARGE SUMMARY
Hospitalist Discharge Summary     Patient ID:    Oskar Pedraza  [id number]  50 y.o.  1965    Admit date: 8/12/2017    Discharge date and time: 8/17/2017    Admission Diagnoses: Diverticulitis  anemia    Chronic Diagnoses:    Problem List as of 8/17/2017  Date Reviewed: 8/17/2017          Codes Class Noted - Resolved    BRBPR (bright red blood per rectum) ICD-10-CM: K62.5  ICD-9-CM: 569.3  8/13/2017 - Present        Diverticulitis ICD-10-CM: K57.92  ICD-9-CM: 562.11  8/12/2017 - Present        Uterine leiomyoma 2010, 2015  (Chronic) ICD-10-CM: D25.9  ICD-9-CM: 218.9  8/3/2017 - Present        Crohn's colitis ([facility] fissure 2007 (Chronic) ICD-10-CM: K50.10  ICD-9-CM: 555.1  8/3/2017 - Present        Essential hypertension ICD-10-CM: I10  ICD-9-CM: 401.9  1/4/2017 - Present        Crohn's disease of both small and large intestine without complication ([facility] MBX-60-UI: K50.80  ICD-9-CM: 555.2  1/4/2017 - Present              Discharge Medications:   Current Discharge Medication List      CONTINUE these medications which have CHANGED    Details   ciprofloxacin HCl (CIPRO) 500 mg tablet Take 1 Tab by mouth two (2) times a day for 6 days. 7 day course 8/11-8/17  Qty: 28 Tab, Refills: 0      metroNIDAZOLE (FLAGYL) 500 mg tablet Take 1 Tab by mouth three (3) times daily for 6 days. Qty: 45 Tab, Refills: 0         CONTINUE these medications which have NOT CHANGED    Details   losartan (COZAAR) 25 mg tablet TAKE 1 TAB BY MOUTH DAILY FOR 90 DAYS. Refills: 1         STOP taking these medications       mesalamine (LIALDA) 1.2 gram delayed release tablet Comments:   Reason for Stopping:         predniSONE (DELTASONE) 5 mg tablet Comments:   Reason for Stopping: Follow up Care:    1. Ayo Farias MD in 1-2 weeks  2. GI  3. ortho    Diet:  Regular Diet    Disposition:  Home. Advanced Directive:    Discharge Exam:  See today's note.     CONSULTATIONS: GI and Orthopedic Surgery    Significant Diagnostic Studies: Recent Labs      08/17/17   0305  08/16/17   0305   WBC  7.6  7.7   HGB  8.2*  7.8*   HCT  25.2*  23.7*   PLT  307  272     Recent Labs      08/16/17   0319  08/16/17   0305   NA  138  140   K  4.0  3.7   CL  106  108   CO2  28  28   BUN  4*  3*   CREA  0.72  0.83   GLU  166*  113*   CA  8.5  8.0*     No results for input(s): SGOT, GPT, ALT, AP, TBIL, TBILI, TP, ALB, GLOB, GGT, AML, LPSE in the last 72 hours. No lab exists for component: AMYP, HLPSE  No results for input(s): INR, PTP, APTT in the last 72 hours. No lab exists for component: INREXT   No results for input(s): FE, TIBC, PSAT, FERR in the last 72 hours. No results for input(s): PH, PCO2, PO2 in the last 72 hours. No results for input(s): CPK, CKMB in the last 72 hours. No lab exists for component: TROPONINI  No results found for: [place] [address]:   1. Acute Diverticulitis (8/12/2017): CT scan abdomen showed a segment of diverticulitis without evidence of perforation or drainable abscess at the junction of the descending colon and sigmoid colon. Continue empiric IV Levofloxacin and Metronidazole. S/p colonoscopy on 8/14 which revealed: Active colonic crohn's disease with colonic diverticulitis superimposed. Plan is to repeat colonoscopy when completing antibiotics to be certain resolution diverticulitis prior to biologic therapy. continue ABx for 20 more days.       2. Acute blood loss anemia/ BRBPR (bright red blood per rectum) (8/13/2017): likely secondary to colonic ulceration. Hgb has continued to improve.     3. Crohn's disease of both small and large intestine without complication ([facility] [address] (2/0/4063): plan as above.        4. Essential hypertension (1/4/2017): On Losartan        5. Hx Uterine fibroids POA:  outpatient follow up     6. RT knee pain and swelling/ mild OA with effusion. x ray of the knee showed effusion which was taped. Doesn't look like infected. S/p cortisone injection.  Pt feels better. Appreciated orthopedics evaluation. Needs outpatient FU      7. Obesity. Would benefit from weight loss.     Pt is discharged in improved condition       Signed:  Naomi Quiñones MD  8/17/2017  9:43 AM

## 2017-08-17 NOTE — DISCHARGE INSTRUCTIONS
ACUTE DIAGNOSES:  Diverticulitis  anemia    CHRONIC MEDICAL DIAGNOSES:  Problem List as of 8/17/2017  Date Reviewed: 8/17/2017          Codes Class Noted - Resolved    BRBPR (bright red blood per rectum) ICD-10-CM: K62.5  ICD-9-CM: 569.3  8/13/2017 - Present        Diverticulitis ICD-10-CM: K57.92  ICD-9-CM: 562.11  8/12/2017 - Present        Uterine leiomyoma 2010, 2015  (Chronic) ICD-10-CM: D25.9  ICD-9-CM: 218.9  8/3/2017 - Present        Crohn's colitis ([facility] [address] fissure 2007 (Chronic) ICD-10-CM: K50.10  ICD-9-CM: 555.1  8/3/2017 - Present        Essential hypertension ICD-10-CM: I10  ICD-9-CM: 401.9  1/4/2017 - Present        Crohn's disease of both small and large intestine without complication ([facility] IDR-59-FD: K50.80  ICD-9-CM: 555.2  1/4/2017 - Present              DISCHARGE MEDICATIONS:          · It is important that you take the medication exactly as they are prescribed. · Keep your medication in the bottles provided by the pharmacist and keep a list of the medication names, dosages, and times to be taken in your wallet. · Do not take other medications without consulting your doctor. DIET:  Regular Diet    ACTIVITY: {discharge activity:19752}    ADDITIONAL INFORMATION: If you experience any of the following symptoms then please call your primary care physician or return to the emergency room if you cannot get hold of your doctor: Fever, chills, nausea, vomiting, diarrhea, change in mentation, falling, bleeding, shortness of breath. FOLLOW UP CARE:  Dr. Raphael Mendosa MD  you are to call and set up an appointment to see them in 2 weeks. Follow-up with Dr Law Carnes in 3 weeks    Follow-up with Dr Reji Subramanian, orthopedics in 2 weeks or early if symptoms worsen. Information obtained by :  I understand that if any problems occur once I am at home I am to contact my physician. I understand and acknowledge receipt of the instructions indicated above. Physician's or R.N.'s Signature                                                                  Date/Time                                                                                                                                              Patient or Representative Signature                                                          Date/Time

## 2017-08-17 NOTE — PROGRESS NOTES
Sravan Champagneelsen [facility]  [address] [facility], [address] [place] [address]  [phone]      Medical Progress Note      NAME: Asmita Hong   :  1965  MRM:  [id number]    Date/Time: 2017  11:19 AM       Assessment and Plan:   1. Acute Diverticulitis (2017): CT scan abdomen showed a segment of diverticulitis without evidence of perforation or drainable abscess at the junction of the descending colon and sigmoid colon. Continue empiric IV Levofloxacin and Metronidazole. S/p colonoscopy on  which revealed: Active colonic crohn's disease with colonic diverticulitis superimposed. Plan is to repeat colonoscopy when completing antibiotics to be certain resolution diverticulitis prior to biologic therapy. continue ABx for 20 days.      2. Acute blood loss anemia/ BRBPR (bright red blood per rectum) (2017): likely secondary to colonic ulceration. Hgb has continued to improve.     3. Crohn's disease of both small and large intestine without complication ([facility] [address] (1833): plan as above.       4.  Essential hypertension (2017): On Losartan       5. Hx Uterine fibroids POA:  outpatient follow up    6. RT knee pain and swelling/ mild OA with effusion. x ray of the knee showed effusion which was taped. Doesn't look like infected. S/p cortisone injection. Pt feels better. Appreciated orthopedics evaluation. Needs outpatient FU     7.  Obesity. Would benefit from weight loss.                Subjective:     Chief Complaint:  Follow up of pt who was admitted with hematochezia. Denies bleeding. RT knee pain is better    ROS:  (bold if positive, if negative)      Tolerating PT  Tolerating Diet        Objective:     Last 24hrs VS reviewed since prior progress note.  Most recent are:    Visit Vitals    /64 (BP 1 Location: Right arm, BP Patient Position: At rest)    Pulse 70    Temp 97.8 °F (36.6 °C)    Resp 16    Ht 5' 6\" (1.676 m)    Wt 95.3 kg (210 lb)    SpO2 93%    Breastfeeding No    BMI 33.89 kg/m2     SpO2 Readings from Last 6 Encounters:   08/17/17 93%   08/11/17 98%   08/03/17 98%   01/04/17 96%    O2 Flow Rate (L/min): 2 l/min   No intake or output data in the 24 hours ending 08/17/17 0932     Physical Exam:    Gen:  Well-developed, well-nourished, in no acute distress  HEENT:  Pink conjunctivae, PERRL, hearing intact to voice, moist mucous membranes  Neck:  Supple, without masses, thyroid non-tender  Resp:  No accessory muscle use, clear breath sounds without wheezes rales or rhonchi  Card:  No murmurs, normal S1, S2 without thrills, bruits or peripheral edema  Abd:  Soft, non-tender, non-distended, normoactive bowel sounds are present, no palpable organomegaly and no detectable hernias  Lymph:  No cervical or inguinal adenopathy  Musc:  No cyanosis or clubbing  Skin:  No rashes or ulcers, skin turgor is good  Neuro:  Cranial nerves are grossly intact, no focal motor weakness, follows commands appropriately  Psych:  Good insight, oriented to person, place and time, alert  __________________________________________________________________  Medications Reviewed: (see below)  Medications:     Current Facility-Administered Medications   Medication Dose Route Frequency    sodium chloride (NS) flush 5-10 mL  5-10 mL IntraVENous Q8H    sodium chloride (NS) flush 5-10 mL  5-10 mL IntraVENous PRN    levoFLOXacin (LEVAQUIN) 750 mg in D5W IVPB  750 mg IntraVENous Q24H    ondansetron (ZOFRAN) injection 4 mg  4 mg IntraVENous Q4H PRN    diphenhydrAMINE (BENADRYL) injection 12.5 mg  12.5 mg IntraVENous Q4H PRN    acetaminophen (TYLENOL) tablet 650 mg  650 mg Oral Q4H PRN    morphine injection 2 mg  2 mg IntraVENous Q4H PRN    metroNIDAZOLE (FLAGYL) IVPB premix 500 mg  500 mg IntraVENous Q8H    sodium chloride (NS) flush 5-10 mL  5-10 mL IntraVENous Q8H    sodium chloride (NS) flush 5-10 mL  5-10 mL IntraVENous PRN        Lab Data Reviewed: (see below)  Lab Review: Recent Labs      08/17/17   0305  08/16/17   0305  08/15/17   0317   WBC  7.6  7.7  7.1   HGB  8.2*  7.8*  7.3*   HCT  25.2*  23.7*  22.0*   PLT  307  272  247     Recent Labs      08/16/17   0319  08/16/17   0305   NA  138  140   K  4.0  3.7   CL  106  108   CO2  28  28   GLU  166*  113*   BUN  4*  3*   CREA  0.72  0.83   CA  8.5  8.0*     No results found for: GLUCPOC  No results for input(s): PH, PCO2, PO2, HCO3, FIO2 in the last 72 hours. No results for input(s): INR in the last 72 hours. No lab exists for component: INREXT, INREXT  All Micro Results     Procedure Component Value Units Date/Time    CULTURE, BODY FLUID Tevin Dew STAIN [[id number]] Collected:  08/15/17 1620    Order Status:  Completed Specimen:  Synovial Fluid Updated:  08/16/17 1006     Special Requests: NO SPECIAL REQUESTS        GRAM STAIN RARE WBCS SEEN         NO ORGANISMS SEEN        Culture result: NO GROWTH AFTER 15 HOURS       CULTURE, STOOL [[id number]] Collected:  08/13/17 1407    Order Status:  Completed Specimen:  Stool Updated:  08/15/17 0958     Special Requests: NO SPECIAL REQUESTS        Campylobacter antigen NEGATIVE        Shiga toxin-producing E. coli Ag NEGATIVE        Culture result:         NO ROUTINE ENTERIC PATHOGENS ISOLATED INCLUDING SALMONELLA, SHIGELLA, YERSINIA, VIBRIO OR SHIGA TOXIN PRODUCING E. COLI      NO COLIFORMS ISOLATED         SCANT  GRAM POSITIVE DYLON ISOLATED       C. DIFFICILE (DNA) [[id number]] Collected:  08/13/17 1407    Order Status:  Completed Specimen:  Stool Updated:  08/14/17 1525     C. difficile (DNA) NEGATIVE          This specimen is negative for toxigenic C difficile by DNA amplification. Repeat testing is not recommended for confirmation, samples received within 7 days of this negative result will be rejected. C. DIFFICILE (DNA) [[id number]] Collected:  08/13/17 0945    Order Status:  Canceled           I have reviewed notes of prior 24hr. Other pertinent lab:       Total time spent with patient: Kenneth 59 discussed with: Patient, Nursing Staff and >50% of time spent in counseling and coordination of care    Discussed:  Care Plan    Prophylaxis:  SCD's    Disposition:  Home w/Family           ___________________________________________________    Attending Physician: Deedee Flood MD

## 2017-08-18 ENCOUNTER — PATIENT OUTREACH (OUTPATIENT)
Dept: INTERNAL MEDICINE CLINIC | Age: 52
End: 2017-08-18

## 2017-08-18 LAB
INTERPRETATION:, 510752: NORMAL
REF LAB TEST METHOD: NORMAL
TPMT RBC-CCNC: 23.4 UNITS/ML RBC

## 2017-08-18 NOTE — PROGRESS NOTES
I have reviewed discharge instructions with the patient. The patient verbalized understanding. Opportunities to ask questions provided. Patient discharged to home. Patient left the unit via wheelchair accompanied by a [facility] volunteer.

## 2017-08-18 NOTE — PROGRESS NOTES
[address] Discharge MARK MELGAR Follow-Up    Patient listed on 8/17/16  [facility]INHEATH [facility]) Discharge Report. Patient hospitalized @ [address] [place] [address] 8/12-8/17/17. RRAT score: 3 Low risk for readmission    Diagnosis:  Diverticulitis/ anemia      Procedure: Colonoscopy  CT of abdomen/pelvis- IMPRESSION: Findings are most compatible with a segment of diverticulitis  without evidence of perforation or drainable abscess at the junction of the  descending colon and sigmoid colon. This should be followed to exclude other  etiologies. This would be atypical for Crohn's disease since the remainder of  the bowel is unremarkable. There is no evidence of GI blood loss. .       Discharge Instructions/Plans: Follow up with PCP 1-2 weeks  GI follow up   Ortho follow up for R Knee effusion. Regular diet   Weigt loss program.  Continue PO ABX regimen x 20 days  Cipro and Flagtt      NN post hospital interactive contact done by telephone within 2 business days of discharge     8/18/17- pt outreach (call). no able to reach the patient and left a message requesting a return call. NN's contact infor provided for return call. ACP - NO AMD scanned into EMR. Once able to meet with the patient will invite for Honoring Choices Advance care planning. Barriers-none identified @ this time     PCP f/u -   NN contact # given to call as needed.   NO follow up appt noted in our system    WIll follow. Goals      complete PO ABX courses             8/18/17- Patient discharged on CIpro and Flagyl, need to verify that this is taken on a regular schedule. LN        Establish PCP relationships and regularly scheduled appointments. 8/18/17- Verify that patient attends follow up with PCP .  LN

## 2017-08-19 ENCOUNTER — HOSPITAL ENCOUNTER (INPATIENT)
Age: 52
LOS: 9 days | Discharge: NURSING, REHAB OR LONG-TERM CARE | DRG: 488 | End: 2017-08-30
Attending: EMERGENCY MEDICINE | Admitting: ORTHOPAEDIC SURGERY
Payer: COMMERCIAL

## 2017-08-19 ENCOUNTER — APPOINTMENT (OUTPATIENT)
Dept: GENERAL RADIOLOGY | Age: 52
DRG: 488 | End: 2017-08-19
Attending: SURGERY
Payer: COMMERCIAL

## 2017-08-19 ENCOUNTER — APPOINTMENT (OUTPATIENT)
Dept: GENERAL RADIOLOGY | Age: 52
DRG: 488 | End: 2017-08-19
Attending: NURSE PRACTITIONER
Payer: COMMERCIAL

## 2017-08-19 DIAGNOSIS — K50.80 CROHN'S DISEASE OF BOTH SMALL AND LARGE INTESTINE WITHOUT COMPLICATION (HCC): Chronic | ICD-10-CM

## 2017-08-19 DIAGNOSIS — R53.81 DEBILITATED PATIENT: Chronic | ICD-10-CM

## 2017-08-19 DIAGNOSIS — M25.562 ACUTE PAIN OF LEFT KNEE: Primary | ICD-10-CM

## 2017-08-19 PROBLEM — N39.0 UTI (URINARY TRACT INFECTION): Status: ACTIVE | Noted: 2017-08-19

## 2017-08-19 PROBLEM — K57.92 DIVERTICULITIS: Status: RESOLVED | Noted: 2017-08-12 | Resolved: 2017-08-19

## 2017-08-19 PROBLEM — K62.5 BRBPR (BRIGHT RED BLOOD PER RECTUM): Status: RESOLVED | Noted: 2017-08-13 | Resolved: 2017-08-19

## 2017-08-19 PROBLEM — E66.9 OBESE: Status: ACTIVE | Noted: 2017-08-19

## 2017-08-19 PROBLEM — D72.829 LEUKOCYTOSIS: Status: ACTIVE | Noted: 2017-08-19

## 2017-08-19 PROBLEM — D64.9 ANEMIA: Status: ACTIVE | Noted: 2017-08-19

## 2017-08-19 PROBLEM — K50.10 CROHN'S COLITIS (HCC): Chronic | Status: RESOLVED | Noted: 2017-08-03 | Resolved: 2017-08-19

## 2017-08-19 PROBLEM — D25.9 UTERINE LEIOMYOMA: Chronic | Status: RESOLVED | Noted: 2017-08-03 | Resolved: 2017-08-19

## 2017-08-19 LAB
ANION GAP SERPL CALC-SCNC: 11 MMOL/L (ref 5–15)
ANION GAP SERPL CALC-SCNC: 3 MMOL/L (ref 5–15)
APPEARANCE FLD: ABNORMAL
APPEARANCE UR: ABNORMAL
BACTERIA URNS QL MICRO: ABNORMAL /HPF
BASOPHILS # BLD: 0 K/UL (ref 0–0.1)
BASOPHILS # BLD: 0 K/UL (ref 0–0.1)
BASOPHILS NFR BLD: 0 % (ref 0–1)
BASOPHILS NFR BLD: 0 % (ref 0–1)
BILIRUB UR QL: NEGATIVE
BODY FLD TYPE: NORMAL
BUN SERPL-MCNC: 5 MG/DL (ref 6–20)
BUN SERPL-MCNC: 6 MG/DL (ref 6–20)
BUN/CREAT SERPL: 6 (ref 12–20)
BUN/CREAT SERPL: 8 (ref 12–20)
CALCIUM SERPL-MCNC: 8.5 MG/DL (ref 8.5–10.1)
CALCIUM SERPL-MCNC: 8.6 MG/DL (ref 8.5–10.1)
CHLORIDE SERPL-SCNC: 104 MMOL/L (ref 97–108)
CHLORIDE SERPL-SCNC: 104 MMOL/L (ref 97–108)
CO2 SERPL-SCNC: 25 MMOL/L (ref 21–32)
CO2 SERPL-SCNC: 30 MMOL/L (ref 21–32)
COLOR FLD: YELLOW
COLOR UR: ABNORMAL
CREAT SERPL-MCNC: 0.79 MG/DL (ref 0.55–1.02)
CREAT SERPL-MCNC: 0.85 MG/DL (ref 0.55–1.02)
CRP SERPL HS-MCNC: >9.5 MG/L
CRYSTALS FLD MICRO: NORMAL
EOSINOPHIL # BLD: 0 K/UL (ref 0–0.4)
EOSINOPHIL # BLD: 0 K/UL (ref 0–0.4)
EOSINOPHIL NFR BLD: 0 % (ref 0–7)
EOSINOPHIL NFR BLD: 0 % (ref 0–7)
EPITH CASTS URNS QL MICRO: ABNORMAL /LPF
ERYTHROCYTE [DISTWIDTH] IN BLOOD BY AUTOMATED COUNT: 14.1 % (ref 11.5–14.5)
ERYTHROCYTE [DISTWIDTH] IN BLOOD BY AUTOMATED COUNT: 14.3 % (ref 11.5–14.5)
ERYTHROCYTE [SEDIMENTATION RATE] IN BLOOD: 95 MM/HR (ref 0–30)
GLUCOSE SERPL-MCNC: 134 MG/DL (ref 65–100)
GLUCOSE SERPL-MCNC: 140 MG/DL (ref 65–100)
GLUCOSE UR STRIP.AUTO-MCNC: NEGATIVE MG/DL
GRAM STN SPEC: NORMAL
GRAM STN SPEC: NORMAL
HCT VFR BLD AUTO: 26.6 % (ref 35–47)
HCT VFR BLD AUTO: 27.2 % (ref 35–47)
HGB BLD-MCNC: 8.4 G/DL (ref 11.5–16)
HGB BLD-MCNC: 8.9 G/DL (ref 11.5–16)
HGB UR QL STRIP: ABNORMAL
KETONES UR QL STRIP.AUTO: NEGATIVE MG/DL
LEUKOCYTE ESTERASE UR QL STRIP.AUTO: ABNORMAL
LYMPHOCYTES # BLD: 0.6 K/UL (ref 0.8–3.5)
LYMPHOCYTES # BLD: 0.8 K/UL (ref 0.8–3.5)
LYMPHOCYTES NFR BLD: 5 % (ref 12–49)
LYMPHOCYTES NFR BLD: 6 % (ref 12–49)
LYMPHOCYTES NFR FLD: 1 %
MAGNESIUM SERPL-MCNC: 1.9 MG/DL (ref 1.6–2.4)
MCH RBC QN AUTO: 30.5 PG (ref 26–34)
MCH RBC QN AUTO: 31 PG (ref 26–34)
MCHC RBC AUTO-ENTMCNC: 31.6 G/DL (ref 30–36.5)
MCHC RBC AUTO-ENTMCNC: 32.7 G/DL (ref 30–36.5)
MCV RBC AUTO: 94.8 FL (ref 80–99)
MCV RBC AUTO: 96.7 FL (ref 80–99)
MONOCYTES # BLD: 0.9 K/UL (ref 0–1)
MONOCYTES # BLD: 1.1 K/UL (ref 0–1)
MONOCYTES NFR BLD: 7 % (ref 5–13)
MONOCYTES NFR BLD: 8 % (ref 5–13)
MONOS+MACROS NFR FLD: 11 %
NEUTROPHILS NFR FLD: 88 %
NEUTS SEG # BLD: 11.4 K/UL (ref 1.8–8)
NEUTS SEG # BLD: 12.2 K/UL (ref 1.8–8)
NEUTS SEG NFR BLD: 86 % (ref 32–75)
NEUTS SEG NFR BLD: 88 % (ref 32–75)
NITRITE UR QL STRIP.AUTO: NEGATIVE
NUC CELL # FLD: ABNORMAL /CU MM (ref 0–5)
PH UR STRIP: 6.5 [PH] (ref 5–8)
PLATELET # BLD AUTO: 328 K/UL (ref 150–400)
PLATELET # BLD AUTO: 332 K/UL (ref 150–400)
POTASSIUM SERPL-SCNC: 4.1 MMOL/L (ref 3.5–5.1)
POTASSIUM SERPL-SCNC: 4.1 MMOL/L (ref 3.5–5.1)
PROT UR STRIP-MCNC: NEGATIVE MG/DL
RBC # BLD AUTO: 2.75 M/UL (ref 3.8–5.2)
RBC # BLD AUTO: 2.87 M/UL (ref 3.8–5.2)
RBC # FLD: >100 /CU MM
RBC #/AREA URNS HPF: ABNORMAL /HPF (ref 0–5)
RBC MORPH BLD: ABNORMAL
SERVICE CMNT-IMP: NORMAL
SODIUM SERPL-SCNC: 137 MMOL/L (ref 136–145)
SODIUM SERPL-SCNC: 140 MMOL/L (ref 136–145)
SP GR UR REFRACTOMETRY: 1.02 (ref 1–1.03)
SPECIMEN SOURCE FLD: ABNORMAL
URATE UR-MCNC: 42.7 MG/DL
UROBILINOGEN UR QL STRIP.AUTO: 0.2 EU/DL (ref 0.2–1)
VIT B12 SERPL-MCNC: 1010 PG/ML (ref 211–911)
WBC # BLD AUTO: 12.9 K/UL (ref 3.6–11)
WBC # BLD AUTO: 14.1 K/UL (ref 3.6–11)
WBC MORPH BLD: ABNORMAL
WBC URNS QL MICRO: ABNORMAL /HPF (ref 0–4)

## 2017-08-19 PROCEDURE — 74011000250 HC RX REV CODE- 250: Performed by: NURSE PRACTITIONER

## 2017-08-19 PROCEDURE — 87077 CULTURE AEROBIC IDENTIFY: CPT | Performed by: EMERGENCY MEDICINE

## 2017-08-19 PROCEDURE — 74011250636 HC RX REV CODE- 250/636: Performed by: INTERNAL MEDICINE

## 2017-08-19 PROCEDURE — 74011000258 HC RX REV CODE- 258: Performed by: INTERNAL MEDICINE

## 2017-08-19 PROCEDURE — 96376 TX/PRO/DX INJ SAME DRUG ADON: CPT

## 2017-08-19 PROCEDURE — 83735 ASSAY OF MAGNESIUM: CPT | Performed by: NURSE PRACTITIONER

## 2017-08-19 PROCEDURE — 96374 THER/PROPH/DIAG INJ IV PUSH: CPT

## 2017-08-19 PROCEDURE — 99218 HC RM OBSERVATION: CPT

## 2017-08-19 PROCEDURE — 0S9D4ZZ DRAINAGE OF LEFT KNEE JOINT, PERCUTANEOUS ENDOSCOPIC APPROACH: ICD-10-PCS | Performed by: ORTHOPAEDIC SURGERY

## 2017-08-19 PROCEDURE — 86141 C-REACTIVE PROTEIN HS: CPT | Performed by: NURSE PRACTITIONER

## 2017-08-19 PROCEDURE — 74011250636 HC RX REV CODE- 250/636: Performed by: NURSE PRACTITIONER

## 2017-08-19 PROCEDURE — 85025 COMPLETE CBC W/AUTO DIFF WBC: CPT | Performed by: NURSE PRACTITIONER

## 2017-08-19 PROCEDURE — 80048 BASIC METABOLIC PNL TOTAL CA: CPT | Performed by: NURSE PRACTITIONER

## 2017-08-19 PROCEDURE — 73562 X-RAY EXAM OF KNEE 3: CPT

## 2017-08-19 PROCEDURE — 87075 CULTR BACTERIA EXCEPT BLOOD: CPT | Performed by: NURSE PRACTITIONER

## 2017-08-19 PROCEDURE — 99285 EMERGENCY DEPT VISIT HI MDM: CPT

## 2017-08-19 PROCEDURE — 85652 RBC SED RATE AUTOMATED: CPT | Performed by: NURSE PRACTITIONER

## 2017-08-19 PROCEDURE — 89060 EXAM SYNOVIAL FLUID CRYSTALS: CPT | Performed by: NURSE PRACTITIONER

## 2017-08-19 PROCEDURE — 87086 URINE CULTURE/COLONY COUNT: CPT | Performed by: EMERGENCY MEDICINE

## 2017-08-19 PROCEDURE — 74011250637 HC RX REV CODE- 250/637: Performed by: NURSE PRACTITIONER

## 2017-08-19 PROCEDURE — 36415 COLL VENOUS BLD VENIPUNCTURE: CPT | Performed by: NURSE PRACTITIONER

## 2017-08-19 PROCEDURE — 87186 SC STD MICRODIL/AGAR DIL: CPT | Performed by: EMERGENCY MEDICINE

## 2017-08-19 PROCEDURE — 81001 URINALYSIS AUTO W/SCOPE: CPT | Performed by: NURSE PRACTITIONER

## 2017-08-19 PROCEDURE — 84560 ASSAY OF URINE/URIC ACID: CPT | Performed by: NURSE PRACTITIONER

## 2017-08-19 PROCEDURE — 89050 BODY FLUID CELL COUNT: CPT | Performed by: NURSE PRACTITIONER

## 2017-08-19 PROCEDURE — 87205 SMEAR GRAM STAIN: CPT | Performed by: NURSE PRACTITIONER

## 2017-08-19 PROCEDURE — 82607 VITAMIN B-12: CPT | Performed by: INTERNAL MEDICINE

## 2017-08-19 PROCEDURE — L1830 KO IMMOB CANVAS LONG PRE OTS: HCPCS

## 2017-08-19 PROCEDURE — 75810000054 HC ARTHOCENTISIS JOINT

## 2017-08-19 RX ORDER — LIDOCAINE HYDROCHLORIDE 10 MG/ML
20 INJECTION, SOLUTION EPIDURAL; INFILTRATION; INTRACAUDAL; PERINEURAL ONCE
Status: COMPLETED | OUTPATIENT
Start: 2017-08-19 | End: 2017-08-19

## 2017-08-19 RX ORDER — OXYCODONE AND ACETAMINOPHEN 5; 325 MG/1; MG/1
1 TABLET ORAL
Status: DISCONTINUED | OUTPATIENT
Start: 2017-08-19 | End: 2017-08-28

## 2017-08-19 RX ORDER — METRONIDAZOLE 250 MG/1
500 TABLET ORAL 3 TIMES DAILY
Status: DISCONTINUED | OUTPATIENT
Start: 2017-08-19 | End: 2017-08-26

## 2017-08-19 RX ORDER — NAPROXEN 375 MG/1
375 TABLET, DELAYED RELEASE ORAL 2 TIMES DAILY
Status: DISCONTINUED | OUTPATIENT
Start: 2017-08-19 | End: 2017-08-19 | Stop reason: DRUGHIGH

## 2017-08-19 RX ORDER — MORPHINE SULFATE 2 MG/ML
4 INJECTION, SOLUTION INTRAMUSCULAR; INTRAVENOUS ONCE
Status: COMPLETED | OUTPATIENT
Start: 2017-08-19 | End: 2017-08-19

## 2017-08-19 RX ORDER — LOSARTAN POTASSIUM 50 MG/1
25 TABLET ORAL DAILY
Status: DISCONTINUED | OUTPATIENT
Start: 2017-08-20 | End: 2017-08-30 | Stop reason: HOSPADM

## 2017-08-19 RX ORDER — CIPROFLOXACIN 500 MG/1
500 TABLET ORAL 2 TIMES DAILY
Status: DISCONTINUED | OUTPATIENT
Start: 2017-08-19 | End: 2017-08-23

## 2017-08-19 RX ORDER — SODIUM CHLORIDE 0.9 % (FLUSH) 0.9 %
5-10 SYRINGE (ML) INJECTION AS NEEDED
Status: DISCONTINUED | OUTPATIENT
Start: 2017-08-19 | End: 2017-08-30 | Stop reason: HOSPADM

## 2017-08-19 RX ORDER — ONDANSETRON 2 MG/ML
4 INJECTION INTRAMUSCULAR; INTRAVENOUS
Status: DISCONTINUED | OUTPATIENT
Start: 2017-08-19 | End: 2017-08-30 | Stop reason: HOSPADM

## 2017-08-19 RX ORDER — MORPHINE SULFATE 4 MG/ML
2 INJECTION, SOLUTION INTRAMUSCULAR; INTRAVENOUS
Status: DISCONTINUED | OUTPATIENT
Start: 2017-08-19 | End: 2017-08-30 | Stop reason: HOSPADM

## 2017-08-19 RX ORDER — ONDANSETRON 4 MG/1
4 TABLET, ORALLY DISINTEGRATING ORAL
Status: DISCONTINUED | OUTPATIENT
Start: 2017-08-19 | End: 2017-08-30 | Stop reason: HOSPADM

## 2017-08-19 RX ORDER — HYDROCODONE BITARTRATE AND ACETAMINOPHEN 5; 325 MG/1; MG/1
1 TABLET ORAL
Qty: 20 TAB | Refills: 0 | Status: SHIPPED | OUTPATIENT
Start: 2017-08-19 | End: 2017-08-30

## 2017-08-19 RX ORDER — DIPHENHYDRAMINE HCL 25 MG
25 CAPSULE ORAL
Status: DISCONTINUED | OUTPATIENT
Start: 2017-08-19 | End: 2017-08-30 | Stop reason: HOSPADM

## 2017-08-19 RX ORDER — DIPHENHYDRAMINE HYDROCHLORIDE 50 MG/ML
12.5 INJECTION, SOLUTION INTRAMUSCULAR; INTRAVENOUS
Status: DISCONTINUED | OUTPATIENT
Start: 2017-08-19 | End: 2017-08-30 | Stop reason: HOSPADM

## 2017-08-19 RX ORDER — NALOXONE HYDROCHLORIDE 0.4 MG/ML
0.4 INJECTION, SOLUTION INTRAMUSCULAR; INTRAVENOUS; SUBCUTANEOUS AS NEEDED
Status: DISCONTINUED | OUTPATIENT
Start: 2017-08-19 | End: 2017-08-30 | Stop reason: HOSPADM

## 2017-08-19 RX ORDER — ACETAMINOPHEN 325 MG/1
650 TABLET ORAL
Status: DISCONTINUED | OUTPATIENT
Start: 2017-08-19 | End: 2017-08-28

## 2017-08-19 RX ORDER — NAPROXEN 250 MG/1
500 TABLET ORAL 2 TIMES DAILY WITH MEALS
Status: DISCONTINUED | OUTPATIENT
Start: 2017-08-19 | End: 2017-08-30

## 2017-08-19 RX ORDER — ENOXAPARIN SODIUM 100 MG/ML
40 INJECTION SUBCUTANEOUS EVERY 24 HOURS
Status: DISCONTINUED | OUTPATIENT
Start: 2017-08-19 | End: 2017-08-22

## 2017-08-19 RX ORDER — SODIUM CHLORIDE 0.9 % (FLUSH) 0.9 %
5-10 SYRINGE (ML) INJECTION EVERY 8 HOURS
Status: DISCONTINUED | OUTPATIENT
Start: 2017-08-19 | End: 2017-08-30 | Stop reason: HOSPADM

## 2017-08-19 RX ORDER — DOCUSATE SODIUM 100 MG/1
100 CAPSULE, LIQUID FILLED ORAL 2 TIMES DAILY
Status: DISCONTINUED | OUTPATIENT
Start: 2017-08-19 | End: 2017-08-30 | Stop reason: HOSPADM

## 2017-08-19 RX ADMIN — CIPROFLOXACIN HYDROCHLORIDE 500 MG: 500 TABLET, FILM COATED ORAL at 21:10

## 2017-08-19 RX ADMIN — VANCOMYCIN HYDROCHLORIDE 2500 MG: 10 INJECTION, POWDER, LYOPHILIZED, FOR SOLUTION INTRAVENOUS at 23:07

## 2017-08-19 RX ADMIN — LIDOCAINE HYDROCHLORIDE 2 ML: 10 INJECTION, SOLUTION EPIDURAL; INFILTRATION; INTRACAUDAL; PERINEURAL at 12:18

## 2017-08-19 RX ADMIN — MORPHINE SULFATE 4 MG: 2 INJECTION, SOLUTION INTRAMUSCULAR; INTRAVENOUS at 13:33

## 2017-08-19 RX ADMIN — CEFTRIAXONE SODIUM 1 G: 1 INJECTION, POWDER, FOR SOLUTION INTRAMUSCULAR; INTRAVENOUS at 21:09

## 2017-08-19 RX ADMIN — OXYCODONE HYDROCHLORIDE AND ACETAMINOPHEN 1 TABLET: 5; 325 TABLET ORAL at 19:33

## 2017-08-19 RX ADMIN — MORPHINE SULFATE 4 MG: 2 INJECTION, SOLUTION INTRAMUSCULAR; INTRAVENOUS at 10:40

## 2017-08-19 RX ADMIN — Medication 10 ML: at 21:11

## 2017-08-19 RX ADMIN — NAPROXEN 500 MG: 250 TABLET ORAL at 16:46

## 2017-08-19 NOTE — ED NOTES
Patient resting in bed, states her pain is controlled at this time as long as she does not move, monitor x2.

## 2017-08-19 NOTE — ED PROVIDER NOTES
HPI Comments: 50 y.o. female with past medical history significant for HTN, Crohn's disease, and ulcerative colitis who presents to the ED via EMS with chief complaint of left knee pain. Pt reports she was recently admitted to [facility] for diverticulitis on 8/12 and was treated with Flagyl. Pt states she developed a right knee effusion during her admission, says it was tapped by Ortho and got better. Pt states she was discharged home and last night overnight she developed a left knee effusion with \"excruciating\" 10/10 pain and swelling so she came to the ED for evaluation. Denies fever or chills, denies nausea or vomiting. States she talked to her GI doctor and he stated to stop taking Flagyl. Patient did not take this morning. There are no other acute medical complaints voiced at this time. PCP: Vada Bumpers, MD    Note written by Lila Nuñez, as dictated by Elenita Hoover NP 12:15 PM     The history is provided by the patient. Past Medical History:   Diagnosis Date    Crohn's colitis ([facility] [address]     Hypertension     Ill-defined condition     history of  ulcerative colitis       Past Surgical History:   Procedure Laterality Date    COLONOSCOPY N/A 8/14/2017    COLONOSCOPY performed by Peter Rivera MD at [facility] HX APPENDECTOMY      1993    HX CHOLECYSTECTOMY N/A     HX TONSIL AND ADENOIDECTOMY Bilateral     HX TONSIL AND ADENOIDECTOMY      HX WISDOM TEETH EXTRACTION           No family history on file. Social History     Social History    Marital status: SINGLE     Spouse name: N/A    Number of children: N/A    Years of education: N/A     Occupational History    Not on file.      Social History Main Topics    Smoking status: Never Smoker    Smokeless tobacco: Never Used      Comment: some smoking in college    Alcohol use 1.8 oz/week     3 Glasses of wine per week    Drug use: No    Sexual activity: No     Other Topics Concern    Not on file     Social History Narrative ALLERGIES: Codeine and Sulfa (sulfonamide antibiotics)    Review of Systems   Constitutional: Positive for activity change (states unable to weight bear on left lower extremity. ). Negative for appetite change, chills, diaphoresis, fatigue and fever. HENT: Negative for sore throat and trouble swallowing. Eyes: Negative for photophobia, pain, redness and visual disturbance. Respiratory: Negative for shortness of breath and wheezing. Cardiovascular: Negative for chest pain and palpitations. Gastrointestinal: Negative for abdominal pain, diarrhea, nausea and vomiting. Endocrine: Negative. Genitourinary: Negative for dysuria, frequency, hematuria and urgency. Musculoskeletal: Positive for gait problem (states unable to weight bear on left) and joint swelling (left knee). Negative for back pain, neck pain and neck stiffness. +left knee pain and swelling   Skin: Negative for color change, pallor, rash and wound. Allergic/Immunologic: Negative. Neurological: Negative for speech difficulty, weakness and light-headedness. Hematological: Does not bruise/bleed easily. Psychiatric/Behavioral: Negative for behavioral problems. The patient is nervous/anxious. All other systems reviewed and are negative. Vitals:    08/19/17 1030 08/19/17 1045 08/19/17 1123 08/19/17 1130   BP: 116/63 106/47 101/57 103/62   Pulse:       Resp:       Temp:       SpO2: 100% 100% 100% 100%   Weight:       Height:                Physical Exam   Constitutional: She is oriented to person, place, and time. She appears well-developed and well-nourished. No distress. HENT:   Head: Normocephalic and atraumatic. Right Ear: External ear normal.   Left Ear: External ear normal.   Nose: Nose normal.   Mouth/Throat: Oropharynx is clear and moist.   Eyes: Conjunctivae and EOM are normal. Pupils are equal, round, and reactive to light. Right eye exhibits no discharge. Left eye exhibits no discharge.    Neck: Normal range of motion. Neck supple. No JVD present. No tracheal deviation present. Cardiovascular: Normal rate, regular rhythm, normal heart sounds and intact distal pulses. Exam reveals no gallop. No murmur heard. Palpable pulses. Pulmonary/Chest: Effort normal and breath sounds normal. No respiratory distress. She has no wheezes. She has no rales. She exhibits no tenderness. Abdominal: Soft. Bowel sounds are normal. She exhibits no distension. There is no tenderness. There is no rebound and no guarding. Musculoskeletal: Normal range of motion. She exhibits edema and tenderness. She exhibits no deformity. Positive left knee swelling and 10/10 pain. Neurological: She is alert and oriented to person, place, and time. Skin: Skin is warm and dry. No rash noted. No erythema. No pallor. Psychiatric: She has a normal mood and affect. Her behavior is normal. Judgment and thought content normal.   Nursing note and vitals reviewed. MDM  Number of Diagnoses or Management Options  Acute pain of left knee: new and requires workup  Diagnosis management comments: Plan:  Admit  To Orthopedics for increased pain and immobility. Patient states she is willing to pay Observation bill. ED Course       Procedures    PROGRESS NOTE:  12:16 PM   Films negative. Ortho has been consulted and will drain pt's left knee effusion. 1:33 PM  Pt has been reexamined. Pt has no new complaints, changes or physical findings. Care plan outlined and precautions discussed. All available results were reviewed with pt. All medications were reviewed with pt. All of pt's questions and concerns were addressed. Pt agrees to F/U as instructed and agrees to return to ED upon further deterioration. Pt is ready to go home. Awaiting ride. 9033 Vinicio Aragon NP    1180: Patient states \"I can't get up. \" \"I do not feel comfortable going home. \" Patient states she wants to be admitted to hospital and will pay the bill out of pocket.  Patient does not meet inpatient criteria. - Nursing supervisor at bedside  -  Christine Fuentes on call ) called by Dr. Amol Marquez regarding situation.    - Dr. Jovita Caraballo (hospitalist) aware of situation but does not meet inpatient or observation criteria. - Orthopedics to admit patient for pain and immobility. Discussed with Dr. Jazmyne Recinos.

## 2017-08-19 NOTE — ROUTINE PROCESS
Primary Nurse Taqueria Muller RN and Amor Grijalva RN performed a dual skin assessment on this patient No impairment noted

## 2017-08-19 NOTE — ED TRIAGE NOTES
Left knee pain that began last night, now traveling down entire left. Patient yelling during triage, unable to move herself without pain.

## 2017-08-19 NOTE — IP AVS SNAPSHOT
[address] [place] [address] 
[phone] Patient: Zack Quinn MRN: [id number] :1965 You are allergic to the following Allergen Reactions Codeine Nausea and Vomiting  
    
 Sulfa (Sulfonamide Antibiotics) Other (comments) Makes face flush Recent Documentation Height Weight Breastfeeding? BMI OB Status Smoking Status 1.676 m 95.3 kg No 33.89 kg/m2 Premenopausal Never Smoker Unresulted Labs Order Current Status SAMPLE TO BLOOD BANK In process CULTURE, ANAEROBIC Preliminary result CULTURE, ANAEROBIC Preliminary result CULTURE, BODY FLUID W GRAM STAIN Preliminary result CULTURE, BODY FLUID W GRAM STAIN Preliminary result CULTURE, BODY FLUID W GRAM STAIN Preliminary result Emergency Contacts Name Discharge Info Relation Home Work Mobile [address] CAREGIVER [3] Other Relative [6] [phone] About your hospitalization You were admitted on:  2017 You last received care in the:  [facility] 4M POST SURG ORT 1 You were discharged on:  2017 Unit phone number:  [phone] Why you were hospitalized Your primary diagnosis was:  Knee Pain, Left Your diagnoses also included:  Crohn's Disease Of Both Small And Large Intestine Without Complication (Hcc), Essential Hypertension, Anemia, Debilitated Patient, Obese, Uti (Urinary Tract Infection), Leukocytosis, Septic Arthritis Of Knee, Left (Hcc) Providers Seen During Your Hospitalizations Provider Role Specialty Primary office phone Kimberly Cobos MD Attending Provider Emergency Medicine [phone] Carrie Kaur MD Attending Provider Orthopedic Surgery [phone] Your Primary Care Physician (PCP) Primary Care Physician Office Phone Office Fax Pro [phone] ** None ** Follow-up Information Follow up With Details Comments Contact Info Erika Edge MD In 1 week  [facility] [address] [facility] [place] [address] [address] [place] [address] 
[phone] [facility] EMERGENCY DEPT  If symptoms worsen [address] 
[phone] Gricelda Gonzalez NP Go on 9/5/2017 Appointment time is 2PM. Please arrive 30 minutes early. [facility] 
[address] [address] [address] [phone] Ting Isabel MD Go on 9/13/2017 Appointment time is 2:15PM. Sydpaul Lipoma will be seeing Dr. Amy Dotson PA. Please arrive 15 minutes early. [address] [facility] [address] [place] [address] 
[phone] [facility]   [address] [place] [address],[address] [place] [address] 
[phone] Your Appointments Tuesday September 05, 2017  2:00 PM EDT New Patient with Gricelda Gonzalez NP [address] [facility] ([address]) [address] [address] [facility] [address] [phone] Current Discharge Medication List  
  
START taking these medications Dose & Instructions Dispensing Information Comments Morning Noon Evening Bedtime  
 azaTHIOprine 50 mg tablet Commonly known as:  The [employer]i Your last dose was: Your next dose is:    
   
   
 Dose:  150 mg Take 3 Tabs by mouth daily (after breakfast). Quantity:  30 Tab Refills:  0  
     
   
   
   
  
 docusate sodium 100 mg capsule Commonly known as:  Miriam Dilling Your last dose was: Your next dose is:    
   
   
 Dose:  100 mg Take 1 Cap by mouth two (2) times a day. Hold for loose stools Quantity:  60 Cap Refills:  2  
     
   
   
   
  
 gabapentin 100 mg capsule Commonly known as:  NEURONTIN Your last dose was: Your next dose is:    
   
   
 Dose:  100 mg Take 1 Cap by mouth three (3) times daily. Quantity:  90 Cap Refills:  0  
     
   
   
   
  
 mesalamine 400 mg Cdti capsule Commonly known as:  DELZICOL Your last dose was: Your next dose is:    
   
   
 Dose:  800 mg Take 2 Caps by mouth three (3) times daily. Quantity:  90 Cap Refills:  0  
     
   
   
   
  
 methylPREDNISolone 4 mg tablet Commonly known as:  Dagoberto Perez Your last dose was: Your next dose is: As directed. Quantity:  1 Dose Pack Refills:  0  
     
   
   
   
  
 nystatin 100,000 unit/mL suspension Commonly known as:  MYCOSTATIN Your last dose was: Your next dose is:    
   
   
 Dose:  850254 Units Take 5 mL by mouth four (4) times daily for 5 days. swish and spit Quantity:  100 mL Refills:  0  
     
   
   
   
  
 ondansetron 4 mg disintegrating tablet Commonly known as:  ZOFRAN ODT Your last dose was: Your next dose is:    
   
   
 Dose:  4 mg Take 1 Tab by mouth every four (4) hours as needed. Quantity:  30 Tab Refills:  0  
     
   
   
   
  
 * OTHER(NON-FORMULARY) Your last dose was: Your next dose is:    
   
   
 Daptomycin 600mg IV daily through 10/19/17 Quantity:  1 Each Refills:  0  
     
   
   
   
  
 * OTHER(NON-FORMULARY) Your last dose was: Your next dose is:    
   
   
 Invanz 1gm IV daily through 10/19/17 Quantity:  1 Each Refills:  0  
     
   
   
   
  
 oxyCODONE IR 5 mg immediate release tablet Commonly known as:  Chantel Shepherd Your last dose was: Your next dose is:    
   
   
 Dose:  5 mg Take 1 Tab by mouth every four (4) hours as needed for up to 7 days. Max Daily Amount: 30 mg.  
 Quantity:  40 Tab Refills:  0  
     
   
   
   
  
 polyethylene glycol 17 gram packet Commonly known as:  Rodney Sheldon Your last dose was: Your next dose is:    
   
   
 Dose:  17 g Take 1 Packet by mouth daily. HOLD for loose stools Quantity:  30 Each Refills:  0 * Notice: This list has 2 medication(s) that are the same as other medications prescribed for you. Read the directions carefully, and ask your doctor or other care provider to review them with you. CONTINUE these medications which have NOT CHANGED Dose & Instructions Dispensing Information Comments Morning Noon Evening Bedtime  
 losartan 25 mg tablet Commonly known as:  COZAAR Your last dose was: Your next dose is: TAKE 1 TAB BY MOUTH DAILY FOR 90 DAYS. Refills:  1 STOP taking these medications   
 ciprofloxacin HCl 500 mg tablet Commonly known as:  CIPRO Where to Get Your Medications Information on where to get these meds will be given to you by the nurse or doctor. ! Ask your nurse or doctor about these medications  
  azaTHIOprine 50 mg tablet  
 docusate sodium 100 mg capsule  
 gabapentin 100 mg capsule  
 mesalamine 400 mg Cdti capsule  
 methylPREDNISolone 4 mg tablet  
 nystatin 100,000 unit/mL suspension  
 ondansetron 4 mg disintegrating tablet OTHER(NON-FORMULARY) OTHER(NON-FORMULARY)  
 oxyCODONE IR 5 mg immediate release tablet  
 polyethylene glycol 17 gram packet Discharge Instructions We hope that we have addressed all of your medical concerns. The examination and treatment you received in the Emergency Department were for an emergent problem and were not intended as complete care. It is important that you follow up with your healthcare provider(s) for ongoing care. If your symptoms worsen or do not improve as expected, and you are unable to reach your usual health care provider(s), you should return to the Emergency Department. Today's healthcare is undergoing tremendous change, and patient satisfaction surveys are one of the many tools to assess the quality of medical care.   You may receive a survey from the [email or web address] regarding your experience in the Emergency Department. I hope that your experience has been completely positive, particularly the medical care that I provided. As such, please participate in the survey; anything less than excellent does not meet my expectations or intentions. [facility] hospital[facility] and [address] [place] [address] participate in nationally recognized quality of care measures. If your blood pressure is greater than 120/80, as reported below, we urge that you seek medical care to address the potential of high blood pressure, commonly known as hypertension. Hypertension can be hereditary or can be caused by certain medical conditions, pain, stress, or \"white coat syndrome. \" Please make an appointment with your health care provider(s) for follow up of your Emergency Department visit. VITALS:  
Patient Vitals for the past 8 hrs: 
 Temp Pulse Resp BP SpO2  
08/19/17 1230 - - - 94/49 100 % 08/19/17 1130 - - - 103/62 100 % 08/19/17 1123 - - - 101/57 100 % 08/19/17 1045 - - - 106/47 100 % 08/19/17 1030 - - - 116/63 100 % 08/19/17 1023 98 °F (36.7 °C) 74 15 123/64 100 % 08/19/17 1022 - - - 123/64 (!) 81 % Thank you for allowing us to provide you with medical care today. We realize that you have many choices for your emergency care needs. Please choose us in the future for any continued health care needs. James Peralta NP Critical access hospital[facility].  
Office: [phone] Recent Results (from the past 24 hour(s)) URINALYSIS W/MICROSCOPIC Collection Time: 08/19/17 11:08 AM  
Result Value Ref Range Color YELLOW/STRAW Appearance CLOUDY (A) CLEAR Specific gravity 1.016 1.003 - 1.030    
 pH (UA) 6.5 5.0 - 8.0 Protein NEGATIVE  NEG mg/dL Glucose NEGATIVE  NEG mg/dL Ketone NEGATIVE  NEG mg/dL Bilirubin NEGATIVE  NEG  Blood MODERATE (A) NEG    
 Urobilinogen 0.2 0.2 - 1.0 EU/dL Nitrites NEGATIVE  NEG Leukocyte Esterase LARGE (A) NEG    
 WBC 10-20 0 - 4 /hpf  
 RBC 0-5 0 - 5 /hpf Epithelial cells FEW FEW /lpf Bacteria 1+ (A) NEG /hpf Xr Knee Lt 3 V Result Date: 8/19/2017 EXAM:  XR KNEE LT 3 V INDICATION:   pain left knee. No injury. COMPARISON: None. FINDINGS: Three views of the left knee demonstrate no fracture or dislocation. There is a joint effusion. There is osteoarthritis. IMPRESSION:  Osteoarthritis and effusion. No fracture. Joint Pain: Care Instructions Your Care Instructions Many people have small aches and pains from overuse or injury to muscles and joints. Joint injuries often happen during sports or recreation, work tasks, or projects around the home. An overuse injury can happen when you put too much stress on a joint or when you do an activity that stresses the joint over and over, such as using the computer or rowing a boat. You can take action at home to help your muscles and joints get better. You should feel better in 1 to 2 weeks, but it can take 3 months or more to heal completely. Follow-up care is a key part of your treatment and safety. Be sure to make and go to all appointments, and call your doctor if you are having problems. It's also a good idea to know your test results and keep a list of the medicines you take. How can you care for yourself at home? · Do not put weight on the injured joint for at least a day or two. · For the first day or two after an injury, do not take hot showers or baths, and do not use hot packs. The heat could make swelling worse. · Put ice or a cold pack on the sore joint for 10 to 20 minutes at a time. Try to do this every 1 to 2 hours for the next 3 days (when you are awake) or until the swelling goes down. Put a thin cloth between the ice and your skin. · Wrap the injury in an elastic bandage.  Do not wrap it too tightly because this can cause more swelling. · Prop up the sore joint on a pillow when you ice it or anytime you sit or lie down during the next 3 days. Try to keep it above the level of your heart. This will help reduce swelling. · Take an over-the-counter pain medicine, such as acetaminophen (Tylenol), ibuprofen (Advil, Motrin), or naproxen (Aleve). Read and follow all instructions on the label. · After 1 or 2 days of rest, begin moving the joint gently. While the joint is still healing, you can begin to exercise using activities that do not strain or hurt the painful joint. When should you call for help? Call your doctor now or seek immediate medical care if: 
· You have signs of infection, such as: 
¨ Increased pain, swelling, warmth, and redness. ¨ Red streaks leading from the joint. ¨ A fever. Watch closely for changes in your health, and be sure to contact your doctor if: 
· Your movement or symptoms are not getting better after 1 to 2 weeks of home treatment. Where can you learn more? Go to http://indira-guevara.info/. Enter P205 in the search box to learn more about \"Joint Pain: Care Instructions. \" Current as of: March 21, 2017 Content Version: 11.3 © 1725-7096 [employer]. Care instructions adapted under license by [employer] (which disclaims liability or warranty for this information). If you have questions about a medical condition or this instruction, always ask your healthcare professional. [place] [address] any warranty or liability for your use of this information. ORTHO: 
 
Pain Control: Adequate with oral agents DVT Prophylaxis:  SCDs, JAMEEL Hose Therapy/ Weight bearing: WBAT BLE's Wound/ incisional issues: Sutures removed on 9/4/17, then place steristrips. Medical concerns: Hgb stable at 8.3. SCD's on in bed at all times. Lovenox held due to decrease hgb.  Hospitalist ordering BLE US to eval for DVT's  
 Disposition: To [facility] today. Follow up with Family Practice for medical eval in one week. Follow up with GI as discussed and follow up with Dr. Pankaj Brewster with Ortho in 2 weeks. [phone] Long term IV antibiotics per Infectious Disease. Discharge Orders None Introducing \[facility]\"" & HEALTH SERVICES! Dear Hector Luong: Thank you for requesting a [employer] account. Our records indicate that you already have an active [employer] account. You can access your account anytime at [email or web address] Did you know that you can access your hospital and ER discharge instructions at any time in [employer]? You can also review all of your test results from your hospital stay or ER visit. Additional Information If you have questions, please visit the Frequently Asked Questions section of the [employer] website at [email or web address] Remember, [employer] is NOT to be used for urgent needs. For medical emergencies, dial 911. Now available from your iPhone and Android! General Information Please provide this summary of care documentation to your next provider. Patient Signature:  ____________________________________________________________ Date:  ____________________________________________________________  
  
Aloma Snellen Provider Signature:  ____________________________________________________________ Date:  ____________________________________________________________

## 2017-08-19 NOTE — ED NOTES
Pt states she does not feel comfortable going home. Primary RN Nafisa Chan and DANAE Carrillo notified. DANAE Carrillo, BP went to bedside and talked in depth with patient. LAMAR Sesay, LAMAR Chan, DANAE Carrillo and DANAE Saucedo Common at bedside. Left knee immobilizer placed on pt's left knee and crutch instructions explained. Pt was able to stand up with assist and able to sit down in wheelchair. Pt states, \"I still do not feel comfortable going home, why can they just not keep me? Gayatri Huertaer" DANAE Carrillo spoke with patient and states, \"if you are admitted it will need to be under observation and you will have to pay out of pocket because your insurance will not cover you. Do you agree with that? \" pt nodded her head and states \"I do. \" pt placed back in bed by staff.

## 2017-08-19 NOTE — CONSULTS
[address]  [phone]    Hospitalist Consult Note      NAME:  Nieves Molina   :   1965   MRN:  [id number]     Requesting Physician Anderson Rivero MD   Reason for Consult:  Medical management. PCP:  Sonia Cameron MD     Date/Time:  2017 7:37 PM       Assessment and Plan:      Knee pain, left / Debilitated patient - POA, they have a concern for infection, and may plan I&D in AM.  They started Jordyn Khan is attending to this issue. Fall precautions, and PT/OT eval in AM    UTI (urinary tract infection) / Hx uterine fibroid / Leukocytosis  - Abnormal UA in ER. Cx sent. No symptoms, beyond leukocytosis. Continue empiric vanco and cipro, given for other reasons, awaiting cx result. Crohn's disease of both small and large intestine without complication - With recent diverticulitis. She should complete Cipro and Flagyl. She states she called her GI doctor, who told her to stop Flagyl, because is can cause B12 deficiency, which can cause neuropathic pain. I see no logic in that. Consult GI to comment. Essential hypertension - continue losartan. Anemia - Mild, stable, check B12    Obese - Advise weight loss        Subjective:     CHIEF COMPLAINT: L knee pain. HISTORY OF PRESENT ILLNESS:     Ms. Paula Manzanares is a 50 y.o.  female who is admitted to the Ortho Service with left knee pain. We are asked to evaluate for medical management. She was discharged a few days ago after recovering from mild diverticulosis. She was doing well, but last night had sharp new L knee pain. Her ER workup was unremarkable, and ortho was called in to tap knee. Ortho admitted her for observation.       Past Medical History:   Diagnosis Date    Crohn's colitis ([facility] [address]     Hypertension     Ill-defined condition     history of  ulcerative colitis        Past Surgical History:   Procedure Laterality Date    COLONOSCOPY N/A 8/14/2017    COLONOSCOPY performed by Caren Chavarria MD at [address] [facility] [place], [place] HX APPENDECTOMY      1993    HX CHOLECYSTECTOMY N/A     HX TONSIL AND ADENOIDECTOMY Bilateral     HX TONSIL AND ADENOIDECTOMY      HX WISDOM TEETH EXTRACTION         Social History   Substance Use Topics    Smoking status: Never Smoker    Smokeless tobacco: Never Used      Comment: some smoking in college    Alcohol use 1.8 oz/week     3 Glasses of wine per week        No family history on file. Allergies   Allergen Reactions    Codeine Nausea and Vomiting    Sulfa (Sulfonamide Antibiotics) Other (comments)     Makes face flush        Prior to Admission medications    Medication Sig Start Date End Date Taking? Authorizing Provider   HYDROcodone-acetaminophen (NORCO) 5-325 mg per tablet Take 1 Tab by mouth every six (6) hours as needed for Pain for up to 5 days. Max Daily Amount: 4 Tabs. 8/19/17 8/24/17 Yes Maynor Peralta NP   ciprofloxacin HCl (CIPRO) 500 mg tablet Take 1 Tab by mouth two (2) times a day for 6 days.  7 day course 8/11-8/17 8/17/17 8/23/17 Yes Philip Matthew MD   losartan (COZAAR) 25 mg tablet TAKE 1 TAB BY MOUTH DAILY FOR 90 DAYS. 7/23/17  Yes Historical Provider         Current Facility-Administered Medications:     ciprofloxacin HCl (CIPRO) tablet 500 mg, 500 mg, Oral, BID, Jason Benavides NP  [address] [place] [address]  [START ON 8/20/2017] losartan (COZAAR) tablet 25 mg, 25 mg, Oral, DAILY, Jason Benavides NP    metroNIDAZOLE (FLAGYL) tablet 500 mg, 500 mg, Oral, TID, Jason Benavides NP    acetaminophen (TYLENOL) tablet 650 mg, 650 mg, Oral, H6D PRN, Jason Benavides NP    diphenhydrAMINE (BENADRYL) injection 12.5 mg, 12.5 mg, IntraVENous, E5O PRN, Jason Benavides NP    morphine injection 2 mg, 2 mg, IntraVENous, Z0G PRN, Jason Benavides NP    ondansetron [facility]) injection 4 mg, 4 mg, IntraVENous, Z5T PRN, Jason Benavides NP    sodium chloride (NS) flush 5-10 mL, 5-10 mL, IntraVENous, I7L, Jason Benavides, DANAE  [address] [place] [address] sodium chloride (NS) flush 5-10 mL, 5-10 mL, IntraVENous, X7X, Yelena Ennis NP    sodium chloride (NS) flush 5-10 mL, 5-10 mL, IntraVENous, PRN, Yelena Ennis NP    oxyCODONE-acetaminophen (PERCOCET) 5-325 mg per tablet 1 Tab, 1 Tab, Oral, V4Y PRN, Yelena Ennis NP, 1 Tab at 08/19/17 1933    naloxone [facility]) injection 0.4 mg, 0.4 mg, IntraVENous, PRN, Yelena Ennis NP    diphenhydrAMINE (BENADRYL) capsule 25 mg, 25 mg, Oral, P1H PRN, Yelena Ennis NP    ondansetron (ZOFRAN ODT) tablet 4 mg, 4 mg, Oral, V2C PRN, Yelena Ennis NP    docusate sodium (COLACE) capsule 100 mg, 100 mg, Oral, BID, Yelena Ennis NP    enoxaparin (LOVENOX) injection 40 mg, 40 mg, SubCUTAneous, W23M, Yelena Ennis NP    naproxen (NAPROSYN) tablet 500 mg, 500 mg, Oral, BID WITH MEALS, Yelena Ennis NP, 336 mg at 08/19/17 1646    cefTRIAXone (ROCEPHIN) 1 g in 0.9% sodium chloride (MBP/ADV) 50 mL, 1 g, IntraVENous, Q24H, Radha MD Susanne    Review of Systems:  (bold if positive, if negative)    Gen:  Eyes:  ENT:  CVS:  Pulm:  GI:    :    MS:  Pain, weakness, arthritisSkin:  Psych:  Endo:    Hem:  Renal:    Neuro:            Objective:      VITALS:    Vital signs reviewed; most recent are:    Visit Vitals    /84 (BP 1 Location: Left arm, BP Patient Position: At rest;Head of bed elevated (Comment degrees))    Pulse 98    Temp 99.5 °F (37.5 °C)    Resp 16    Ht 5' 6\" (1.676 m)    Wt 95.3 kg (210 lb)    SpO2 100%    BMI 33.89 kg/m2     SpO2 Readings from Last 6 Encounters:   08/19/17 100%   08/17/17 93%   08/11/17 98%   08/03/17 98%   01/04/17 96%          Intake/Output Summary (Last 24 hours) at 08/19/17 1937  Last data filed at 08/19/17 1916   Gross per 24 hour   Intake                0 ml   Output              300 ml   Net             -300 ml      All Micro Results     Procedure Component Value Units Date/Time    Christie Bee [[id number]] Collected:  08/19/17 1230    Order Status:  Completed Specimen:  Knee Fluid Updated:  08/19/17 1820     Special Requests: NO SPECIAL REQUESTS        GRAM STAIN 3+ WBCS SEEN         NO ORGANISMS SEEN       CULTURE, BODY FLUID KENA Castorenaat [[id number]] Collected:  08/19/17 1230    Order Status:  Completed Specimen:  Knee Fluid Updated:  08/19/17 1807    CULTURE, ANAEROBIC [[id number]] Collected:  08/19/17 1230    Order Status:  Completed Specimen:  Knee  Updated:  08/19/17 1807    CULTURE, URINE [[id number]] Collected:  08/19/17 1108    Order Status:  Completed Specimen:  Urine from Clean catch Updated:  08/19/17 1436    CULTURE, URINE [[id number]]     Order Status:  Canceled Specimen:  Urine from Clean catch             Exam:     Physical Exam:    Gen:  Well-developed, well-nourished, in no acute distress  HEENT:  Pink conjunctivae, PERRL, hearing intact to voice, moist mucous membranes  Neck:  Supple, without masses, thyroid non-tender  Resp:  No accessory muscle use, clear breath sounds without wheezes rales or rhonchi  Card:  No murmurs, normal S1, S2 without thrills, bruits or peripheral edema  Abd:  Soft, non-tender, non-distended, normoactive bowel sounds are present, no mass  Lymph:  No cervical or inguinal adenopathy  Musc:  No cyanosis or clubbing  Skin:  No rashes or ulcers, skin turgor is good  Neuro:  Cranial nerves are grossly intact, no focal motor weakness, follows commands appropriately  Psych:  Good insight, oriented to person, place and time, alert       Labs:    Recent Labs      08/19/17   1629   WBC  12.9*   HGB  8.9*   HCT  27.2*   PLT  328     Recent Labs      08/19/17   1629   NA  137   K  4.1   CL  104   CO2  30   GLU  140*   BUN  5*   CREA  0.85   CA  8.5     No results found for: GLUCPOC  No results for input(s): PH, PCO2, PO2, HCO3, FIO2 in the last 72 hours. No results for input(s): INR in the last 72 hours.     No lab exists for component: INREXT    I have reviewed previous records,    Telemetry reviewed:   normal sinus rhythm    Risk of deterioration: medium      Total time spent with patient: 45 minutes                  Care Plan discussed with: Patient, Nursing Staff and >50% of time spent in counseling and coordination of care    Discussed:  Care Plan       ___________________________________________________    Attending Physician: Lupis Gleason MD

## 2017-08-19 NOTE — ED NOTES
Patient ride arrived. Patient states that she does not feel that she can leave due to pain.  Provider notified, will re eval

## 2017-08-19 NOTE — IP AVS SNAPSHOT
[facility] 
 
 
 [address] [place] [address] 
[phone] Patient: Venancio Ratliff MRN: [id number] :1965 Current Discharge Medication List  
  
START taking these medications Dose & Instructions Dispensing Information Comments Morning Noon Evening Bedtime  
 azaTHIOprine 50 mg tablet Commonly known as:  The Pepsi Your last dose was: Your next dose is:    
   
   
 Dose:  150 mg Take 3 Tabs by mouth daily (after breakfast). Quantity:  30 Tab Refills:  0  
     
   
   
   
  
 docusate sodium 100 mg capsule Commonly known as:  Melissa Antis Your last dose was: Your next dose is:    
   
   
 Dose:  100 mg Take 1 Cap by mouth two (2) times a day. Hold for loose stools Quantity:  60 Cap Refills:  2  
     
   
   
   
  
 gabapentin 100 mg capsule Commonly known as:  NEURONTIN Your last dose was: Your next dose is:    
   
   
 Dose:  100 mg Take 1 Cap by mouth three (3) times daily. Quantity:  90 Cap Refills:  0  
     
   
   
   
  
 mesalamine 400 mg Cdti capsule Commonly known as:  DELZICOL Your last dose was: Your next dose is:    
   
   
 Dose:  800 mg Take 2 Caps by mouth three (3) times daily. Quantity:  90 Cap Refills:  0  
     
   
   
   
  
 methylPREDNISolone 4 mg tablet Commonly known as:  Ambrosio Labella Your last dose was: Your next dose is: As directed. Quantity:  1 Dose Pack Refills:  0  
     
   
   
   
  
 nystatin 100,000 unit/mL suspension Commonly known as:  MYCOSTATIN Your last dose was: Your next dose is:    
   
   
 Dose:  [id number] Units Take 5 mL by mouth four (4) times daily for 5 days. swish and spit Quantity:  100 mL Refills:  0  
     
   
   
   
  
 ondansetron 4 mg disintegrating tablet Commonly known as:  ZOFRAN ODT Your last dose was: Your next dose is: Dose:  4 mg Take 1 Tab by mouth every four (4) hours as needed. Quantity:  30 Tab Refills:  0  
     
   
   
   
  
 * OTHER(NON-FORMULARY) Your last dose was: Your next dose is:    
   
   
 Daptomycin 600mg IV daily through 10/19/17 Quantity:  1 Each Refills:  0  
     
   
   
   
  
 * OTHER(NON-FORMULARY) Your last dose was: Your next dose is:    
   
   
 Invanz 1gm IV daily through 10/19/17 Quantity:  1 Each Refills:  0  
     
   
   
   
  
 oxyCODONE IR 5 mg immediate release tablet Commonly known as:  Ada Stover Your last dose was: Your next dose is:    
   
   
 Dose:  5 mg Take 1 Tab by mouth every four (4) hours as needed for up to 7 days. Max Daily Amount: 30 mg.  
 Quantity:  40 Tab Refills:  0  
     
   
   
   
  
 polyethylene glycol 17 gram packet Commonly known as:  Leata Sans Your last dose was: Your next dose is:    
   
   
 Dose:  17 g Take 1 Packet by mouth daily. HOLD for loose stools Quantity:  30 Each Refills:  0  
     
   
   
   
  
 * Notice: This list has 2 medication(s) that are the same as other medications prescribed for you. Read the directions carefully, and ask your doctor or other care provider to review them with you. CONTINUE these medications which have NOT CHANGED Dose & Instructions Dispensing Information Comments Morning Noon Evening Bedtime  
 losartan 25 mg tablet Commonly known as:  COZAAR Your last dose was: Your next dose is: TAKE 1 TAB BY MOUTH DAILY FOR 90 DAYS. Refills:  1 STOP taking these medications   
 ciprofloxacin HCl 500 mg tablet Commonly known as:  CIPRO Where to Get Your Medications Information on where to get these meds will be given to you by the nurse or doctor. ! Ask your nurse or doctor about these medications  
  azaTHIOprine 50 mg tablet docusate sodium 100 mg capsule  
 gabapentin 100 mg capsule  
 mesalamine 400 mg Cdti capsule  
 methylPREDNISolone 4 mg tablet  
 nystatin 100,000 unit/mL suspension  
 ondansetron 4 mg disintegrating tablet OTHER(NON-FORMULARY) OTHER(NON-FORMULARY)  
 oxyCODONE IR 5 mg immediate release tablet  
 polyethylene glycol 17 gram packet

## 2017-08-19 NOTE — ED NOTES
Patient continues to refuses to leave ED despite discharge. Patient assisted back to stretcher from wheelchair with three staff members, patient able to stand on right foot with little other assistance offered by patient. Nursing supervisor and  on call notified per MD request. Supervisor will come speak with patient, MD speaking with .

## 2017-08-19 NOTE — CONSULTS
NAME: Zaina Vasquez       :  1965       MRN:  [id number]      Subjective:     Patient is a 50 y.o.  female who presents with history of diverticulitis, crohn's colitis currently on PO abx whom presents to the ED for left knee pain. Pt. Had recent admission for crohn's flair up also at the same time had right knee pain, it was tapped (negative for infection), given a cortisone injection and knee pain improved. Pt. Was sent home and last night her left knee started to cause extreme discomfort. She was unable to ambulate due to severe knee pain and called EMS to bring her to ED. Xrays show mild OA, no fracture, with large effusion. Ortho consulted for evaluation. Patient Active Problem List    Diagnosis Date Noted    BRBPR (bright red blood per rectum) 2017    Diverticulitis 2017    Uterine leiomyoma , 2017    Crohn's colitis ([address] fissure 2007 2017    Essential hypertension 2017    Crohn's disease of both small and large intestine without complication ([address]      Past Medical History:   Diagnosis Date    Crohn's colitis ([address]     Hypertension     Ill-defined condition     history of  ulcerative colitis      Past Surgical History:   Procedure Laterality Date    COLONOSCOPY N/A 2017    COLONOSCOPY performed by Rafal Madison MD at [address] [facility] HX CHOLECYSTECTOMY N/A     HX TONSIL AND ADENOIDECTOMY Bilateral     HX TONSIL AND ADENOIDECTOMY      HX WISDOM TEETH EXTRACTION        Prior to Admission medications    Medication Sig Start Date End Date Taking? Authorizing Provider   HYDROcodone-acetaminophen (NORCO) 5-325 mg per tablet Take 1 Tab by mouth every six (6) hours as needed for Pain for up to 5 days. Max Daily Amount: 4 Tabs. 17 Yes Rhea Peralta NP   ciprofloxacin HCl (CIPRO) 500 mg tablet Take 1 Tab by mouth two (2) times a day for 6 days.  7 day course -17 17  Philip Argueta MD   metroNIDAZOLE (FLAGYL) 500 mg tablet Take 1 Tab by mouth three (3) times daily for 6 days. 17  Philip Argueta MD   losartan (COZAAR) 25 mg tablet TAKE 1 TAB BY MOUTH DAILY FOR 90 DAYS. 17   Historical Provider     No current facility-administered medications for this encounter. Current Outpatient Prescriptions   Medication Sig    HYDROcodone-acetaminophen (NORCO) 5-325 mg per tablet Take 1 Tab by mouth every six (6) hours as needed for Pain for up to 5 days. Max Daily Amount: 4 Tabs.  ciprofloxacin HCl (CIPRO) 500 mg tablet Take 1 Tab by mouth two (2) times a day for 6 days. 7 day course -    metroNIDAZOLE (FLAGYL) 500 mg tablet Take 1 Tab by mouth three (3) times daily for 6 days.  losartan (COZAAR) 25 mg tablet TAKE 1 TAB BY MOUTH DAILY FOR 90 DAYS. Allergies   Allergen Reactions    Codeine Nausea and Vomiting    Sulfa (Sulfonamide Antibiotics) Other (comments)     Makes face flush      Social History   Substance Use Topics    Smoking status: Never Smoker    Smokeless tobacco: Never Used      Comment: some smoking in college    Alcohol use 1.8 oz/week     3 Glasses of wine per week      No family history on file. Review of Systems  A comprehensive review of systems was negative except for that written in the HPI.         Objective:     Patient Vitals for the past 8 hrs:   BP Temp Pulse Resp SpO2 Height Weight   17 1554 119/72 - - - 100 % - -   17 1400 123/69 - - - 100 % - -   17 1330 111/68 - - - 99 % - -   17 1300 107/64 - - - 99 % - -   17 1230 94/49 - - - 100 % - -   17 1130 103/62 - - - 100 % - -   17 1123 101/57 - - - 100 % - -   17 1045 106/47 - - - 100 % - -   17 1030 116/63 - - - 100 % - -   17 1023 123/64 98 °F (36.7 °C) 74 15 100 % 5' 6\" (1.676 m) 95.3 kg (210 lb)   08/19/17 1022 123/64 - - - (!) 81 % - -     Temp (24hrs), Av °F (36.7 °C), Min:98 °F (36.7 °C), Max:98 °F (36.7 °C)      Gen: Well-developed, obese, in no acute distress   HEENT: Pink conjunctivae, PERRL, hearing intact to voice, moist mucous membranes   Neck: Supple, without masses, thyroid non-tender   Resp: No accessory muscle use, clear breath sounds without wheezes rales or rhonchi   Card: No murmurs, normal S1, S2 without thrills, bruits or peripheral edema   Abd: Soft, non-tender, non-distended, normoactive bowel sounds are present, no palpable organomegaly and no detectable hernias   Lymph: No cervical or inguinal adenopathy   Musc: Left knee with large effusion. Tender to palpation, Unable to flex or extend knee due to severe pain. No crepitus with ROM. PP +2  Skin:  Clean, dry, intact. No rash, ulcerations. Skin turgor good  Neuro: Cranial nerves are grossly intact, no focal motor weakness, follows commands appropriately   Psych: Good insight, oriented to person, place and time, alert                Imaging Review    Imaging Review: Left knee with large effusion/ OA. Labs:   Recent Results (from the past 24 hour(s))   URINALYSIS W/MICROSCOPIC    Collection Time: 08/19/17 11:08 AM   Result Value Ref Range    Color YELLOW/STRAW      Appearance CLOUDY (A) CLEAR      Specific gravity 1.016 1.003 - 1.030      pH (UA) 6.5 5.0 - 8.0      Protein NEGATIVE  NEG mg/dL    Glucose NEGATIVE  NEG mg/dL    Ketone NEGATIVE  NEG mg/dL    Bilirubin NEGATIVE  NEG      Blood MODERATE (A) NEG      Urobilinogen 0.2 0.2 - 1.0 EU/dL    Nitrites NEGATIVE  NEG      Leukocyte Esterase LARGE (A) NEG      WBC 10-20 0 - 4 /hpf    RBC 0-5 0 - 5 /hpf    Epithelial cells FEW FEW /lpf    Bacteria 1+ (A) NEG /hpf   URIC ACID, UR, RANDOM    Collection Time: 08/19/17 11:08 AM   Result Value Ref Range    Uric Acid,urine random 42.7 MG/DL     Procedure: Procedure: After verbal consent for aspiration and under sterile conditions, I aspirated 50 ml of cloudy yellow fluid from the left knee .  Fluid was sent to lab for Gram stain, Culture and cell count. Dressing applied. Assessment:     Patient Active Problem List    Diagnosis Date Noted    BRBPR (bright red blood per rectum) 08/13/2017    Diverticulitis 08/12/2017    Uterine leiomyoma 2010, 2015 08/03/2017    Crohn's colitis ([facility] [address] fissure 2007 08/03/2017    Essential hypertension 01/04/2017    Crohn's disease of both small and large intestine without complication ([facility] [address] 16/51/3681         Plan:   Severe left knee pain with effusion. Aspirate sent to lab for evaluation. Due to severity of pain, patient was unable to safely ambulate with knee immobilizer and crutches. Will admit patient for observation , follow labs, pain control, PT/ OT  Consult medicine for recent chrons flair and abx management. NPO after MN in case need for wash out. Dr. August Mccloud aware and agrees with above plan.      Tirso Dutta NP

## 2017-08-19 NOTE — PROGRESS NOTES
[facility]: MED RECONCILIATION    Comments/Recommendations:   · Verified allergies as documented. · Med rec completed with patient who was alert and oriented  · She reported that she recently prescribed ciprofloxacin and metronidazole for acute diverticulitis and has not missed any doses of the antibiotics. · On this past Thursday post discharge from [facility], she was told to continue PO ABX regimen x 20 days more - Cipro and Flagyl. However, she called the on-call physician this morning due to her pain and reports that she was told to stop taking flagyl. Medications added:     · None    Medications removed:    · Flagyl    Medications adjusted:    · None    Information obtained from: Patient    Allergies: Codeine and Sulfa (sulfonamide antibiotics)    Prior to Admission Medications:   Prior to Admission Medications   Prescriptions Last Dose Informant Patient Reported? Taking?   ciprofloxacin HCl (CIPRO) 500 mg tablet 8/18/2017 at Unknown time Self No Yes   Sig: Take 1 Tab by mouth two (2) times a day for 6 days. 7 day course 8/11-8/17   losartan (COZAAR) 25 mg tablet 8/18/2017 at AM Self Yes Yes   Sig: TAKE 1 TAB BY MOUTH DAILY FOR 90 DAYS.       Facility-Administered Medications: None         Thank you,  Elise Prince, PharmD     Contact: [phone]

## 2017-08-19 NOTE — LETTER
[address] 
[facility] EMERGENCY DEPT 
[address] [facility] [place] [id number] [phone] Work/School Note Date: 8/19/2017 To Whom It May concern: 
 
Asmita Hong was seen and treated today in the emergency room by the following provider(s): 
Attending Provider: Janie Jordan MD 
Nurse Practitioner: Hetal Alvares NP. Asmita Hong may return to work on 8/22/17.  
 
Sincerely, 
 
 
 
 
Hetal Alvares NP

## 2017-08-19 NOTE — ED NOTES
TRANSFER - OUT REPORT:    Verbal report given to Justin Seo RN(name) on Nico Godfrey  being transferred to room 413(unit) for routine progression of care       Report consisted of patients Situation, Background, Assessment and   Recommendations(SBAR). Information from the following report(s) SBAR was reviewed with the receiving nurse. Lines:   Peripheral IV 08/19/17 Left Antecubital (Active)   Site Assessment Clean, dry, & intact 8/19/2017  4:38 PM   Phlebitis Assessment 0 8/19/2017  4:38 PM   Infiltration Assessment 0 8/19/2017  4:38 PM   Dressing Status Clean, dry, & intact 8/19/2017  4:38 PM        Opportunity for questions and clarification was provided.       Patient transported with:   [employer]

## 2017-08-20 ENCOUNTER — ANESTHESIA (OUTPATIENT)
Dept: SURGERY | Age: 52
DRG: 488 | End: 2017-08-20
Payer: COMMERCIAL

## 2017-08-20 ENCOUNTER — ANESTHESIA EVENT (OUTPATIENT)
Dept: SURGERY | Age: 52
DRG: 488 | End: 2017-08-20
Payer: COMMERCIAL

## 2017-08-20 LAB
APPEARANCE FLD: ABNORMAL
COLOR FLD: YELLOW
CREAT SERPL-MCNC: 0.82 MG/DL (ref 0.55–1.02)
LYMPHOCYTES NFR FLD: 17 %
MONOS+MACROS NFR FLD: 13 %
NEUTROPHILS NFR FLD: 70 %
NUC CELL # FLD: ABNORMAL /CU MM (ref 0–5)
RBC # FLD: >100 /CU MM
SPECIMEN SOURCE FLD: ABNORMAL

## 2017-08-20 PROCEDURE — 87491 CHLMYD TRACH DNA AMP PROBE: CPT | Performed by: EMERGENCY MEDICINE

## 2017-08-20 PROCEDURE — 82565 ASSAY OF CREATININE: CPT | Performed by: NURSE PRACTITIONER

## 2017-08-20 PROCEDURE — 74011250636 HC RX REV CODE- 250/636: Performed by: NURSE PRACTITIONER

## 2017-08-20 PROCEDURE — 99218 HC RM OBSERVATION: CPT

## 2017-08-20 PROCEDURE — 74011000250 HC RX REV CODE- 250

## 2017-08-20 PROCEDURE — 76010000149 HC OR TIME 1 TO 1.5 HR: Performed by: ORTHOPAEDIC SURGERY

## 2017-08-20 PROCEDURE — 74011250637 HC RX REV CODE- 250/637: Performed by: NURSE PRACTITIONER

## 2017-08-20 PROCEDURE — 74011250636 HC RX REV CODE- 250/636

## 2017-08-20 PROCEDURE — 76210000063 HC OR PH I REC FIRST 0.5 HR: Performed by: ORTHOPAEDIC SURGERY

## 2017-08-20 PROCEDURE — 87075 CULTR BACTERIA EXCEPT BLOOD: CPT | Performed by: ORTHOPAEDIC SURGERY

## 2017-08-20 PROCEDURE — 77030028224 HC PDNG CST BSNM -A: Performed by: ORTHOPAEDIC SURGERY

## 2017-08-20 PROCEDURE — 77030002933 HC SUT MCRYL J&J -A: Performed by: ORTHOPAEDIC SURGERY

## 2017-08-20 PROCEDURE — 87205 SMEAR GRAM STAIN: CPT | Performed by: ORTHOPAEDIC SURGERY

## 2017-08-20 PROCEDURE — 77030018835 HC SOL IRR LR ICUM -A: Performed by: ORTHOPAEDIC SURGERY

## 2017-08-20 PROCEDURE — 89050 BODY FLUID CELL COUNT: CPT | Performed by: ORTHOPAEDIC SURGERY

## 2017-08-20 PROCEDURE — 36415 COLL VENOUS BLD VENIPUNCTURE: CPT | Performed by: NURSE PRACTITIONER

## 2017-08-20 PROCEDURE — 0SBD4ZZ EXCISION OF LEFT KNEE JOINT, PERCUTANEOUS ENDOSCOPIC APPROACH: ICD-10-PCS | Performed by: ORTHOPAEDIC SURGERY

## 2017-08-20 PROCEDURE — 77030008496 HC TBNG ARTHSC IRR S&N -B: Performed by: ORTHOPAEDIC SURGERY

## 2017-08-20 PROCEDURE — 76060000033 HC ANESTHESIA 1 TO 1.5 HR: Performed by: ORTHOPAEDIC SURGERY

## 2017-08-20 PROCEDURE — 77030013079 HC BLNKT BAIR HGGR 3M -A: Performed by: ANESTHESIOLOGY

## 2017-08-20 PROCEDURE — 77030020143 HC AIRWY LARYN INTUB CGAS -A: Performed by: ANESTHESIOLOGY

## 2017-08-20 RX ORDER — ONDANSETRON 2 MG/ML
INJECTION INTRAMUSCULAR; INTRAVENOUS AS NEEDED
Status: DISCONTINUED | OUTPATIENT
Start: 2017-08-20 | End: 2017-08-20 | Stop reason: HOSPADM

## 2017-08-20 RX ORDER — SODIUM CHLORIDE, SODIUM LACTATE, POTASSIUM CHLORIDE, CALCIUM CHLORIDE 600; 310; 30; 20 MG/100ML; MG/100ML; MG/100ML; MG/100ML
100 INJECTION, SOLUTION INTRAVENOUS CONTINUOUS
Status: DISCONTINUED | OUTPATIENT
Start: 2017-08-20 | End: 2017-08-20 | Stop reason: HOSPADM

## 2017-08-20 RX ORDER — FENTANYL CITRATE 50 UG/ML
INJECTION, SOLUTION INTRAMUSCULAR; INTRAVENOUS AS NEEDED
Status: DISCONTINUED | OUTPATIENT
Start: 2017-08-20 | End: 2017-08-20 | Stop reason: HOSPADM

## 2017-08-20 RX ORDER — SODIUM CHLORIDE 0.9 % (FLUSH) 0.9 %
5-10 SYRINGE (ML) INJECTION EVERY 8 HOURS
Status: DISCONTINUED | OUTPATIENT
Start: 2017-08-20 | End: 2017-08-20 | Stop reason: HOSPADM

## 2017-08-20 RX ORDER — MIDAZOLAM HYDROCHLORIDE 1 MG/ML
INJECTION, SOLUTION INTRAMUSCULAR; INTRAVENOUS AS NEEDED
Status: DISCONTINUED | OUTPATIENT
Start: 2017-08-20 | End: 2017-08-20 | Stop reason: HOSPADM

## 2017-08-20 RX ORDER — SODIUM CHLORIDE 0.9 % (FLUSH) 0.9 %
5-10 SYRINGE (ML) INJECTION AS NEEDED
Status: DISCONTINUED | OUTPATIENT
Start: 2017-08-20 | End: 2017-08-20 | Stop reason: HOSPADM

## 2017-08-20 RX ORDER — LABETALOL HYDROCHLORIDE 5 MG/ML
INJECTION, SOLUTION INTRAVENOUS AS NEEDED
Status: DISCONTINUED | OUTPATIENT
Start: 2017-08-20 | End: 2017-08-20 | Stop reason: HOSPADM

## 2017-08-20 RX ORDER — PROPOFOL 10 MG/ML
INJECTION, EMULSION INTRAVENOUS AS NEEDED
Status: DISCONTINUED | OUTPATIENT
Start: 2017-08-20 | End: 2017-08-20 | Stop reason: HOSPADM

## 2017-08-20 RX ORDER — LIDOCAINE HYDROCHLORIDE 10 MG/ML
0.1 INJECTION, SOLUTION EPIDURAL; INFILTRATION; INTRACAUDAL; PERINEURAL AS NEEDED
Status: DISCONTINUED | OUTPATIENT
Start: 2017-08-20 | End: 2017-08-20 | Stop reason: HOSPADM

## 2017-08-20 RX ORDER — HYDROMORPHONE HYDROCHLORIDE 2 MG/ML
INJECTION, SOLUTION INTRAMUSCULAR; INTRAVENOUS; SUBCUTANEOUS AS NEEDED
Status: DISCONTINUED | OUTPATIENT
Start: 2017-08-20 | End: 2017-08-20 | Stop reason: HOSPADM

## 2017-08-20 RX ORDER — HYDROMORPHONE HYDROCHLORIDE 1 MG/ML
.25-1 INJECTION, SOLUTION INTRAMUSCULAR; INTRAVENOUS; SUBCUTANEOUS
Status: DISCONTINUED | OUTPATIENT
Start: 2017-08-20 | End: 2017-08-20 | Stop reason: HOSPADM

## 2017-08-20 RX ORDER — LIDOCAINE HYDROCHLORIDE 20 MG/ML
INJECTION, SOLUTION EPIDURAL; INFILTRATION; INTRACAUDAL; PERINEURAL AS NEEDED
Status: DISCONTINUED | OUTPATIENT
Start: 2017-08-20 | End: 2017-08-20 | Stop reason: HOSPADM

## 2017-08-20 RX ORDER — SODIUM CHLORIDE, SODIUM LACTATE, POTASSIUM CHLORIDE, CALCIUM CHLORIDE 600; 310; 30; 20 MG/100ML; MG/100ML; MG/100ML; MG/100ML
INJECTION, SOLUTION INTRAVENOUS
Status: DISCONTINUED | OUTPATIENT
Start: 2017-08-20 | End: 2017-08-20 | Stop reason: HOSPADM

## 2017-08-20 RX ADMIN — Medication 10 ML: at 05:37

## 2017-08-20 RX ADMIN — MIDAZOLAM HYDROCHLORIDE 2 MG: 1 INJECTION, SOLUTION INTRAMUSCULAR; INTRAVENOUS at 08:56

## 2017-08-20 RX ADMIN — VANCOMYCIN HYDROCHLORIDE 1500 MG: 10 INJECTION, POWDER, LYOPHILIZED, FOR SOLUTION INTRAVENOUS at 10:22

## 2017-08-20 RX ADMIN — FENTANYL CITRATE 50 MCG: 50 INJECTION, SOLUTION INTRAMUSCULAR; INTRAVENOUS at 09:05

## 2017-08-20 RX ADMIN — SODIUM CHLORIDE, SODIUM LACTATE, POTASSIUM CHLORIDE, CALCIUM CHLORIDE: 600; 310; 30; 20 INJECTION, SOLUTION INTRAVENOUS at 08:30

## 2017-08-20 RX ADMIN — ENOXAPARIN SODIUM 40 MG: 40 INJECTION SUBCUTANEOUS at 17:30

## 2017-08-20 RX ADMIN — LABETALOL HYDROCHLORIDE 10 MG: 5 INJECTION, SOLUTION INTRAVENOUS at 09:28

## 2017-08-20 RX ADMIN — HYDROMORPHONE HYDROCHLORIDE 1 MG: 2 INJECTION, SOLUTION INTRAMUSCULAR; INTRAVENOUS; SUBCUTANEOUS at 09:53

## 2017-08-20 RX ADMIN — Medication 2 MG: at 02:09

## 2017-08-20 RX ADMIN — FENTANYL CITRATE 50 MCG: 50 INJECTION, SOLUTION INTRAMUSCULAR; INTRAVENOUS at 09:14

## 2017-08-20 RX ADMIN — HYDROMORPHONE HYDROCHLORIDE 1 MG: 2 INJECTION, SOLUTION INTRAMUSCULAR; INTRAVENOUS; SUBCUTANEOUS at 09:12

## 2017-08-20 RX ADMIN — Medication 10 ML: at 14:00

## 2017-08-20 RX ADMIN — ONDANSETRON 4 MG: 2 INJECTION INTRAMUSCULAR; INTRAVENOUS at 09:33

## 2017-08-20 RX ADMIN — Medication 2 MG: at 14:08

## 2017-08-20 RX ADMIN — OXYCODONE HYDROCHLORIDE AND ACETAMINOPHEN 1 TABLET: 5; 325 TABLET ORAL at 19:34

## 2017-08-20 RX ADMIN — FENTANYL CITRATE 50 MCG: 50 INJECTION, SOLUTION INTRAMUSCULAR; INTRAVENOUS at 09:11

## 2017-08-20 RX ADMIN — Medication 2 MG: at 05:36

## 2017-08-20 RX ADMIN — LIDOCAINE HYDROCHLORIDE 100 MG: 20 INJECTION, SOLUTION EPIDURAL; INFILTRATION; INTRACAUDAL; PERINEURAL at 09:05

## 2017-08-20 RX ADMIN — CIPROFLOXACIN HYDROCHLORIDE 500 MG: 500 TABLET, FILM COATED ORAL at 17:30

## 2017-08-20 RX ADMIN — DOCUSATE SODIUM 100 MG: 100 CAPSULE ORAL at 17:31

## 2017-08-20 RX ADMIN — FENTANYL CITRATE 50 MCG: 50 INJECTION, SOLUTION INTRAMUSCULAR; INTRAVENOUS at 09:22

## 2017-08-20 RX ADMIN — VANCOMYCIN HYDROCHLORIDE 1500 MG: 10 INJECTION, POWDER, LYOPHILIZED, FOR SOLUTION INTRAVENOUS at 22:07

## 2017-08-20 RX ADMIN — Medication 10 ML: at 22:00

## 2017-08-20 RX ADMIN — Medication 10 ML: at 22:07

## 2017-08-20 RX ADMIN — FENTANYL CITRATE 50 MCG: 50 INJECTION, SOLUTION INTRAMUSCULAR; INTRAVENOUS at 09:08

## 2017-08-20 RX ADMIN — OXYCODONE HYDROCHLORIDE AND ACETAMINOPHEN 1 TABLET: 5; 325 TABLET ORAL at 23:38

## 2017-08-20 RX ADMIN — PROPOFOL 200 MG: 10 INJECTION, EMULSION INTRAVENOUS at 09:05

## 2017-08-20 RX ADMIN — NAPROXEN 500 MG: 250 TABLET ORAL at 17:18

## 2017-08-20 NOTE — ANESTHESIA PREPROCEDURE EVALUATION
Anesthetic History   No history of anesthetic complications            Review of Systems / Medical History  Patient summary reviewed and pertinent labs reviewed    Pulmonary  Within defined limits                 Neuro/Psych   Within defined limits           Cardiovascular    Hypertension                   GI/Hepatic/Renal               Comments: Chron's, diverticulitis Endo/Other        Anemia    Comments: Blood loss anemia Other Findings              Physical Exam    Airway  Mallampati: II  TM Distance: 4 - 6 cm  Neck ROM: normal range of motion   Mouth opening: Normal     Cardiovascular  Regular rate and rhythm,  S1 and S2 normal,  no murmur, click, rub, or gallop  Rhythm: regular  Rate: normal         Dental  No notable dental hx       Pulmonary  Breath sounds clear to auscultation               Abdominal  GI exam deferred       Other Findings            Anesthetic Plan    ASA: 2  Anesthesia type: general            Anesthetic plan and risks discussed with: Patient

## 2017-08-20 NOTE — ROUTINE PROCESS
Orthopedic & Surgical Nursing Communication Tool      8:22 AM  8/20/2017     Bedside and Verbal shift change report given to Skip Gonzales RN (incoming nurse) by Reza Alvarado RN (outgoing nurse) on Gian Israel a 50 y.o. female who was admitted on 8/19/2017 10:14 AM. Report included the following information SBAR, Kardex, Intake/Output, MAR, Accordion, Recent Results and Med Rec Status. Pt resting. No signs of distress         Opportunity for questions and clarifications were given to the incoming nurse. Patient's bed is in low position, side rails x2, door open PRN, call bell within reach and patient not in distress.     Reza Alvarado RN

## 2017-08-20 NOTE — ANESTHESIA POSTPROCEDURE EVALUATION
Post-Anesthesia Evaluation and Assessment    Patient: Sage Matthews MRN: [id number]  SSN: [id number]    YOB: 1965  Age: 50 y.o. Sex: female       Cardiovascular Function/Vital Signs  Visit Vitals    /87    Pulse 90    Temp 37.2 °C (98.9 °F)    Resp 12    Ht 5' 6\" (1.676 m)    Wt 95.3 kg (210 lb)    SpO2 98%    BMI 33.89 kg/m2       Patient is status post general anesthesia for Procedure(s):  KNEE ARTHROSCOPY INCISION AND DRAINAGE OF LEFT AND RIGHT KNEE. Nausea/Vomiting: None    Postoperative hydration reviewed and adequate. Pain:  Pain Scale 1: Visual (08/20/17 1014)  Pain Intensity 1: 0 (08/20/17 1014)   Managed    Neurological Status:   Neuro (WDL): Exceptions to WDL (08/20/17 1014)  Neuro  Neurologic State: Drowsy (08/20/17 1014)  LLE Motor Response: Purposeful;Weak (08/19/17 2111)  RLE Motor Response: Purposeful;Weak (08/19/17 2111)   At baseline    Mental Status and Level of Consciousness: Arousable    Pulmonary Status:   O2 Device: Nasal cannula (08/20/17 1018)   Adequate oxygenation and airway patent    Complications related to anesthesia: None    Post-anesthesia assessment completed.  No concerns    Signed By: Clyde Sosa MD     August 20, 2017

## 2017-08-20 NOTE — PERIOP NOTES
TRANSFER - OUT REPORT:    Verbal report given to Fredis Kapadia RN on Deep Alejandro  being transferred to [phone] for routine post - op       Report consisted of patients Situation, Background, Assessment and   Recommendations(SBAR). Information from the following report(s) SBAR, Kardex and MAR was reviewed with the receiving nurse. Opportunity for questions and clarification was provided.       Patient transported with:   Registered Nurse

## 2017-08-20 NOTE — PROGRESS NOTES
[facility] Dosing Services: Antimicrobial Stewardship Progress Note    Consult for antibiotic dosing of Vancomycin by Zuleima Acosta NP  Pharmacist reviewed antibiotic appropriateness for 50year old , female  for indication of Bone and Joint infection  Day of Therapy 1    Plan:  Vancomycin therapy:  Start Vancomycin therapy, with loading dose of 2500 (mg) at 2200. Follow with maintenance dose of 1500 (mg) every 12 hours. Dose calculated to approximate a therapeutic trough of 15 - 20 mcg/mL. Last trough level / Plan for level: Prior to 4th dose  Pharmacy to follow daily and will make changes to dose and/or frequency based on clinical status. Other Antimicrobial  (not dosed by pharmacist)   Flagyl 500 mg PO TID  Cipro 500 mg PO BID   Cultures     8/19 gram stain: Pend  8/19 Knee: Pend  8/19 Anaerobic: Pend  8/19 Urine: Pend  8/15 synovial fluid: NGTD (Pend)     Serum Creatinine     Lab Results   Component Value Date/Time    Creatinine 0.79 08/19/2017 08:04 PM       Creatinine Clearance Estimated Creatinine Clearance: 99.1 mL/min (based on Cr of 0.79).      Temp   99 °F (37.2 °C)    WBC   Lab Results   Component Value Date/Time    WBC 14.1 08/19/2017 08:04 PM       H/H   Lab Results   Component Value Date/Time    HGB 8.4 08/19/2017 08:04 PM        Platelets   Lab Results   Component Value Date/Time    PLATELET 137 34/21/3731 08:04 PM          Thank you,  James Decker, PharmD     Contact: [phone]

## 2017-08-20 NOTE — ROUTINE PROCESS
Orthopedic & Surgical Nursing Communication Tool      8:07 PM  8/19/2017     Bedside and Verbal shift change report given to Ruby Olsen RN (incoming nurse) by Marcy Prajapati RN (outgoing nurse) on Laura Cedeno a 50 y.o. female who was admitted on 8/19/2017 10:14 AM. Report included the following information SBAR, Kardex and MAR. Opportunity for questions and clarifications were given to the incoming nurse. Patient's bed is in low position, side rails x2, door open PRN, call bell within reach and patient not in distress.     Marcy Prajapati RN

## 2017-08-20 NOTE — PROGRESS NOTES
Ortho update:    Labs reviewed from arthrocentesis: nucleated WBC from tap 50K , which is concerning for septic arthritis in native knee. Gram stain pending  Labs reviewed, elevated sed rate, CRP with leukocytosis (+UA in addition)  Discussed with Dr. Kane Ram. Plan for I and D of left knee tomorrow late morning. To start Vanc now. Discussed with patient. NPO after MN. Consents to be signed.     Gerardo Mccartney, DANAE

## 2017-08-20 NOTE — PROGRESS NOTES
[facility] [address]  [address], [place], [address] [facility]  [phone]      Medical Progress Note      NAME: Zoya Ayala   :  1965  MRM:  [id number]    Date/Time: 2017  10:23 AM       Assessment and Plan:   1. Septic LT knee/ knee pain. S/P I&D. continue ABx and follow cultures. Ortho is attending to this issue. Fall precautions, and PT/OT      2.  UTI (urinary tract infection) / Hx uterine fibroid / Leukocytosis  - No symptoms, beyond leukocytosis. Continue empiric vanco and cipro, which was started for septic joint.       3. Crohn's disease of both small and large intestine with recent diverticulitis. Recently discharged on cipro and flagyl for 20 days. She states she called her GI doctor, who told her to stop Flagyl, because it can cause B12 deficiency, which can cause neuropathic pain. Consult GI to comment. Plan is to repeat colonoscopy when completing antibiotics to be certain resolution diverticulitis prior to biologic therapy. 4.  Essential hypertension - continue losartan.     5. Anemia - Mild, stable      6. Obese - Advise weight loss    7. Hyperglycemia. Denies Hx of DM. Check A1C          Subjective:     Chief Complaint:  Knee pain. Had I&D    ROS:  (bold if positive, if negative)      Tolerating PT  Tolerating Diet        Objective:     Last 24hrs VS reviewed since prior progress note.  Most recent are:    Visit Vitals    /90    Pulse 98    Temp 98.9 °F (37.2 °C)    Resp 14    Ht 5' 6\" (1.676 m)    Wt 95.3 kg (210 lb)    SpO2 99%    BMI 33.89 kg/m2     SpO2 Readings from Last 6 Encounters:   17 99%   17 93%   17 98%   17 98%   17 96%    O2 Flow Rate (L/min): 3 l/min     Intake/Output Summary (Last 24 hours) at 17 1023  Last data filed at 17 1012   Gross per 24 hour   Intake             1400 ml   Output             1125 ml   Net              275 ml        Physical Exam:    Gen:  Well-developed, well-nourished, in no acute distress  HEENT:  Pink conjunctivae, PERRL, hearing intact to voice, moist mucous membranes  Neck:  Supple, without masses, thyroid non-tender  Resp:  No accessory muscle use, clear breath sounds without wheezes rales or rhonchi  Card:  No murmurs, normal S1, S2 without thrills, bruits or peripheral edema  Abd:  Soft, non-tender, non-distended, normoactive bowel sounds are present, no palpable organomegaly and no detectable hernias  Lymph:  No cervical or inguinal adenopathy  Musc:  No cyanosis or clubbing  Skin:  No rashes or ulcers, skin turgor is good  Neuro:  Cranial nerves are grossly intact, no focal motor weakness, follows commands appropriately  Psych:  Good insight, oriented to person, place and time, alert  __________________________________________________________________  Medications Reviewed: (see below)  Medications:     Current Facility-Administered Medications   Medication Dose Route Frequency    lactated Ringers infusion  100 mL/hr IntraVENous CONTINUOUS    sodium chloride (NS) flush 5-10 mL  5-10 mL IntraVENous PRN    HYDROmorphone (PF) (DILAUDID) injection 0.25-1 mg  0.25-1 mg IntraVENous Q10MIN PRN    ciprofloxacin HCl (CIPRO) tablet 500 mg  500 mg Oral BID    losartan (COZAAR) tablet 25 mg  25 mg Oral DAILY    metroNIDAZOLE (FLAGYL) tablet 500 mg  500 mg Oral TID    acetaminophen (TYLENOL) tablet 650 mg  650 mg Oral Q4H PRN    diphenhydrAMINE (BENADRYL) injection 12.5 mg  12.5 mg IntraVENous Q4H PRN    morphine injection 2 mg  2 mg IntraVENous Q4H PRN    ondansetron (ZOFRAN) injection 4 mg  4 mg IntraVENous Q4H PRN    sodium chloride (NS) flush 5-10 mL  5-10 mL IntraVENous Q8H    sodium chloride (NS) flush 5-10 mL  5-10 mL IntraVENous Q8H    sodium chloride (NS) flush 5-10 mL  5-10 mL IntraVENous PRN    oxyCODONE-acetaminophen (PERCOCET) 5-325 mg per tablet 1 Tab  1 Tab Oral Q4H PRN    naloxone (NARCAN) injection 0.4 mg  0.4 mg IntraVENous PRN    diphenhydrAMINE (BENADRYL) capsule 25 mg  25 mg Oral Q4H PRN    ondansetron (ZOFRAN ODT) tablet 4 mg  4 mg Oral Q4H PRN    docusate sodium (COLACE) capsule 100 mg  100 mg Oral BID    enoxaparin (LOVENOX) injection 40 mg  40 mg SubCUTAneous Q24H    naproxen (NAPROSYN) tablet 500 mg  500 mg Oral BID WITH MEALS    vancomycin (VANCOCIN) 1,500 mg in 0.9% sodium chloride 500 mL IVPB  1,500 mg IntraVENous Q12H        Lab Data Reviewed: (see below)  Lab Review:     Recent Labs      08/19/17 2004 08/19/17   1629   WBC  14.1*  12.9*   HGB  8.4*  8.9*   HCT  26.6*  27.2*   PLT  332  328     Recent Labs      08/20/17   0518  08/19/17 2004 08/19/17   1629   NA   --   140  137   K   --   4.1  4.1   CL   --   104  104   CO2   --   25  30   GLU   --   134*  140*   BUN   --   6  5*   CREA  0.82  0.79  0.85   CA   --   8.6  8.5   MG   --   1.9   --      No results found for: GLUCPOC  No results for input(s): PH, PCO2, PO2, HCO3, FIO2 in the last 72 hours. No results for input(s): INR in the last 72 hours.     No lab exists for component: INREXT  All Micro Results     Procedure Component Value Units Date/Time    CULTURE, BODY FLUID Hector Congo STAIN [[id number]] Collected:  08/19/17 1230    Order Status:  Completed Specimen:  Knee Fluid Updated:  08/20/17 1015     Special Requests: NO SPECIAL REQUESTS        GRAM STAIN         PLEASE REFER TO STAT GRAM STAIN     Culture result: NO GROWTH AFTER 16 HOURS       CULTURE, BODY FLUID Christie Harden [[id number]] Collected:  08/20/17 0915    Order Status:  Completed Updated:  08/20/17 0941    CULTURE, ANAEROBIC [[id number]] Collected:  08/20/17 0915    Order Status:  Completed Updated:  08/20/17 0941    CULTURE, URINE [[id number]] Collected:  08/19/17 1108    Order Status:  Completed Specimen:  Urine from Clean catch Updated:  08/19/17 [address] [[id number]] Collected:  08/19/17 1230    Order Status:  Completed Specimen:  Knee Fluid Updated:  08/19/17 8490     Special Requests: NO SPECIAL REQUESTS        GRAM STAIN 3+ WBCS SEEN         NO ORGANISMS SEEN       CULTURE, ANAEROBIC [[id number]] Collected:  08/19/17 1230    Order Status:  Completed Specimen:  Knee  Updated:  08/19/17 1807    CULTURE, URINE [[id number]]     Order Status:  Canceled Specimen:  Urine from Clean catch           I have reviewed notes of prior 24hr. Other pertinent lab:       Total time spent with patient: [address] discussed with: Patient, Nursing Staff and >50% of time spent in counseling and coordination of care    Discussed:  Care Plan    Prophylaxis:  Lovenox    Disposition:  Home w/Family           ___________________________________________________    Attending Physician: Deedee Flood MD

## 2017-08-20 NOTE — PROGRESS NOTES
Physical Therapy  Order received and acknowledged. Chart review noted patient to have I&D knee this am. We will defer today's evaluation and follow up Monday morning. Thank you.     Rachana Martinez, PT, DPT, CLT

## 2017-08-20 NOTE — H&P (VIEW-ONLY)
[address]  [phone]    Hospitalist Consult Note      NAME:  Venancio Ratliff   :   1965   MRN:  [id number]     Requesting Physician Marquis Lion MD   Reason for Consult:  Medical management. PCP:  Althea Pinto MD     Date/Time:  2017 7:37 PM       Assessment and Plan:      Knee pain, left / Debilitated patient - POA, they have a concern for infection, and may plan I&D in AM.  They started [address] is attending to this issue. Fall precautions, and PT/OT eval in AM    UTI (urinary tract infection) / Hx uterine fibroid / Leukocytosis  - Abnormal UA in ER. Cx sent. No symptoms, beyond leukocytosis. Continue empiric vanco and cipro, given for other reasons, awaiting cx result. Crohn's disease of both small and large intestine without complication - With recent diverticulitis. She should complete Cipro and Flagyl. She states she called her GI doctor, who told her to stop Flagyl, because is can cause B12 deficiency, which can cause neuropathic pain. I see no logic in that. Consult GI to comment. Essential hypertension - continue losartan. Anemia - Mild, stable, check B12    Obese - Advise weight loss        Subjective:     CHIEF COMPLAINT: L knee pain. HISTORY OF PRESENT ILLNESS:     Ms. Brenton Mckeon is a 50 y.o.  female who is admitted to the Ortho Service with left knee pain. We are asked to evaluate for medical management. She was discharged a few days ago after recovering from mild diverticulosis. She was doing well, but last night had sharp new L knee pain. Her ER workup was unremarkable, and ortho was called in to tap knee. Ortho admitted her for observation.       Past Medical History:   Diagnosis Date    Crohn's colitis ([facility] [address]     Hypertension     Ill-defined condition     history of  ulcerative colitis        Past Surgical History:   Procedure Laterality Date    COLONOSCOPY N/A 8/14/2017    COLONOSCOPY performed by Gagan Munoz MD at [address] [facility] [facility] [address] [facility] [address] [facility] [address] HX APPENDECTOMY      1993    HX CHOLECYSTECTOMY N/A     HX TONSIL AND ADENOIDECTOMY Bilateral     HX TONSIL AND ADENOIDECTOMY      HX WISDOM TEETH EXTRACTION         Social History   Substance Use Topics    Smoking status: Never Smoker    Smokeless tobacco: Never Used      Comment: some smoking in college    Alcohol use 1.8 oz/week     3 Glasses of wine per week        No family history on file. Allergies   Allergen Reactions    Codeine Nausea and Vomiting    Sulfa (Sulfonamide Antibiotics) Other (comments)     Makes face flush        Prior to Admission medications    Medication Sig Start Date End Date Taking? Authorizing Provider   HYDROcodone-acetaminophen (NORCO) 5-325 mg per tablet Take 1 Tab by mouth every six (6) hours as needed for Pain for up to 5 days. Max Daily Amount: 4 Tabs. 8/19/17 8/24/17 Yes Carlene Peralta NP   ciprofloxacin HCl (CIPRO) 500 mg tablet Take 1 Tab by mouth two (2) times a day for 6 days.  7 day course 8/11-8/17 8/17/17 8/23/17 Yes Philip Cordova MD   losartan (COZAAR) 25 mg tablet TAKE 1 TAB BY MOUTH DAILY FOR 90 DAYS. 7/23/17  Yes Historical Provider         Current Facility-Administered Medications:     ciprofloxacin HCl (CIPRO) tablet 500 mg, 500 mg, Oral, BID, DANAE Escamilla  [START ON 8/20/2017] losartan (COZAAR) tablet 25 mg, 25 mg, Oral, DAILY, Yelena Ennis NP    metroNIDAZOLE (FLAGYL) tablet 500 mg, 500 mg, Oral, TID, Yelena Ennis NP    acetaminophen (TYLENOL) tablet 650 mg, 650 mg, Oral, W7Q PRN, Yelena Ennis NP    diphenhydrAMINE (BENADRYL) injection 12.5 mg, 12.5 mg, IntraVENous, N0P PRN, Yelena Ennis NP    morphine injection 2 mg, 2 mg, IntraVENous, E6K PRN, Yelena Ennis NP    [facility]ansetron [facility]) injection 4 mg, 4 mg, IntraVENous, W7E PRN, Yelena Ennis NP    sodium chloride (NS) flush 5-10 mL, 5-10 mL, IntraVENous, M7F, DANAE Escamilla sodium chloride (NS) flush 5-10 mL, 5-10 mL, IntraVENous, I1H, Sisto [place], NP    sodium chloride (NS) flush 5-10 mL, 5-10 mL, IntraVENous, PRN, Sisto [place], NP    oxyCODONE-acetaminophen (PERCOCET) 5-325 mg per tablet 1 Tab, 1 Tab, Oral, L6H PRN, Sisto [place], NP, 1 Tab at 08/19/17 1933    naloxone [facility]) injection 0.4 mg, 0.4 mg, IntraVENous, PRN, Sisto [place], NP    diphenhydrAMINE (BENADRYL) capsule 25 mg, 25 mg, Oral, G3N PRN, Sisto [place], NP    ondansetron (ZOFRAN ODT) tablet 4 mg, 4 mg, Oral, N9B PRN, Sisto [place], NP    docusate sodium (COLACE) capsule 100 mg, 100 mg, Oral, BID, Sisto [place], NP    enoxaparin (LOVENOX) injection 40 mg, 40 mg, SubCUTAneous, J42U, Sisto [place], NP    naproxen (NAPROSYN) tablet 500 mg, 500 mg, Oral, BID WITH MEALS, S[place], NP, 847 mg at 08/19/17 1646    cefTRIAXone (ROCEPHIN) 1 g in 0.9% sodium chloride (MBP/ADV) 50 mL, 1 g, IntraVENous, Q24H, Luz Elena Holguin MD    Review of Systems:  (bold if positive, if negative)    Gen:  Eyes:  ENT:  CVS:  Pulm:  GI:    :    MS:  Pain, weakness, arthritisSkin:  Psych:  Endo:    Hem:  Renal:    Neuro:            Objective:      VITALS:    Vital signs reviewed; most recent are:    Visit Vitals    /84 (BP 1 Location: Left arm, BP Patient Position: At rest;Head of bed elevated (Comment degrees))    Pulse 98    Temp 99.5 °F (37.5 °C)    Resp 16    Ht 5' 6\" (1.676 m)    Wt 95.3 kg (210 lb)    SpO2 100%    BMI 33.89 kg/m2     SpO2 Readings from Last 6 Encounters:   08/19/17 100%   08/17/17 93%   08/11/17 98%   08/03/17 98%   01/04/17 96%          Intake/Output Summary (Last 24 hours) at 08/19/17 1937  Last data filed at 08/19/17 1916   Gross per 24 hour   Intake                0 ml   Output              300 ml   Net             -300 ml      All Micro Results     Procedure Component Value Units Date/Time    Merle Abbott [[id number]] Collected:  08/19/17 1230    Order Status:  Completed Specimen:  Knee Fluid Updated:  08/19/17 1820     Special Requests: NO SPECIAL REQUESTS        GRAM STAIN 3+ WBCS SEEN         NO ORGANISMS SEEN       CULTURE, BODY FLUID KENA Petersen [[id number]] Collected:  08/19/17 1230    Order Status:  Completed Specimen:  Knee Fluid Updated:  08/19/17 1807    CULTURE, ANAEROBIC [[id number]] Collected:  08/19/17 1230    Order Status:  Completed Specimen:  Knee  Updated:  08/19/17 1807    CULTURE, URINE [[id number]] Collected:  08/19/17 1108    Order Status:  Completed Specimen:  Urine from Clean catch Updated:  08/19/17 1436    CULTURE, URINE [[id number]]     Order Status:  Canceled Specimen:  Urine from Clean catch             Exam:     Physical Exam:    Gen:  Well-developed, well-nourished, in no acute distress  HEENT:  Pink conjunctivae, PERRL, hearing intact to voice, moist mucous membranes  Neck:  Supple, without masses, thyroid non-tender  Resp:  No accessory muscle use, clear breath sounds without wheezes rales or rhonchi  Card:  No murmurs, normal S1, S2 without thrills, bruits or peripheral edema  Abd:  Soft, non-tender, non-distended, normoactive bowel sounds are present, no mass  Lymph:  No cervical or inguinal adenopathy  Musc:  No cyanosis or clubbing  Skin:  No rashes or ulcers, skin turgor is good  Neuro:  Cranial nerves are grossly intact, no focal motor weakness, follows commands appropriately  Psych:  Good insight, oriented to person, place and time, alert       Labs:    Recent Labs      08/19/17   1629   WBC  12.9*   HGB  8.9*   HCT  27.2*   PLT  328     Recent Labs      08/19/17   1629   NA  137   K  4.1   CL  104   CO2  30   GLU  140*   BUN  5*   CREA  0.85   CA  8.5     No results found for: GLUCPOC  No results for input(s): PH, PCO2, PO2, HCO3, FIO2 in the last 72 hours. No results for input(s): INR in the last 72 hours.     No lab exists for component: INREXT    I have reviewed previous records,    Telemetry reviewed:   normal sinus rhythm    Risk of deterioration: medium      Total time spent with patient: 45 minutes                  Care Plan discussed with: Patient, Nursing Staff and >50% of time spent in counseling and coordination of care    Discussed:  Care Plan       ___________________________________________________    Attending Physician: Lupis Gleason MD

## 2017-08-20 NOTE — CONSULTS
[address]  [address] [facility], [address] [facility]  [phone]    [place]. Briseyda Solis MD      GI CONSULTATION NOTE      NAME:  Kylee Salazar   :   1965   MRN:   [id number]       Referring Physician: Dr. Cordero End Date: 2017     Chief Complaint:  Crohn's disease    History of Present Illness:  Patient is a 50 y.o. woman who is seen in consultation at the request of Dr. Leisa Le for management of Crohn's disease. She is currently admitted with a septic knee and UTI for which she is receiving cipro and vancomycin. Ms. Nicolás Cline had some concerns about the metronidazole and thought that it may have caused or been related to her knee issues/septic joint. She states she was told to stop it by Dr. Marcela Gutierrez this morning but she is not sure of the reason. In regards to her Crohn's history, she reports being diagnosed originally with UC in  and was treated with 6-MP and mesalamine. She did well until  and then she was started on IFX which she responded well to and was followed by Dr. Cj Banerjee. She the stopped the IFX in 2016 because she did not like the side effects. She went without therapy and then in 2017 reports developing mucus in her stools and progressively worse diarrhea and bloody stools. She was started on prednisone 30 mg and tapered to 25 mg before it being stopped when she was admitted on  after a syncopal episode at home following a bloody stool. Dr. Sarthak Oreilly performed a colonoscopy on 17 and per his report \"active colonic crohn's disease with colonic diverticulitis superimposed. Has anal crohn's disease; has anorectal polyp. \" Dr. Zabrina Merchant suggested that she complete a course of antibiotics to ensure resolution of diverticulitis prior to starting biologic therapy. She was started on metronidazole and cipro and then states that she developed knee swelling in her right knee which was tapped and aseptic.  She then developed a septic knee on her right side. She is currently having 3-4 watery stools daily. Haven't been bloody today. PMH:  Past Medical History:   Diagnosis Date    Crohn's colitis ([address]     Hypertension     Ill-defined condition     history of  ulcerative colitis       PSH:  Past Surgical History:   Procedure Laterality Date    COLONOSCOPY N/A 8/14/2017    COLONOSCOPY performed by Ricardo Guevara MD at [address] [facility] HX CHOLECYSTECTOMY N/A     HX TONSIL AND ADENOIDECTOMY Bilateral     HX TONSIL AND ADENOIDECTOMY      HX WISDOM TEETH EXTRACTION         Allergies: Allergies   Allergen Reactions    Codeine Nausea and Vomiting    Sulfa (Sulfonamide Antibiotics) Other (comments)     Makes face flush       Home Medications:  Prior to Admission Medications   Prescriptions Last Dose Informant Patient Reported? Taking?   ciprofloxacin HCl (CIPRO) 500 mg tablet 8/18/2017 at Unknown time Self No Yes   Sig: Take 1 Tab by mouth two (2) times a day for 6 days. 7 day course 8/11-8/17   losartan (COZAAR) 25 mg tablet 8/18/2017 at AM Self Yes Yes   Sig: TAKE 1 TAB BY MOUTH DAILY FOR 90 DAYS.       Facility-Administered Medications: None       Hospital Medications:  Current Facility-Administered Medications   Medication Dose Route Frequency    [START ON 8/21/2017] Vancomycin trough at 09:30 on 8/21/17   Other Rx Dosing/Monitoring    ciprofloxacin HCl (CIPRO) tablet 500 mg  500 mg Oral BID    losartan (COZAAR) tablet 25 mg  25 mg Oral DAILY    metroNIDAZOLE (FLAGYL) tablet 500 mg  500 mg Oral TID    acetaminophen (TYLENOL) tablet 650 mg  650 mg Oral Q4H PRN    diphenhydrAMINE (BENADRYL) injection 12.5 mg  12.5 mg IntraVENous Q4H PRN    morphine injection 2 mg  2 mg IntraVENous Q4H PRN    ondansetron (ZOFRAN) injection 4 mg  4 mg IntraVENous Q4H PRN    sodium chloride (NS) flush 5-10 mL  5-10 mL IntraVENous Q8H    sodium chloride (NS) flush 5-10 mL  5-10 mL IntraVENous Q8H    sodium chloride (NS) flush 5-10 mL  5-10 mL IntraVENous PRN    oxyCODONE-acetaminophen (PERCOCET) 5-325 mg per tablet 1 Tab  1 Tab Oral Q4H PRN    naloxone (NARCAN) injection 0.4 mg  0.4 mg IntraVENous PRN    diphenhydrAMINE (BENADRYL) capsule 25 mg  25 mg Oral Q4H PRN    ondansetron (ZOFRAN ODT) tablet 4 mg  4 mg Oral Q4H PRN    docusate sodium (COLACE) capsule 100 mg  100 mg Oral BID    enoxaparin (LOVENOX) injection 40 mg  40 mg SubCUTAneous Q24H    naproxen (NAPROSYN) tablet 500 mg  500 mg Oral BID WITH MEALS    vancomycin (VANCOCIN) 1,500 mg in 0.9% sodium chloride 500 mL IVPB  1,500 mg IntraVENous Q12H       Social History:  Social History   Substance Use Topics    Smoking status: Never Smoker    Smokeless tobacco: Never Used      Comment: some smoking in college    Alcohol use 1.8 oz/week     3 Glasses of wine per week       Family History:  History reviewed. No pertinent family history. Review of Systems:  A detailed 10 system ROS is obtained, with pertinent positives as listed in the HPI and PMH. All others are negative. Objective:   Patient Vitals for the past 8 hrs:   BP Temp Pulse Resp SpO2   08/20/17 1548 127/78 98.9 °F (37.2 °C) (!) 118 16 96 %   08/20/17 1442 131/78 97.9 °F (36.6 °C) (!) 110 16 98 %   08/20/17 1302 123/75 98.6 °F (37 °C) 99 16 95 %   08/20/17 1203 133/73 97.9 °F (36.6 °C) 94 14 97 %   08/20/17 1057 133/79 99 °F (37.2 °C) 94 12 98 %   08/20/17 1040 139/83 99 °F (37.2 °C) 87 11 97 %   08/20/17 1035 138/71 - 91 11 97 %   08/20/17 1030 136/87 - 90 12 98 %   08/20/17 1025 138/87 - 98 11 99 %   08/20/17 1020 143/88 - 100 16 99 %   08/20/17 1018 142/90 98.9 °F (37.2 °C) 98 14 99 %   08/20/17 1015 143/78 - 95 - 100 %   08/20/17 1014 142/90 98.9 °F (37.2 °C) 95 13 99 %     08/20 0701 - 08/20 1900  In: 1400 [I.V.:1400]  Out: 325 [Urine:300]  08/18 1901 - 08/20 0700  In: -   Out: 1100 [Urine:1100]        PHYSICAL EXAM:  General: Pleasant. WD, WN.  Alert, cooperative, no acute distress    HEENT: NC, Atraumatic. Anicteric sclerae. Lungs:  CTA Bilaterally. No Wheezing/Rhonchi/Rales. Heart:  Regular  rhythm,  No murmur, No Rubs, No Gallops  Abdomen: Soft, Non distended, Non tender.  +Bowel sounds, no HSM  Extremities: Right knee wrapped with ACE  Neurologic:  CNs grossly intact, Alert and oriented X 3. No acute neurological distress   Psych:   Good insight.     Data Review     Recent Labs      08/19/17 2004 08/19/17   1629   WBC  14.1*  12.9*   HGB  8.4*  8.9*   HCT  26.6*  27.2*   PLT  332  328     Recent Labs      08/20/17   0518  08/19/17 2004 08/19/17   1629   NA   --   140  137   K   --   4.1  4.1   CL   --   104  104   CO2   --   25  30   BUN   --   6  5*   CREA  0.82  0.79  0.85   GLU   --   134*  140*   CA   --   8.6  8.5     Assessment:   51 yo woman originally diagnosed with UC in 2000 that was in remission on IFX until she stopped it in June 2016 and ultimately developed significant symptoms this July. She had a colonoscopy last week showing diverticulitis and findings consistent with Crohn's colitis. She was started on metronidazole and cipro to clear up the diverticulitis prior to restarting biologic therapy but then developed a septic joint. I am unaware of a link between metronidazole and her septic joint. I spoke with Dr. Claudean Brookes and he had her stop her metronidazole this morning when she called because he understood that she was having paresthesias/neuropathic symptom. She denies these symptoms and I think it was a matter of miscommunication. It is okay to resume the metronidazole as it will help her IBD. Plan:   -Okay to resume metronidazole as it will help diverticulitis and her IBD. -Consider starting Uceris if no improvement in diarrhea and as a bridge to biologic therapy while she is waiting to clear up her septic knee and diverticulitis.   -Dr. Tiffanie Avina and his team will return tomorrow to continue care.         Camille Berry MD

## 2017-08-20 NOTE — OP NOTES
OPERATIVE REPORT     Admit Date: 8/19/2017  Admit Diagnosis: Knee pain, left  LEFT KNEE INFECTION    Date of Procedure: 8/20/2017   Preoperative Diagnosis: LEFT KNEE INFECTION  Postoperative Diagnosis: * No post-op diagnosis entered *    Procedure: Procedure(s):  KNEE ARTHROSCOPY INCISION AND DRAINAGE OF LEFT KNEE  Surgeon: Hoa Wu MD  Assistant(s): None  Anesthesia: General   Estimated Blood Loss: 20cc  Specimens: * No specimens in log *   Complications: None      INDICATIONS:   The patient has complained of left knee pain. The arthrocentesis demonstrated 50K WBC. Informed consent obtained including a discussion of the risks and benefits, which include, but are not limited to, bleeding, infection, neurovascular damage, wound complications, pain and stiffness in the knee, periprosthetic loosening, fracture dislocation and DVT, the patient consented for the procedure. In addition to the above I aspirated the right knee in the OR and found purulent material.    PROCEDURE :     The patient was seen in the preoperative holding area, questions were answered and the appropriate limb was identified and initialized. Verbal and written informed consent were obtained. The patient was then taken to the operating room and underwent anesthesia. The patient was positioned on the operating room table and the limb was prepped and draped in a sterile fashion. Prior to the incision the appropriate time-out was performed. Using an anterolateral portal and incision was created and the arthroscope was introduced into the knee. An anteromedial portal was established under direct visualization utilizing a spinal needle for localization. Systematic inspection of the knee was undertaken to include visualization of the patellofemoral joint, the medial gutter, medial compartment, the notch, lateral compartment and lateral gutter. Debridement and synovectomy were performed to aid with visualization.      The exact incisions and procedure was done on the right knee. Findings : med/lateral/pfj were without defects        At the conclusion of the procedure all arthroscopic instruments were withdrawn from the knee. Residual fluid was exsanguinated and the portal sites were closed with 4-0 Monocryl suture. Steri-strips and an absorbable dressing were applied with a loosely fitting ace-wrap. The patient was awoken from general anesthesia and taken to the recovery room in a stable condition.      IMPLANTS :  * No implants in log *

## 2017-08-20 NOTE — INTERVAL H&P NOTE
H&P Update:  Zoya Ayala was seen and examined. History and physical has been reviewed. The patient has been examined.  There have been no significant clinical changes since the completion of the originally dated History and Physical.    Signed By: Prasad Cintron MD     August 20, 2017 8:30 AM

## 2017-08-21 PROBLEM — D72.829 LEUKOCYTOSIS: Status: RESOLVED | Noted: 2017-08-19 | Resolved: 2017-08-21

## 2017-08-21 PROBLEM — D64.9 ANEMIA: Chronic | Status: ACTIVE | Noted: 2017-08-19

## 2017-08-21 PROBLEM — M00.9 SEPTIC ARTHRITIS OF KNEE, LEFT (HCC): Status: ACTIVE | Noted: 2017-08-21

## 2017-08-21 PROBLEM — R53.81 DEBILITATED PATIENT: Chronic | Status: ACTIVE | Noted: 2017-08-19

## 2017-08-21 PROBLEM — I10 ESSENTIAL HYPERTENSION: Chronic | Status: ACTIVE | Noted: 2017-01-04

## 2017-08-21 PROBLEM — K50.80 CROHN'S DISEASE OF BOTH SMALL AND LARGE INTESTINE WITHOUT COMPLICATION (HCC): Chronic | Status: ACTIVE | Noted: 2017-01-04

## 2017-08-21 PROBLEM — E66.9 OBESE: Chronic | Status: ACTIVE | Noted: 2017-08-19

## 2017-08-21 LAB
ANION GAP SERPL CALC-SCNC: 4 MMOL/L (ref 5–15)
BASOPHILS # BLD: 0 K/UL (ref 0–0.1)
BASOPHILS NFR BLD: 0 % (ref 0–1)
BUN SERPL-MCNC: 7 MG/DL (ref 6–20)
BUN/CREAT SERPL: 9 (ref 12–20)
C TRACH DNA SPEC QL NAA+PROBE: NEGATIVE
CALCIUM SERPL-MCNC: 8.1 MG/DL (ref 8.5–10.1)
CHLORIDE SERPL-SCNC: 109 MMOL/L (ref 97–108)
CO2 SERPL-SCNC: 28 MMOL/L (ref 21–32)
CREAT SERPL-MCNC: 0.79 MG/DL (ref 0.55–1.02)
DATE LAST DOSE: NORMAL
EOSINOPHIL # BLD: 0.1 K/UL (ref 0–0.4)
EOSINOPHIL NFR BLD: 2 % (ref 0–7)
ERYTHROCYTE [DISTWIDTH] IN BLOOD BY AUTOMATED COUNT: 14.4 % (ref 11.5–14.5)
EST. AVERAGE GLUCOSE BLD GHB EST-MCNC: ABNORMAL MG/DL
FERRITIN SERPL-MCNC: 575 NG/ML (ref 8–252)
GLUCOSE SERPL-MCNC: 109 MG/DL (ref 65–100)
HAPTOGLOB SERPL-MCNC: 284 MG/DL (ref 30–200)
HBA1C MFR BLD: <3.5 % (ref 4.2–6.3)
HCT VFR BLD AUTO: 22 % (ref 35–47)
HEMOCCULT STL QL: POSITIVE
HGB BLD-MCNC: 6.8 G/DL (ref 11.5–16)
HGB BLD-MCNC: 7 G/DL (ref 11.5–16)
IRON SATN MFR SERPL: 11 % (ref 20–50)
IRON SERPL-MCNC: 12 UG/DL (ref 35–150)
LDH SERPL L TO P-CCNC: 108 U/L (ref 81–246)
LYMPHOCYTES # BLD: 0.9 K/UL (ref 0.8–3.5)
LYMPHOCYTES NFR BLD: 12 % (ref 12–49)
MCH RBC QN AUTO: 30.1 PG (ref 26–34)
MCHC RBC AUTO-ENTMCNC: 30.9 G/DL (ref 30–36.5)
MCV RBC AUTO: 97.3 FL (ref 80–99)
MONOCYTES # BLD: 0.8 K/UL (ref 0–1)
MONOCYTES NFR BLD: 10 % (ref 5–13)
N GONORRHOEA DNA SPEC QL NAA+PROBE: NEGATIVE
NEUTS SEG # BLD: 5.9 K/UL (ref 1.8–8)
NEUTS SEG NFR BLD: 76 % (ref 32–75)
PLATELET # BLD AUTO: 320 K/UL (ref 150–400)
POTASSIUM SERPL-SCNC: 3.9 MMOL/L (ref 3.5–5.1)
RBC # BLD AUTO: 2.26 M/UL (ref 3.8–5.2)
REPORTED DOSE,DOSE: NORMAL UNITS
REPORTED DOSE/TIME,TMG: 1022
RETICS/RBC NFR AUTO: 2.2 % (ref 0.7–2.1)
SAMPLE TYPE: NORMAL
SERVICE CMNT-IMP: NORMAL
SODIUM SERPL-SCNC: 141 MMOL/L (ref 136–145)
SPECIMEN SOURCE: NORMAL
TIBC SERPL-MCNC: 109 UG/DL (ref 250–450)
VANCOMYCIN TROUGH SERPL-MCNC: 7.9 UG/ML (ref 5–10)
WBC # BLD AUTO: 7.7 K/UL (ref 3.6–11)

## 2017-08-21 PROCEDURE — 97162 PT EVAL MOD COMPLEX 30 MIN: CPT

## 2017-08-21 PROCEDURE — 65270000029 HC RM PRIVATE

## 2017-08-21 PROCEDURE — 85018 HEMOGLOBIN: CPT | Performed by: SPECIALIST

## 2017-08-21 PROCEDURE — 82272 OCCULT BLD FECES 1-3 TESTS: CPT | Performed by: INTERNAL MEDICINE

## 2017-08-21 PROCEDURE — 99218 HC RM OBSERVATION: CPT

## 2017-08-21 PROCEDURE — 74011250636 HC RX REV CODE- 250/636: Performed by: NURSE PRACTITIONER

## 2017-08-21 PROCEDURE — 80202 ASSAY OF VANCOMYCIN: CPT | Performed by: NURSE PRACTITIONER

## 2017-08-21 PROCEDURE — 97110 THERAPEUTIC EXERCISES: CPT

## 2017-08-21 PROCEDURE — 82272 OCCULT BLD FECES 1-3 TESTS: CPT | Performed by: FAMILY MEDICINE

## 2017-08-21 PROCEDURE — 83010 ASSAY OF HAPTOGLOBIN QUANT: CPT | Performed by: INTERNAL MEDICINE

## 2017-08-21 PROCEDURE — 82728 ASSAY OF FERRITIN: CPT | Performed by: INTERNAL MEDICINE

## 2017-08-21 PROCEDURE — 80048 BASIC METABOLIC PNL TOTAL CA: CPT | Performed by: INTERNAL MEDICINE

## 2017-08-21 PROCEDURE — 83615 LACTATE (LD) (LDH) ENZYME: CPT | Performed by: INTERNAL MEDICINE

## 2017-08-21 PROCEDURE — 36415 COLL VENOUS BLD VENIPUNCTURE: CPT | Performed by: INTERNAL MEDICINE

## 2017-08-21 PROCEDURE — 97165 OT EVAL LOW COMPLEX 30 MIN: CPT | Performed by: OCCUPATIONAL THERAPIST

## 2017-08-21 PROCEDURE — 74011250637 HC RX REV CODE- 250/637: Performed by: NURSE PRACTITIONER

## 2017-08-21 PROCEDURE — 83540 ASSAY OF IRON: CPT | Performed by: INTERNAL MEDICINE

## 2017-08-21 PROCEDURE — 97116 GAIT TRAINING THERAPY: CPT

## 2017-08-21 PROCEDURE — 85025 COMPLETE CBC W/AUTO DIFF WBC: CPT | Performed by: ORTHOPAEDIC SURGERY

## 2017-08-21 PROCEDURE — 83036 HEMOGLOBIN GLYCOSYLATED A1C: CPT | Performed by: ORTHOPAEDIC SURGERY

## 2017-08-21 PROCEDURE — 85045 AUTOMATED RETICULOCYTE COUNT: CPT | Performed by: INTERNAL MEDICINE

## 2017-08-21 RX ADMIN — Medication 10 ML: at 06:46

## 2017-08-21 RX ADMIN — Medication 10 ML: at 22:00

## 2017-08-21 RX ADMIN — METRONIDAZOLE 500 MG: 250 TABLET, FILM COATED ORAL at 22:30

## 2017-08-21 RX ADMIN — OXYCODONE HYDROCHLORIDE AND ACETAMINOPHEN 1 TABLET: 5; 325 TABLET ORAL at 13:07

## 2017-08-21 RX ADMIN — OXYCODONE HYDROCHLORIDE AND ACETAMINOPHEN 1 TABLET: 5; 325 TABLET ORAL at 22:48

## 2017-08-21 RX ADMIN — NAPROXEN 500 MG: 250 TABLET ORAL at 08:41

## 2017-08-21 RX ADMIN — CIPROFLOXACIN HYDROCHLORIDE 500 MG: 500 TABLET, FILM COATED ORAL at 08:41

## 2017-08-21 RX ADMIN — VANCOMYCIN HYDROCHLORIDE 1500 MG: 10 INJECTION, POWDER, LYOPHILIZED, FOR SOLUTION INTRAVENOUS at 20:12

## 2017-08-21 RX ADMIN — OXYCODONE HYDROCHLORIDE AND ACETAMINOPHEN 1 TABLET: 5; 325 TABLET ORAL at 06:46

## 2017-08-21 RX ADMIN — VANCOMYCIN HYDROCHLORIDE 1500 MG: 10 INJECTION, POWDER, LYOPHILIZED, FOR SOLUTION INTRAVENOUS at 11:46

## 2017-08-21 RX ADMIN — NAPROXEN 500 MG: 250 TABLET ORAL at 18:03

## 2017-08-21 RX ADMIN — METRONIDAZOLE 500 MG: 250 TABLET, FILM COATED ORAL at 18:02

## 2017-08-21 RX ADMIN — Medication 10 ML: at 13:08

## 2017-08-21 RX ADMIN — ENOXAPARIN SODIUM 40 MG: 40 INJECTION SUBCUTANEOUS at 18:05

## 2017-08-21 RX ADMIN — OXYCODONE HYDROCHLORIDE AND ACETAMINOPHEN 1 TABLET: 5; 325 TABLET ORAL at 18:08

## 2017-08-21 RX ADMIN — CIPROFLOXACIN HYDROCHLORIDE 500 MG: 500 TABLET, FILM COATED ORAL at 18:04

## 2017-08-21 NOTE — PROGRESS NOTES
Primary Nurse Vineet Lutz RN and Pita Carcamo RN performed a dual skin assessment on this patient Impairment noted- see wound doc flow sheet  Ilan score is 21

## 2017-08-21 NOTE — PROGRESS NOTES
8/21/2017 3:58 PM At [facility] has accepted pt. Nurse navigator for pt's pcp was notified of pt's admission. 8/21/2017 12:35 PM Discussed discharge with Ortho PA, pt will need iv abx at discharge. Home health recommendations from PT noted. Met with pt to discuss. Choices for iv infusion company and home health given. At [facility] was selected as preferred home health agency and [address] for infusion. Referrals sent to both agencies via [employer]. Will need home health and final iv abx orders prior to discharge. 8/21/2017 8:38 AM EMR reviewed, pt was recently at [facility] from 8/12-8/17 with an admitting diagnosis of diverticulitis. No home health services were indicated at discharge, pt has a rrat score of 7. Pt was brought in under Observation status for a left knee infection. Obs letter was provided and reviewed with pt, pt refused to sign letter and requested her status be reevaluated for possible upgrade to inpatient. Message sent to [facility] to have pt's admission status reviewed. Pt lives with 2 roommates in [address] [place] [address]. Pt has no history of home health and does not own any dme. Pt was independent with adls and driving prior to admission. 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 Pt has rx coverage and fills her scripts at the [facility] on [address] CM will follow for final discharge recomendations, possible home health and abx at discharge. JEOVANY Cooley   Care Management Interventions  PCP Verified by CM:  Yes Howard Alvarez Signup: No  Physical Therapy Consult: Yes  Occupational Therapy Consult: Yes  Speech Therapy Consult: No  Current Support Network: Own Home

## 2017-08-21 NOTE — PROGRESS NOTES
Bedside and Verbal shift change report given to Lord Yesenia RN (oncoming nurse) by Lon León RN (offgoing nurse). Report included the following information SBAR, Kardex, Procedure Summary, Intake/Output, MAR and Recent Results.

## 2017-08-21 NOTE — PROGRESS NOTES
[facility] [address]  [address] [facility], [address] [place] [address]  [phone]      Medical Progress Note      NAME: Ellen Morocho   :  1965  MRM:  [id number]    Date/Time: 2017  9:28 AM         Subjective:     Chief Complaint:  Pain: bilateral knees, moderate, constant, better with pain meds    ROS:  (bold if positive, if negative)                        Tolerating PT  Tolerating Diet          Objective:       Vitals:          Last 24hrs VS reviewed since prior progress note.  Most recent are:    Visit Vitals    /66 (BP 1 Location: Left arm, BP Patient Position: Head of bed elevated (Comment degrees))    Pulse 92    Temp 98.7 °F (37.1 °C)    Resp 16    Ht 5' 6\" (1.676 m)    Wt 95.3 kg (210 lb)    SpO2 97%    BMI 33.89 kg/m2     SpO2 Readings from Last 6 Encounters:   17 97%   17 93%   17 98%   17 98%   17 96%    O2 Flow Rate (L/min): 2 l/min     Intake/Output Summary (Last 24 hours) at 17 7210  Last data filed at 17 0358   Gross per 24 hour   Intake             1400 ml   Output             2125 ml   Net             -725 ml          Exam:     Physical Exam:    Gen:  Well-developed, obese, in no acute distress  HEENT:  Pink conjunctivae, PERRL, hearing intact to voice, moist mucous membranes  Neck:  Supple, without masses, thyroid non-tender  Resp:  No accessory muscle use, clear breath sounds without wheezes rales or rhonchi  Card:  No murmurs, normal S1, S2 without thrills, bruits or peripheral edema  Abd:  Soft, non-tender, non-distended, normoactive bowel sounds are present, no palpable organomegaly and no detectable hernias  Lymph:  No cervical or inguinal adenopathy  Musc:  No cyanosis or clubbing  Skin:  No rashes or ulcers, skin turgor is good, bilateral legs wrapped with ACE  Neuro:  Cranial nerves are grossly intact, no focal motor weakness, follows commands appropriately  Psych:  Good insight, oriented to person, place and time, alert       Medications Reviewed: (see below)    Lab Data Reviewed: (see below)    ______________________________________________________________________    Medications:     Current Facility-Administered Medications   Medication Dose Route Frequency    Vancomycin trough at 09:30 on 8/21/17   Other Rx Dosing/Monitoring    ciprofloxacin HCl (CIPRO) tablet 500 mg  500 mg Oral BID    losartan (COZAAR) tablet 25 mg  25 mg Oral DAILY    metroNIDAZOLE (FLAGYL) tablet 500 mg  500 mg Oral TID    acetaminophen (TYLENOL) tablet 650 mg  650 mg Oral Q4H PRN    diphenhydrAMINE (BENADRYL) injection 12.5 mg  12.5 mg IntraVENous Q4H PRN    morphine injection 2 mg  2 mg IntraVENous Q4H PRN    ondansetron (ZOFRAN) injection 4 mg  4 mg IntraVENous Q4H PRN    sodium chloride (NS) flush 5-10 mL  5-10 mL IntraVENous Q8H    sodium chloride (NS) flush 5-10 mL  5-10 mL IntraVENous Q8H    sodium chloride (NS) flush 5-10 mL  5-10 mL IntraVENous PRN    oxyCODONE-acetaminophen (PERCOCET) 5-325 mg per tablet 1 Tab  1 Tab Oral Q4H PRN    naloxone (NARCAN) injection 0.4 mg  0.4 mg IntraVENous PRN    diphenhydrAMINE (BENADRYL) capsule 25 mg  25 mg Oral Q4H PRN    ondansetron (ZOFRAN ODT) tablet 4 mg  4 mg Oral Q4H PRN    docusate sodium (COLACE) capsule 100 mg  100 mg Oral BID    enoxaparin (LOVENOX) injection 40 mg  40 mg SubCUTAneous Q24H    naproxen (NAPROSYN) tablet 500 mg  500 mg Oral BID WITH MEALS    vancomycin (VANCOCIN) 1,500 mg in 0.9% sodium chloride 500 mL IVPB  1,500 mg IntraVENous Q12H            Lab Review:     Recent Labs      08/21/17   0406  08/19/17   2004  08/19/17   1629   WBC  7.7  14.1*  12.9*   HGB  6.8*  8.4*  8.9*   HCT  22.0*  26.6*  27.2*   PLT  320  332  328     Recent Labs      08/21/17   0209  08/20/17   0518  08/19/17 2004 08/19/17   1629   NA  141   --   140  137   K  3.9   --   4.1  4.1   CL  109*   --   104  104   CO2  28   --   25  30   GLU  109*   --   134*  140*   BUN  7 --   6  5*   CREA  0.79  0.82  0.79  0.85   CA  8.1*   --   8.6  8.5   MG   --    --   1.9   --      No results found for: GLUCPOC  No results for input(s): PH, PCO2, PO2, HCO3, FIO2 in the last 72 hours. No results for input(s): INR in the last 72 hours. No lab exists for component: INREXT  No results found for: SDES  Lab Results   Component Value Date/Time    Culture result: PENDING 08/20/2017 09:15 AM    Culture result: PENDING 08/20/2017 09:15 AM    Culture result: No growth thus far, holding 14 days. 08/19/2017 12:30 PM    Culture result: Culture performed on Unspun Fluid 08/19/2017 12:30 PM    Culture result: No growth thus far, holding 14 days. 08/19/2017 12:30 PM            Assessment:     Principal Problem:    Knee pain, left (8/19/2017)    Active Problems:    Essential hypertension (1/4/2017)      Crohn's disease of both small and large intestine without complication ([address] (4/7/6207)      Anemia (8/19/2017)      Debilitated patient (8/19/2017)      Obese (8/19/2017)      UTI (urinary tract infection) (8/19/2017)      Leukocytosis (8/19/2017)           Plan:      Active Problems:    Essential hypertension (1/4/2017)   - BP okay, follow on meds as above      Crohn's disease of both small and large intestine without complication ([address] (9/9/1036)   - GI following      Anemia (8/19/2017)   - Hgb down without overt evidence of blood loss   - may have chronic anemia due to Crohns   - check anemia labs      Debilitated patient (8/19/2017)   - mobilize with PT/OT      Obese (8/19/2017)   - counseled on weight loss       UTI (urinary tract infection) (8/19/2017)   - Enterococcus on urine culture   - covered for now with vancomycin, await final culture      Leukocytosis (8/19/2017)   - resolved      Knee pain, left (8/19/2017)   - management per Orthopedics      Total time spent with patient: 25 minutes                  Care Plan discussed with: Patient, Care Manager and Nursing Staff    Discussed:  Code Status, Care Plan and D/C Planning    Prophylaxis:  Lovenox    Disposition:  HH PT, OT, RN           ___________________________________________________    Attending Physician: Rohan Lagunas MD

## 2017-08-21 NOTE — PROGRESS NOTES
Orthopaedic Progress Note  Post Op day: 1 Day Post-Op    2017 12:04 PM     Patient: Debra Sutton MRN: [id number]  SSN: [id number]    YOB: 1965  Age: 50 y.o. Sex: female      Admit date:  2017  Date of Surgery:  2017   Procedures:  Procedure(s):  KNEE ARTHROSCOPY INCISION AND DRAINAGE OF LEFT AND RIGHT KNEE  Admitting Physician:  Shahab Avina MD   Surgeon:  Jodine Gowers) and Role:     * Shahab Avina MD - Primary    Consulting Physician(s): Treatment Team: Attending Provider: Shahab Avina MD; Nurse Practitioner: Imelda Upton NP; Consulting Provider: Mary Ann Husain NP; Consulting Provider: Shahab Avina MD; Utilization Review: Rebeca Joseph RN; Consulting Provider: Lana Faust MD; Utilization Review: Walter Chambers RN; Care Manager: Wm Boyce    SUBJECTIVE:     Debra Sutton is a 50 y.o. female is 1 Day Post-Op s/p Procedure(s):  KNEE ARTHROSCOPY INCISION AND DRAINAGE OF LEFT AND RIGHT KNEE with an appropriate level of post-operative pain. No complaints of nausea, vomiting, dizziness, lightheadedness, chest pain, or shortness of breath. OBJECTIVE:       Physical Exam:  General: Alert, cooperative, no distress. Respiratory: Respirations unlabored  Neurological:  Neurovascular exam within normal limits. Motor: + DF/PF. Musculoskeletal: Calves soft, supple, non-tender upon palpation. Dressing/Wound:  Clean, dry and intact. No significant erythema or swelling.       Vital Signs:      Patient Vitals for the past 8 hrs:   BP Temp Pulse Resp SpO2   17 1017 132/60 - (!) 105 - 99 %   17 1007 128/79 - (!) 130 - 99 %   17 1005 137/79 - (!) 116 - 99 %   17 0955 117/68 - (!) 103 - 98 %   17 0811 118/66 98.7 °F (37.1 °C) 92 16 97 %                                          Temp (24hrs), Av.5 °F (36.9 °C), Min:97.7 °F (36.5 °C), Max:99.6 °F (37.6 °C)      Labs:        Recent Labs      17   0406   HCT 22.0*   HGB  6.8*     Lab Results   Component Value Date/Time    Sodium 141 08/21/2017 02:09 AM    Potassium 3.9 08/21/2017 02:09 AM    Chloride 109 08/21/2017 02:09 AM    CO2 28 08/21/2017 02:09 AM    Glucose 109 08/21/2017 02:09 AM    BUN 7 08/21/2017 02:09 AM    Creatinine 0.79 08/21/2017 02:09 AM    Calcium 8.1 08/21/2017 02:09 AM       PT/OT:        Gait  Base of Support: Widened  Speed/Astrid: Slow  Step Length: Left shortened, Right shortened  Gait Abnormalities: Antalgic  Ambulation - Level of Assistance: Minimal assistance  Distance (ft): 20 Feet (ft)  Assistive Device: Gait belt, Walker, rolling       Patient mobility  Bed Mobility Training  Supine to Sit: Modified independent  Sit to Supine: Modified independent  Transfer Training  Sit to Stand: Moderate assistance, Additional time, Assist x2  Stand to Sit: Minimum assistance, Additional time      Gait Training  Assistive Device: Gait belt, Walker, rolling  Ambulation - Level of Assistance: Minimal assistance  Distance (ft): 20 Feet (ft)   Weight Bearing Status  Right Side Weight Bearing: As tolerated  Left Side Weight Bearing: As tolerated        ASSESSMENT / PLAN:   Principal Problem:    Knee pain, left (8/19/2017)    Active Problems:    Essential hypertension (1/4/2017)      Crohn's disease of both small and large intestine without complication ([facility] (5/9/0693)      Anemia (8/19/2017)      Debilitated patient (8/19/2017)      Obese (8/19/2017)      UTI (urinary tract infection) (8/19/2017)       A: 1. Septic arthritis, left and right knee      2. S/P bilateral knee arthroscopic I and D    P: 1. Waiting for final cultures on left and right knee. 2. May WBAT on BL LE.  ROM as tolerated. 3. Continue IV abx.      4. ID to see- consult in. Spoke with Dr. Sohail David; she will see once final cultures are back to make recommendations. 5. Keep ACE on today. May remove tomorrow and apply banaids to portal incisions.             Signed By:  Lino Feliciano, [address] [place] [address] 114  Pgr.  [phone]

## 2017-08-21 NOTE — PROGRESS NOTES
Gastroenterology Consult Note   Follow-up Consult Note    Date: 8/21/2017    Assessment:  · Chrohn's disease/Diverticulitis: Appears to be improving. Mild persistent tenderness on exam, but otherwise improved from previous admission. Continuing antibiotics for Diverticulitis, will not start Biologics until after completion of abx/resolution of infection. Septic knee management per Ortho/Primary. · Acute on Chronic Anemia: Unclear if related to chronic Anemia with new infection present, particularly following recent GI bleed related to Chrohn's/Diverticulitis as well. No rafiq bleeding at this time, ruling out acute GI bleed component. Plan:  · Continue Metronidazole, finish out original course, consider repeat if diverticulitis not fully resolved. · Plan to for starting Biologics after stopping Abx and possible repeat Colonoscopy  · Follow up FOBT and anemia labs  · Recommend restart supplement, Iron 325 mg daily, H77 7887 mg, and Folic acid 543 mg. Thank you very much for allowing us to participate in the care of this pleasant patient. Patient discussed with Dr. Shital Acevedo, Attending Attestation to follow  Rose Stauffer MD       CC: Chron's Disease    Subjective  Patient states abdominal pain some worse today with report persistent diarrhea with loose stools 4-5 times daily. Does not known if she is having bloody stools, nursing has not noted rafiq blood with stools. Denies fevers/chills, headaches, chest pain, shortness of breath, palpitations. Noting reduced energy overall.     Inpatient Medications  Current Facility-Administered Medications   Medication Dose Route Frequency    Vancomycin trough at 09:30 on 8/21/17   Other Rx Dosing/Monitoring    ciprofloxacin HCl (CIPRO) tablet 500 mg  500 mg Oral BID    losartan (COZAAR) tablet 25 mg  25 mg Oral DAILY    metroNIDAZOLE (FLAGYL) tablet 500 mg  500 mg Oral TID    acetaminophen (TYLENOL) tablet 650 mg  650 mg Oral Q4H PRN    diphenhydrAMINE (BENADRYL) injection 12.5 mg  12.5 mg IntraVENous Q4H PRN    morphine injection 2 mg  2 mg IntraVENous Q4H PRN    ondansetron (ZOFRAN) injection 4 mg  4 mg IntraVENous Q4H PRN    sodium chloride (NS) flush 5-10 mL  5-10 mL IntraVENous Q8H    sodium chloride (NS) flush 5-10 mL  5-10 mL IntraVENous Q8H    sodium chloride (NS) flush 5-10 mL  5-10 mL IntraVENous PRN    oxyCODONE-acetaminophen (PERCOCET) 5-325 mg per tablet 1 Tab  1 Tab Oral Q4H PRN    naloxone (NARCAN) injection 0.4 mg  0.4 mg IntraVENous PRN    diphenhydrAMINE (BENADRYL) capsule 25 mg  25 mg Oral Q4H PRN    ondansetron (ZOFRAN ODT) tablet 4 mg  4 mg Oral Q4H PRN    docusate sodium (COLACE) capsule 100 mg  100 mg Oral BID    enoxaparin (LOVENOX) injection 40 mg  40 mg SubCUTAneous Q24H    naproxen (NAPROSYN) tablet 500 mg  500 mg Oral BID WITH MEALS    vancomycin (VANCOCIN) 1,500 mg in 0.9% sodium chloride 500 mL IVPB  1,500 mg IntraVENous Q12H         Allergies  Allergies   Allergen Reactions    Codeine Nausea and Vomiting    Sulfa (Sulfonamide Antibiotics) Other (comments)     Makes face flush         Objective  Vitals:  Patient Vitals for the past 8 hrs:   Temp Pulse Resp BP SpO2   08/21/17 1017 - (!) 105 - 132/60 99 %   08/21/17 1007 - (!) 130 - 128/79 99 %   08/21/17 1005 - (!) 116 - 137/79 99 %   08/21/17 0955 - (!) 103 - 117/68 98 %   08/21/17 0811 98.7 °F (37.1 °C) 92 16 118/66 97 %   08/21/17 0358 97.7 °F (36.5 °C) 97 18 107/68 98 %         I/O:    Intake/Output Summary (Last 24 hours) at 08/21/17 1041  Last data filed at 08/21/17 0659   Gross per 24 hour   Intake              500 ml   Output             2100 ml   Net            -1600 ml     Last shift:       Last 3 shifts:    08/19 1901 - 08/21 0700  In: 1900 [I.V.:1900]  Out: 3225 [Urine:3200]    Physical Exam:  General: No acute distress. Alert. Cooperative. Head: Normocephalic. Atraumatic. Respiratory: CTAB. No w/r/r/c.   Cardiovascular: Regular Rhythm, tachycardic. Essential hypertension (Chronic) ICD-10-CM: I10  ICD-9-CM: 401.9  1/4/2017 Yes        Crohn's disease of both small and large intestine without complication (HCC) (Chronic) ICD-10-CM: K50.80  ICD-9-CM: 555.2  1/4/2017 Yes            Will check Ct abdomen while here to assess progress diverticulitis prior to additional immunosuppression. Her history is not of additional visible blood loss since recent discharge. However given identification active colon disease advised additional fall hemoglobin likely recommend blood transfusion. Discussed blood transfusion alternatives, complications.   All questions answered    I have interviewed and examined patient with addendum to note above and formulation care plan    Lana Faust M.D.

## 2017-08-21 NOTE — PROGRESS NOTES
1715 Small, gelatinous bloody clots noted in most recent BM. Previous bowel movements came back positive for occult blood but no rafiq blood noted. Clots are a change. Message relayed to Dr. Abeba Flowers through on-call answering service. Awaiting return call. 65 Discussed patient with Dr. Abeba Flowers - reviewed patient history and current assessment. New order for STAT hemoglobin with order to call Dr. Abeba Flowers back if results at less than 7.     1800 Lab has been drawn and sent by PCT. Need to call MD if Hgb less than 7 relayed to current RN vAe Love).

## 2017-08-21 NOTE — PROGRESS NOTES
Problem: Mobility Impaired (Adult and Pediatric)  Goal: *Acute Goals and Plan of Care (Insert Text)  Physical Therapy Goals  Initiated 8/21/2017  1. Patient will move from supine to sit and sit to supine in bed with independence within 7 day(s). 2. Patient will transfer from bed to chair and chair to bed with modified independence using the least restrictive device within 7 day(s). 3. Patient will perform sit to stand with modified independence within 7 day(s). 4. Patient will ambulate with modified independence for 150 feet with the least restrictive device within 7 day(s). 5. Patient will ascend/descend 4 stairs with 1 handrail(s) with modified independence within 7 day(s). 6. Patient will be independent with HEP for knees within 7 days. PHYSICAL THERAPY EVALUATION  Patient: Edwige Jacobo (23 y.o. female)  Date: 8/21/2017  Primary Diagnosis: Knee pain, left  LEFT KNEE INFECTION  Procedure(s) (LRB):  KNEE ARTHROSCOPY INCISION AND DRAINAGE OF LEFT AND RIGHT KNEE (Left) 1 Day Post-Op   Precautions:   WBAT, Fall      ASSESSMENT :  Based on the objective data described below, the patient presents with decreased ROM and strength to bilateral knees, decreased bed mobility, transfers and functional ambulation, and high pulse rate following admission for I & D to bilateral knees. Patient has history of recent admission from 8/12/17-8/17/17 for diverticulitis and has history of Chron's. She also had syncopal episode during previous admission so PT monitored vitals with changes of position today. Her BP is stable but HR is running high ranging from 103 at rest to 130 with activity. Hgb is 6.8 today and has history of chronic anemia as well. No dizziness with changes of position today. Patient has ace wraps in place to bilateral knees. Per Zuleima Lora, patient is allowed ROM and WBAT to BLE. Patient was instructed in ankle pumps, quad sets, heel slides, heel slides, and SLR and handout provided.  ROM limited to bilateral knees (see below for measurements). Patient stood and ambulated slowly 20 feet around the bed with rolling walker and mod assist of 2 to stand but min assist to ambulate. She declined to sit in chair and was returned to bed at her request. Patient lives in two story home with 2 roommates. 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 She was given crutches in ER this visit but is more safe with rolling walker at this time. Will continue to see daily for exercises and gait as she tolerates. Patient awaiting consult from infectious disease doctor and cultures pending. .     Patient will benefit from skilled intervention to address the above impairments. Patients rehabilitation potential is considered to be Good  Factors which may influence rehabilitation potential include:   [ ]         None noted  [ ]         Mental ability/status  [X]         Medical condition  [ ]         Home/family situation and support systems  [ ]         Safety awareness  [ ]         Pain tolerance/management  [ ]         Other:        PLAN :  Recommendations and Planned Interventions:  [X]           Bed Mobility Training             [ ]    Neuromuscular Re-Education  [X]           Transfer Training                   [ ]    Orthotic/Prosthetic Training  [X]           Gait Training                         [ ]    Modalities  [X]           Therapeutic Exercises           [ ]    Edema Management/Control  [ ]           Therapeutic Activities            [ ]    Patient and Family Training/Education  [ ]           Other (comment):     Frequency/Duration: Patient will be followed by physical therapy  6 times a week to address goals. Discharge Recommendations: Home Health  Further Equipment Recommendations for Discharge: to be determined; may need rolling walker but will see how she progresses       SUBJECTIVE:   Patient stated I am afraid to stand up on these legs.       OBJECTIVE DATA SUMMARY:   HISTORY:    Past Medical History: Diagnosis Date    Crohn's colitis ([facility] [address]      Hypertension      Ill-defined condition       history of  ulcerative colitis     Past Surgical History:   Procedure Laterality Date    COLONOSCOPY N/A 8/14/2017     COLONOSCOPY performed by Ofilia Moritz, MD at [address] [facility] HX APPENDECTOMY         1993    HX CHOLECYSTECTOMY N/A      HX TONSIL AND ADENOIDECTOMY Bilateral      HX TONSIL AND ADENOIDECTOMY        HX WISDOM TEETH EXTRACTION         Prior Level of Function/Home Situation: independent  Personal factors and/or comorbidities impacting plan of care: recent admission for diverticulitis, low Hgb, being worked up for bilateral knee infections. Home Situation  Home Environment: Private residence  # Steps to Enter: 5  Rails to Enter: Yes  One/Two Story Residence: Two story  Living Alone: No (has two roommates)  Support Systems: Friends \ neighbors  Patient Expects to be Discharged to[de-identified] Private residence  Current DME Used/Available at Home: Crutches     EXAMINATION/PRESENTATION/DECISION MAKING:   Critical Behavior:  Neurologic State: Alert  Orientation Level: Oriented X4     Safety/Judgement: Insight into deficits  Hearing: Auditory  Auditory Impairment: None  Skin:  Not full observed     Range Of Motion:           LLE AROM  L Knee Flexion: 50  L Knee Extension: 20     RLE AROM  R Knee Flexion: 58  R Knee Extension: 15        Strength:    Strength: Generally decreased, functional                    Tone & Sensation:   Tone: Normal              Sensation: Intact                        Functional Mobility:  Bed Mobility:     Supine to Sit: Modified independent  Sit to Supine: Modified independent     Transfers:  Sit to Stand: Moderate assistance; Additional time;Assist x2  Stand to Sit: Minimum assistance; Additional time                       Balance:   Sitting: Intact  Standing: Intact; With support  Ambulation/Gait Training:  Distance (ft): 20 Feet (ft)  Assistive Device: Gait belt;Walker, rolling  Ambulation - Level of Assistance: Minimal assistance        Gait Abnormalities: Antalgic  Right Side Weight Bearing: As tolerated  Left Side Weight Bearing: As tolerated  Base of Support: Widened     Speed/Astrid: Slow  Step Length: Left shortened;Right shortened                                                          Therapeutic Exercises: Ankle pumps, quad and heel sets, heel slides, SLR           Physical Therapy Evaluation Charge Determination   History Examination Presentation Decision-Making   MEDIUM  Complexity : 1-2 comorbidities / personal factors will impact the outcome/ POC  MEDIUM Complexity : 3 Standardized tests and measures addressing body structure, function, activity limitation and / or participation in recreation  MEDIUM Complexity : Evolving with changing characteristics  MEDIUM Complexity : FOTO score of 26-74      Based on the above components, the patient evaluation is determined to be of the following complexity level: MEDIUM     Pain:  Pain Scale 1: Numeric (0 - 10)  Pain Intensity 1: 3  Pain Location 1: Knee  Pain Orientation 1: Left;Right  Pain Description 1: Aching  Activity Tolerance:   Limited by pain, high HR with activity. Please refer to the flowsheet for vital signs taken during this treatment. After treatment:   [ ]         Patient left in no apparent distress sitting up in chair  [X]         Patient left in no apparent distress in bed  [X]         Call bell left within reach  [X]         Nursing notified  [ ]         Caregiver present  [X]         Bed alarm activated      COMMUNICATION/EDUCATION:   The patients plan of care was discussed with: Registered Nurse and Physician Assistant. [X]         Fall prevention education was provided and the patient/caregiver indicated understanding. [ ]         Patient/family have participated as able in goal setting and plan of care. [X]         Patient/family agree to work toward stated goals and plan of care.   [ ] Patient understands intent and goals of therapy, but is neutral about his/her participation. [ ]         Patient is unable to participate in goal setting and plan of care.      Thank you for this referral.  Nestor Julien, PT   Time Calculation: 30 mins

## 2017-08-21 NOTE — PROGRESS NOTES
[address] of patient's report of some numbness and tingling in L foot. Positive pedal pulse, warm, good cap refill. New orders to remove both elastic wraps and replace with band-aid/primapore as needed. 1655 ACE wraps and cast padding removed. Two sutures noted to each knee. Slight bloody drainage noted to R knee where cast padding had adhered to incision site. Primapore dressing applied. Drainage to R knee dressing marked at time of change.

## 2017-08-21 NOTE — PROGRESS NOTES
Problem: Self Care Deficits Care Plan (Adult)  Goal: *Acute Goals and Plan of Care (Insert Text)  Occupational Therapy Goals  Initiated 8/21/2017  1. Patient will perform grooming and sponge bathing standing at the sink with supervision/set-up within 7 day(s). 2. Patient will perform lower body dressing with supervision/set-up within 7 day(s). 3. Patient will perform toilet transfer with supervision/set-up and best adaptive equipment within 7 day(s). 4. Patient will perform all aspects of toileting with modified independence within 7 day(s). 5. Patient will participate in upper extremity therapeutic exercise/activities with independence for 10 minutes within 7 day(s). 6. Patient will stand for functional activity > or = 5 minutes with < or = 5/10 pain per patient report within 7 day(7). OCCUPATIONAL THERAPY EVALUATION  Patient: Maximus Tyson (53 y.o. female)  Date: 8/21/2017  Primary Diagnosis: Knee pain, left  LEFT KNEE INFECTION  Septic arthritis of knee, left (HCC)  Procedure(s) (LRB):  KNEE ARTHROSCOPY INCISION AND DRAINAGE OF LEFT AND RIGHT KNEE (Left) 1 Day Post-Op   Precautions:   Fall, WBAT      ASSESSMENT :  Based on the objective data described below, the patient presents with minimal out of bed activity tolerance. Tachy in low 100s at rest, using bed pan for all toileting needs, informed she is having 7-10 loose stools/day since last admit, low hgb and decreased bilateral LE AROM. Will continue to follow and progress as able. Next visit recommend LE ADL's at edge of bed and toilet transfer to bedside commode. Patient will benefit from skilled intervention to address the above impairments.   Patients rehabilitation potential is considered to be Fair  Factors which may influence rehabilitation potential include:   [ ]             None noted  [ ]             Mental ability/status  [X]             Medical condition  [X]             Home/family situation and support systems  [ ]             Safety awareness  [ ]             Pain tolerance/management  [ ]             Other:        PLAN :  Recommendations and Planned Interventions:  [X]               Self Care Training                  [X]        Therapeutic Activities  [X]               Functional Mobility Training    [ ]        Cognitive Retraining  [X]               Therapeutic Exercises           [X]        Endurance Activities  [X]               Balance Training                   [ ]        Neuromuscular Re-Education  [ ]               Visual/Perceptual Training     [X]   Home Safety Training  [X]               Patient Education                 [X]        Family Training/Education  [ ]               Other (comment):     Frequency/Duration: Patient will be followed by occupational therapy 5 times a week to address goals. Discharge Recommendations: Home Health and To Be Determined  Further Equipment Recommendations for Discharge: to be determined       SUBJECTIVE:   Patient stated I just don't think I can do that right now.  re: getting on bedside commode      OBJECTIVE DATA SUMMARY:   HISTORY:   Past Medical History:   Diagnosis Date    Crohn's colitis ([facility]      Hypertension      Ill-defined condition       history of  ulcerative colitis     Past Surgical History:   Procedure Laterality Date    COLONOSCOPY N/A 8/14/2017     COLONOSCOPY performed by Uche Gupta MD at [address] [facility] HX APPENDECTOMY         1993    HX CHOLECYSTECTOMY N/A      HX TONSIL AND ADENOIDECTOMY Bilateral      HX TONSIL AND ADENOIDECTOMY        HX WISDOM TEETH EXTRACTION            Prior Level of Function/Home Situation: lives with 2 roommates, recent admit due to diverticulitis and went home independent  Expanded or extensive additional review of patient history:      Home Situation  Home Environment: Private residence  # Steps to Enter: 5  Rails to Enter: Yes  One/Two Story Residence: Two story  Living Alone: No (has two roommates)  Support Systems: Friends \ neighbors  Patient Expects to be Discharged to[de-identified] Private residence  Current DME Used/Available at Home: Crutches  Tub or Shower Type: Tub/Shower combination  [ ]  Right hand dominant   [ ]  Left hand dominant     EXAMINATION OF PERFORMANCE DEFICITS:  Cognitive/Behavioral Status:  Neurologic State: Alert  Orientation Level: Oriented X4  Cognition: Follows commands; Appropriate safety awareness; Appropriate for age attention/concentration; Appropriate decision making  Perception: Appears intact  Perseveration: No perseveration noted  Safety/Judgement: Awareness of environment; Fall prevention;Home safety; Insight into deficits     Skin: bilateral LE's in ACE bandages     Edema: bilateral LE     Hearing: Auditory  Auditory Impairment: None     Vision/Perceptual:                                Corrective Lenses: Glasses     Range of Motion:                             Strength:  Strength: Within functional limits                 Coordination:     Fine Motor Skills-Upper: Left Intact; Right Intact    Gross Motor Skills-Upper: Left Intact; Right Intact     Tone & Sensation:  Tone: Normal  Sensation: Intact                       Balance:  Sitting: Intact  Standing: Intact; With support     Functional Mobility and Transfers for ADLs:  Bed Mobility:  Rolling: Minimum assistance  Supine to Sit: Minimum assistance  Sit to Supine: Modified independent     Transfers:  Sit to Stand: Total assistance (unable to tolerate at this time)  Stand to Sit: Minimum assistance; Additional time  Bed to Chair: Total assistance  Toilet Transfer : Total assistance; Other (comment) (using bed pan)     ADL Assessment:  Feeding: Independent     Oral Facial Hygiene/Grooming: Setup     Bathing: Moderate assistance     Upper Body Dressing: Setup     Lower Body Dressing: Maximum assistance     Toileting: Maximum assistance; Other (comment) (bed pan)                 ADL Intervention and task modifications:     Educated on role of OT, benefit of increased activity as tolerated, use of bedside commode versus bed pan         Cognitive Retraining  Safety/Judgement: Awareness of environment; Fall prevention;Home safety; Insight into deficits     Therapeutic Exercise: Instructed to perform bilateral UE AROM exercises in bed, glute squeezes    Functional Measure:  Barthel Index:      Bathin  Bladder: 10  Bowels: 5  Groomin  Dressin  Feeding: 10  Mobility: 0  Stairs: 0  Toilet Use: 0  Transfer (Bed to Chair and Back): 5  Total: 40         Barthel and G-code impairment scale:  Percentage of impairment CH  0% CI  1-19% CJ  20-39% CK  40-59% CL  60-79% CM  80-99% CN  100%   Barthel Score 0-100 100 99-80 79-60 59-40 20-39 1-19    0   Barthel Score 0-20 20 17-19 13-16 9-12 5-8 1-4 0      The Barthel ADL Index: Guidelines  1. The index should be used as a record of what a patient does, not as a record of what a patient could do. 2. The main aim is to establish degree of independence from any help, physical or verbal, however minor and for whatever reason. 3. The need for supervision renders the patient not independent. 4. A patient's performance should be established using the best available evidence. Asking the patient, friends/relatives and nurses are the usual sources, but direct observation and common sense are also important. However direct testing is not needed. 5. Usually the patient's performance over the preceding 24-48 hours is important, but occasionally longer periods will be relevant. 6. Middle categories imply that the patient supplies over 50 per cent of the effort. 7. Use of aids to be independent is allowed. Jagdeep Silverman., Barthel, D.W. (0376). Functional evaluation: the Barthel Index. [facility] (14)2. Jr Muro rayray SAAD Carbone, Gianluca Enrique., Jamar Love., [place], [address] Measuring the change indisability after inpatient rehabilitation; comparison of the responsiveness of the Barthel Index and Functional [place] Measure. Journal of Neurology, Neurosurgery, and Psychiatry, 66(4), 598-592. CHICHI Salvador.A, KILEY Franks, & Franko Rolon M.A. (2004.) Assessment of post-stroke quality of life in cost-effectiveness studies: The usefulness of the Barthel Index and the EuroQoL-5D. Quality of Life Research, 13, 961-37            G codes: In compliance with CMSs Claims Based Outcome Reporting, the following G-code set was chosen for this patient based on their primary functional limitation being treated: The outcome measure chosen to determine the severity of the functional limitation was the Barthel Index with a score of 40/100 which was correlated with the impairment scale. · Self Care:               - CURRENT STATUS:    CL - 60%-79% impaired, limited or restricted               - GOAL STATUS:           CK - 40%-59% impaired, limited or restricted               - D/C STATUS:                       ---------------To be determined---------------      Occupational Therapy Evaluation Charge Determination   History Examination Decision-Making   LOW Complexity : Brief history review  LOW Complexity : 1-3 performance deficits relating to physical, cognitive , or psychosocial skils that result in activity limitations and / or participation restrictions  LOW Complexity : No comorbidities that affect functional and no verbal or physical assistance needed to complete eval tasks       Based on the above components, the patient evaluation is determined to be of the following complexity level: LOW   Pain:  Pain Scale 1: Numeric (0 - 10)  Pain Intensity 1: 5  Pain Location 1: Knee  Pain Orientation 1: Left  Pain Description 1: Aching  Pain Intervention(s) 1: Medication (see MAR)  Activity Tolerance:   Poor, due to pain, tachy and fatigue  Please refer to the flowsheet for vital signs taken during this treatment.   After treatment:   [ ] Patient left in no apparent distress sitting up in chair  [X] Patient left in no apparent distress in bed  [X] Call bell left within reach  [X] Nursing notified  [ ] Caregiver present  [ ] Bed alarm activated      COMMUNICATION/EDUCATION:   The patients plan of care was discussed with: Physical Therapist and Registered Nurse.  [X] Home safety education was provided and the patient/caregiver indicated understanding. [X] Patient/family have participated as able in goal setting and plan of care. [ ] Patient/family agree to work toward stated goals and plan of care. [ ] Patient understands intent and goals of therapy, but is neutral about his/her participation. [ ] Patient is unable to participate in goal setting and plan of care. This patients plan of care is appropriate for delegation to [facility].      Thank you for this referral.  Lysle Kussmaul, OTR/L  Time Calculation: 14 mins

## 2017-08-21 NOTE — PROGRESS NOTES
Bedside and Verbal shift change report given to Shaina Rivera (oncoming nurse) by Divya Friday (offgoing nurse). Report included the following information SBAR, Kardex and Recent Results.

## 2017-08-21 NOTE — PROGRESS NOTES
[facility] Dosing Services: Antimicrobial Stewardship Daily Doc    Consult for Vancomycin dosing by Zuleima Ta NP  Indication: Bone and Joint infection  Day of Therapy 3    Ht Readings from Last 1 Encounters:   08/19/17 167.6 cm (66\")        Wt Readings from Last 1 Encounters:   08/19/17 95.3 kg (210 lb)        Vancomycin therapy:  Current maintenance dose: 1500 mg every 12 hours. Last trough level = 7.9 mcg/ml on 8/21/17 at 10:42. Drawn appropriately. Level is sub-therapeutic. PLAN  Change Vancomycin to 1500 mg every 8 hours. Dose calculated to approximate a therapeutic trough of 15-20mcg/mL. Plan for level: Before 4th dose of new regimen. Pharmacist will continue to follow and adjust dose as needed. Other Antimicrobial   (not dosed by pharmacist) Flagyl 500 mg PO TID  Cipro 500 mg PO BID  (Note: 20 day course s/p recent discharge)   Cultures 8/20 Body Fluid (Rt Knee Asp): NGTD (pending)  8/20 Anaerobic: NGTD (pending)  8/19 gram stain:NGTD (FINAL)  8/19 Knee: Pend  8/19 Anaerobic: NGTD (pending)  8/19 Urine: Enterococcus sp > 100 K (pending)  8/15 synovial fluid: NGTD (Pend)   Serum Creatinine Lab Results   Component Value Date/Time    Creatinine 0.79 08/21/2017 02:09 AM         Creatinine Clearance Estimated Creatinine Clearance: 99.1 mL/min (based on Cr of 0.79).      Temp Temp: 98.7 °F (37.1 °C)       WBC Lab Results   Component Value Date/Time    WBC 7.7 08/21/2017 04:06 AM        H/H Lab Results   Component Value Date/Time    HGB 6.8 08/21/2017 04:06 AM        Platelets    Lab Results   Component Value Date/Time    PLATELET 523 06/29/6130 04:06 AM          Pharmacist Westley Ford, PHARMD Contact information: [phone]

## 2017-08-21 NOTE — PHYSICIAN ADVISORY
Letter of Status Determination:   Recommend hospitalization status upgraded from   OBSERVATION  to Inpatient  Status     Pt Name:  Adina Tamez   MR#  [id number]   [facility]#   [id number]   Room and Hospital  [facility]/  @ [address] [facility]   Hospitalization date  8/19/2017 10:14 AM   Current Attending Physician  Tolu Gaona MD   Principal diagnosis  Knee pain, left      Clinicals  50 y.o. y.o  female hospitalized with above diagnosis   The pt has complex medical history of Crohn's disease of her small and large intestine. She presented with symptoms of knee pain and now we learn from her test results that the knee joint effusions are showing 3 to 4+ WBC and pending culture results. Her urine is already growing >100K colonies of GNR. [place] (MCG) criteria   Does  apply   Septic Arthritis  ORG: M-605 (ISC)   STATUS DETERMINATION  Based on documented presenting clinical data, comorbid conditions, high risk of adverse events and deterioration, it is our recommendation that the patient's status should be upgraded from OBSERVATION to INPATIENT status. The final decision of the patient's hospitalization status depends on the attending physician's judgment. Additional comments     Payor: Christopher Russo / Plan: Sumeet Shaw [id number] PPO / Product Type: PPO /         Delmer Townsend MD MPH FACP     Physician Advisor    [address]   [facility] Staff, [address] [place] [address]    Cell  [phone]              .

## 2017-08-22 ENCOUNTER — APPOINTMENT (OUTPATIENT)
Dept: CT IMAGING | Age: 52
DRG: 488 | End: 2017-08-22
Attending: INTERNAL MEDICINE
Payer: COMMERCIAL

## 2017-08-22 LAB
BACTERIA SPEC CULT: ABNORMAL
CC UR VC: ABNORMAL
CREAT SERPL-MCNC: 0.78 MG/DL (ref 0.55–1.02)
DATE LAST DOSE: ABNORMAL
ERYTHROCYTE [DISTWIDTH] IN BLOOD BY AUTOMATED COUNT: 14.2 % (ref 11.5–14.5)
HCT VFR BLD AUTO: 21.1 % (ref 35–47)
HEMOCCULT STL QL: POSITIVE
HGB BLD-MCNC: 6.9 G/DL (ref 11.5–16)
MCH RBC QN AUTO: 30.8 PG (ref 26–34)
MCHC RBC AUTO-ENTMCNC: 32.7 G/DL (ref 30–36.5)
MCV RBC AUTO: 94.2 FL (ref 80–99)
PLATELET # BLD AUTO: 323 K/UL (ref 150–400)
RBC # BLD AUTO: 2.24 M/UL (ref 3.8–5.2)
REPORTED DOSE,DOSE: ABNORMAL UNITS
REPORTED DOSE/TIME,TMG: ABNORMAL
SERVICE CMNT-IMP: ABNORMAL
VANCOMYCIN TROUGH SERPL-MCNC: 17.9 UG/ML (ref 5–10)
WBC # BLD AUTO: 5.5 K/UL (ref 3.6–11)

## 2017-08-22 PROCEDURE — 85027 COMPLETE CBC AUTOMATED: CPT | Performed by: INTERNAL MEDICINE

## 2017-08-22 PROCEDURE — 80202 ASSAY OF VANCOMYCIN: CPT | Performed by: NURSE PRACTITIONER

## 2017-08-22 PROCEDURE — 74011250636 HC RX REV CODE- 250/636: Performed by: NURSE PRACTITIONER

## 2017-08-22 PROCEDURE — 36415 COLL VENOUS BLD VENIPUNCTURE: CPT | Performed by: ORTHOPAEDIC SURGERY

## 2017-08-22 PROCEDURE — 65270000029 HC RM PRIVATE

## 2017-08-22 PROCEDURE — 36430 TRANSFUSION BLD/BLD COMPNT: CPT

## 2017-08-22 PROCEDURE — 74011636320 HC RX REV CODE- 636/320: Performed by: RADIOLOGY

## 2017-08-22 PROCEDURE — P9016 RBC LEUKOCYTES REDUCED: HCPCS | Performed by: PHYSICIAN ASSISTANT

## 2017-08-22 PROCEDURE — 97110 THERAPEUTIC EXERCISES: CPT

## 2017-08-22 PROCEDURE — 74177 CT ABD & PELVIS W/CONTRAST: CPT

## 2017-08-22 PROCEDURE — 86901 BLOOD TYPING SEROLOGIC RH(D): CPT | Performed by: PHYSICIAN ASSISTANT

## 2017-08-22 PROCEDURE — 82272 OCCULT BLD FECES 1-3 TESTS: CPT | Performed by: INTERNAL MEDICINE

## 2017-08-22 PROCEDURE — 74011250637 HC RX REV CODE- 250/637: Performed by: NURSE PRACTITIONER

## 2017-08-22 PROCEDURE — 82565 ASSAY OF CREATININE: CPT | Performed by: ORTHOPAEDIC SURGERY

## 2017-08-22 PROCEDURE — 77030029131 HC ADMN ST IV BLD N DEHP ICUM -B

## 2017-08-22 PROCEDURE — 86923 COMPATIBILITY TEST ELECTRIC: CPT | Performed by: PHYSICIAN ASSISTANT

## 2017-08-22 RX ORDER — SODIUM CHLORIDE 9 MG/ML
250 INJECTION, SOLUTION INTRAVENOUS AS NEEDED
Status: DISCONTINUED | OUTPATIENT
Start: 2017-08-22 | End: 2017-08-30 | Stop reason: HOSPADM

## 2017-08-22 RX ADMIN — NAPROXEN 500 MG: 250 TABLET ORAL at 08:32

## 2017-08-22 RX ADMIN — Medication 10 ML: at 06:13

## 2017-08-22 RX ADMIN — LOSARTAN POTASSIUM 25 MG: 50 TABLET, FILM COATED ORAL at 08:31

## 2017-08-22 RX ADMIN — VANCOMYCIN HYDROCHLORIDE 1500 MG: 10 INJECTION, POWDER, LYOPHILIZED, FOR SOLUTION INTRAVENOUS at 21:17

## 2017-08-22 RX ADMIN — VANCOMYCIN HYDROCHLORIDE 1500 MG: 10 INJECTION, POWDER, LYOPHILIZED, FOR SOLUTION INTRAVENOUS at 13:30

## 2017-08-22 RX ADMIN — METRONIDAZOLE 500 MG: 250 TABLET, FILM COATED ORAL at 17:21

## 2017-08-22 RX ADMIN — Medication 10 ML: at 06:12

## 2017-08-22 RX ADMIN — METRONIDAZOLE 500 MG: 250 TABLET, FILM COATED ORAL at 22:19

## 2017-08-22 RX ADMIN — OXYCODONE HYDROCHLORIDE AND ACETAMINOPHEN 1 TABLET: 5; 325 TABLET ORAL at 04:26

## 2017-08-22 RX ADMIN — OXYCODONE HYDROCHLORIDE AND ACETAMINOPHEN 1 TABLET: 5; 325 TABLET ORAL at 12:37

## 2017-08-22 RX ADMIN — VANCOMYCIN HYDROCHLORIDE 1500 MG: 10 INJECTION, POWDER, LYOPHILIZED, FOR SOLUTION INTRAVENOUS at 04:26

## 2017-08-22 RX ADMIN — OXYCODONE HYDROCHLORIDE AND ACETAMINOPHEN 1 TABLET: 5; 325 TABLET ORAL at 19:53

## 2017-08-22 RX ADMIN — CIPROFLOXACIN HYDROCHLORIDE 500 MG: 500 TABLET, FILM COATED ORAL at 08:33

## 2017-08-22 RX ADMIN — CIPROFLOXACIN HYDROCHLORIDE 500 MG: 500 TABLET, FILM COATED ORAL at 17:20

## 2017-08-22 RX ADMIN — Medication 10 ML: at 15:52

## 2017-08-22 RX ADMIN — IOPAMIDOL 95 ML: 755 INJECTION, SOLUTION INTRAVENOUS at 12:09

## 2017-08-22 RX ADMIN — DIATRIZOATE MEGLUMINE AND DIATRIZOATE SODIUM 30 ML: 660; 100 LIQUID ORAL; RECTAL at 08:33

## 2017-08-22 RX ADMIN — Medication 10 ML: at 21:21

## 2017-08-22 RX ADMIN — METRONIDAZOLE 500 MG: 250 TABLET, FILM COATED ORAL at 08:31

## 2017-08-22 RX ADMIN — NAPROXEN 500 MG: 250 TABLET ORAL at 17:20

## 2017-08-22 NOTE — PROGRESS NOTES
Attempted to see patient but she is out of room for CT scan. Patient with low Hgb at 6.9 today and scheduled for two units of blood. Will try to see for OT later today as able.

## 2017-08-22 NOTE — PROGRESS NOTES
Sravan Loredo [facility] [address]  [address] [facility], [place], [address] [place] [address]  [phone]      Medical Progress Note      NAME: Franky Ponce   :  1965  MRM:  [id number]    Date/Time: 2017  7:58 AM        Subjective:     Chief Complaint:  Pain: bilateral knees, moderate, constant, better with pain meds. Had several BMs with gelatinous clots overnight    ROS:  (bold if positive, if negative)                        Tolerating PT  Tolerating Diet          Objective:       Vitals:          Last 24hrs VS reviewed since prior progress note.  Most recent are:    Visit Vitals    /77 (BP 1 Location: Left arm, BP Patient Position: At rest)    Pulse 71    Temp 97.5 °F (36.4 °C)    Resp 16    Ht 5' 6\" (1.676 m)    Wt 95.3 kg (210 lb)    SpO2 99%    BMI 33.89 kg/m2     SpO2 Readings from Last 6 Encounters:   17 99%   17 93%   17 98%   17 98%   17 96%    O2 Flow Rate (L/min): 2 l/min       Intake/Output Summary (Last 24 hours) at 17 0758  Last data filed at 17 0615   Gross per 24 hour   Intake                0 ml   Output              650 ml   Net             -650 ml          Exam:     Physical Exam:    Gen:  Well-developed, obese, in no acute distress  HEENT:  Pink conjunctivae, PERRL, hearing intact to voice, moist mucous membranes  Neck:  Supple, without masses, thyroid non-tender  Resp:  No accessory muscle use, clear breath sounds without wheezes rales or rhonchi  Card:  No murmurs, normal S1, S2 without thrills, bruits or peripheral edema  Abd:  Soft, non-tender, non-distended, normoactive bowel sounds are present, no palpable organomegaly and no detectable hernias  Lymph:  No cervical or inguinal adenopathy  Musc:  No cyanosis or clubbing  Skin:  No rashes or ulcers, skin turgor is good, bilateral legs wrapped with ACE  Neuro:  Cranial nerves are grossly intact, no focal motor weakness, follows commands appropriately  Psych:  Good insight, oriented to person, place and time, alert       Medications Reviewed: (see below)    Lab Data Reviewed: (see below)    ______________________________________________________________________    Medications:     Current Facility-Administered Medications   Medication Dose Route Frequency    iopamidol (ISOVUE-370) 76 % injection 100 mL  100 mL IntraVENous RAD ONCE    diatrizoate meglumine-d.sodium (MD-GASTROVIEW,GASTROGRAFIN) 66-10 % contrast solution 30 mL  30 mL Oral ONCE    vancomycin (VANCOCIN) 1,500 mg in 0.9% sodium chloride 500 mL IVPB  1,500 mg IntraVENous Q8H    ciprofloxacin HCl (CIPRO) tablet 500 mg  500 mg Oral BID    losartan (COZAAR) tablet 25 mg  25 mg Oral DAILY    metroNIDAZOLE (FLAGYL) tablet 500 mg  500 mg Oral TID    acetaminophen (TYLENOL) tablet 650 mg  650 mg Oral Q4H PRN    diphenhydrAMINE (BENADRYL) injection 12.5 mg  12.5 mg IntraVENous Q4H PRN    morphine injection 2 mg  2 mg IntraVENous Q4H PRN    ondansetron (ZOFRAN) injection 4 mg  4 mg IntraVENous Q4H PRN    sodium chloride (NS) flush 5-10 mL  5-10 mL IntraVENous Q8H    sodium chloride (NS) flush 5-10 mL  5-10 mL IntraVENous Q8H    sodium chloride (NS) flush 5-10 mL  5-10 mL IntraVENous PRN    oxyCODONE-acetaminophen (PERCOCET) 5-325 mg per tablet 1 Tab  1 Tab Oral Q4H PRN    naloxone (NARCAN) injection 0.4 mg  0.4 mg IntraVENous PRN    diphenhydrAMINE (BENADRYL) capsule 25 mg  25 mg Oral Q4H PRN    ondansetron (ZOFRAN ODT) tablet 4 mg  4 mg Oral Q4H PRN    docusate sodium (COLACE) capsule 100 mg  100 mg Oral BID    naproxen (NAPROSYN) tablet 500 mg  500 mg Oral BID WITH MEALS            Lab Review:     Recent Labs      08/21/17   1758  08/21/17   0406  08/19/17   2004 08/19/17 1629   WBC   --   7.7  14.1*  12.9*   HGB  7.0*  6.8*  8.4*  8.9*   HCT   --   22.0*  26.6*  27.2*   PLT   --   320  332  328     Recent Labs      08/22/17   0117  08/21/17   0209  08/20/17   0518  08/19/17 2004 08/19/17 1629   NA   -- 141   --   140  137   K   --   3.9   --   4.1  4.1   CL   --   109*   --   104  104   CO2   --   28   --   25  30   GLU   --   109*   --   134*  140*   BUN   --   7   --   6  5*   CREA  0.78  0.79  0.82  0.79  0.85   CA   --   8.1*   --   8.6  8.5   MG   --    --    --   1.9   --      No results found for: GLUCPOC  No results for input(s): PH, PCO2, PO2, HCO3, FIO2 in the last 72 hours. No results for input(s): INR in the last 72 hours. No lab exists for component: INREXT, INREXT  No results found for: SDES  Lab Results   Component Value Date/Time    Culture result: NO GROWTH AFTER 21 HOURS 08/20/2017 09:15 AM    Culture result: PENDING 08/20/2017 09:15 AM    Culture result: No growth thus far, holding 14 days. 08/19/2017 12:30 PM    Culture result: Culture performed on Unspun Fluid 08/19/2017 12:30 PM    Culture result: No growth thus far, holding 14 days. 08/19/2017 12:30 PM            Assessment:     Principal Problem:    Knee pain, left (8/19/2017)    Active Problems:    Essential hypertension (1/4/2017)      Crohn's disease of both small and large intestine without complication ([address] (6/0/6474)      Anemia (8/19/2017)      Debilitated patient (8/19/2017)      Obese (8/19/2017)      UTI (urinary tract infection) (8/19/2017)      Septic arthritis of knee, left ([address] (8/21/2017)           Plan:      Active Problems:    Essential hypertension (1/4/2017)   - BP okay, follow on meds as above      Crohn's disease of both small and large intestine without complication ([address] (9/5/5143)   - having some GI bleeding overnight   - stopped enoxaparin   - follow Hgb    - GI aware      Anemia (8/19/2017)   - Hgb pending   - may have chronic anemia due to Crohns and may have an acute component with evidence of GI blood loss overnight   - check anemia labs      Debilitated patient (8/19/2017)   - mobilize with PT/OT      Obese (8/19/2017)   - counseled on weight loss       UTI (urinary tract infection) (8/19/2017)   - Enterococcus on urine culture   - covered for now with vancomycin, await final culture      Knee pain, left (8/19/2017)   - management per Orthopedics, cultures negative so far      Total time spent with patient: 25 minutes                  Care Plan discussed with: Patient, Care Manager and Nursing Staff    Discussed:  Code Status, Care Plan and D/C Planning    Prophylaxis:  Lovenox    Disposition:   PT, OT, RN           ___________________________________________________    Attending Physician: Kathe Álvarez MD

## 2017-08-22 NOTE — PROGRESS NOTES
AZEEM Gomez notified of patient's readmission to [facility]/Nayeli Collado Manager of Case Management

## 2017-08-22 NOTE — PROGRESS NOTES
Gastrointestinal Progress Note    8/22/2017    Admit Date: 8/19/2017    Subjective:     New Complaints Today: patient states fatigued, also having some intermittent shivering but does not feel cold (reviewed vitals and no fever recorded). She also describes a numb sensation in her left heel that she notices with movement. Pain: LLQ abdominal pain still present but improved. Nausea but no vomiting.      Bowel Movements: Multiple BM overnight and report of some bloody clots mixed in stool    Repeat Hgb this AM 6.9    Repeated discussion medications useable Crohn's disease; in past had used 6mp plus mesalamine without adverse side effect    Current Facility-Administered Medications   Medication Dose Route Frequency    iopamidol (ISOVUE-370) 76 % injection 100 mL  100 mL IntraVENous RAD ONCE    0.9% sodium chloride infusion 250 mL  250 mL IntraVENous PRN    vancomycin (VANCOCIN) 1,500 mg in 0.9% sodium chloride 500 mL IVPB  1,500 mg IntraVENous Q8H    ciprofloxacin HCl (CIPRO) tablet 500 mg  500 mg Oral BID    losartan (COZAAR) tablet 25 mg  25 mg Oral DAILY    metroNIDAZOLE (FLAGYL) tablet 500 mg  500 mg Oral TID    acetaminophen (TYLENOL) tablet 650 mg  650 mg Oral Q4H PRN    diphenhydrAMINE (BENADRYL) injection 12.5 mg  12.5 mg IntraVENous Q4H PRN    morphine injection 2 mg  2 mg IntraVENous Q4H PRN    ondansetron (ZOFRAN) injection 4 mg  4 mg IntraVENous Q4H PRN    sodium chloride (NS) flush 5-10 mL  5-10 mL IntraVENous Q8H    sodium chloride (NS) flush 5-10 mL  5-10 mL IntraVENous Q8H    sodium chloride (NS) flush 5-10 mL  5-10 mL IntraVENous PRN    oxyCODONE-acetaminophen (PERCOCET) 5-325 mg per tablet 1 Tab  1 Tab Oral Q4H PRN    naloxone (NARCAN) injection 0.4 mg  0.4 mg IntraVENous PRN    diphenhydrAMINE (BENADRYL) capsule 25 mg  25 mg Oral Q4H PRN    ondansetron (ZOFRAN ODT) tablet 4 mg  4 mg Oral Q4H PRN    docusate sodium (COLACE) capsule 100 mg  100 mg Oral BID    naproxen (NAPROSYN) tablet 500 mg  500 mg Oral BID WITH MEALS        Objective:     Blood pressure 129/72, pulse 90, temperature 98.2 °F (36.8 °C), resp. rate 18, height 5' 6\" (1.676 m), weight 95.3 kg (210 lb), last menstrual period 07/12/2017, SpO2 99 %. 08/20 1901 - 08/22 0700  In: 500 [I.V.:500]  Out: 2250 [Urine:2250]    EXAM:  GENERAL: alert, cooperative, no distress, pale HEART: regular rate and rhythm, S1, S2 normal, no murmur, click, rub or gallop, LUNGS: chest clear, no wheezing, rales, normal symmetric air entry, ABDOMEN: bowel sounds present, soft, nondistended, tender across lower abdomen (worse in LLQ). No peritoneal signs.  EXTREMITY: clean dressing on both knees, no lower extremity edema, sensation intact    Data Review    Recent Results (from the past 24 hour(s))   OCCULT BLOOD, STOOL    Collection Time: 08/21/17 12:02 PM   Result Value Ref Range    Occult blood, stool POSITIVE (A) NEG     OCCULT BLOOD, STOOL    Collection Time: 08/21/17  2:36 PM   Result Value Ref Range    Occult blood, stool POSITIVE (A) NEG     OCCULT BLOOD, STOOL    Collection Time: 08/21/17  5:06 PM   Result Value Ref Range    Occult blood, stool POSITIVE (A) NEG     HEMOGLOBIN    Collection Time: 08/21/17  5:58 PM   Result Value Ref Range    HGB 7.0 (L) 11.5 - 16.0 g/dL   OCCULT BLOOD, STOOL    Collection Time: 08/21/17  7:26 PM   Result Value Ref Range    Occult blood, stool POSITIVE (A) NEG     OCCULT BLOOD, STOOL    Collection Time: 08/21/17 10:25 PM   Result Value Ref Range    Occult blood, stool POSITIVE (A) NEG     OCCULT BLOOD, STOOL    Collection Time: 08/22/17  1:12 AM   Result Value Ref Range    Occult blood, stool POSITIVE (A) NEG     CREATININE    Collection Time: 08/22/17  1:17 AM   Result Value Ref Range    Creatinine 0.78 0.55 - 1.02 MG/DL    GFR est AA >60 >60 ml/min/1.73m2    GFR est non-AA >60 >60 ml/min/1.73m2   CBC W/O DIFF    Collection Time: 08/22/17  8:14 AM   Result Value Ref Range    WBC 5.5 3.6 - 11.0 K/uL    RBC 2.24 (L) 3.80 - 5.20 M/uL    HGB 6.9 (L) 11.5 - 16.0 g/dL    HCT 21.1 (L) 35.0 - 47.0 %    MCV 94.2 80.0 - 99.0 FL    MCH 30.8 26.0 - 34.0 PG    MCHC 32.7 30.0 - 36.5 g/dL    RDW 14.2 11.5 - 14.5 %    PLATELET 080 992 - 037 K/uL       Assessment:     Principal Problem:    Knee pain, left (8/19/2017)    Active Problems:    Essential hypertension (1/4/2017)      Crohn's disease of both small and large intestine without complication ([facility] [address] (8/0/8961)      Anemia (8/19/2017)      Debilitated patient (8/19/2017)      Obese (8/19/2017)      UTI (urinary tract infection) (8/19/2017)      Septic arthritis of knee, left ([facility] [address] (8/21/2017)        Plan:     1.  transfuse 2 units PRBC (repeat Hgb 6.9) - fatigue and bloody stool overnight; discussed blood transfusions, risk, and alternatives with patient. Agreeable for blood transfusion. 2.  Continue with IV antibiotics  3.   Repeat CT scan scheduled for today    Sabrina Solorio  08/22/17  10:48 AM    I have interviewed and examined patient with addendum to note above and formulation care plan    Alfredo Jeffries M.D.

## 2017-08-22 NOTE — PROGRESS NOTES
Bedside and Verbal shift change report given to [address] (oncoming nurse) by Mauro Valentine RN (offgoing nurse). Report included the following information SBAR, Kardex, OR Summary, Procedure Summary, Intake/Output, MAR and Recent Results.

## 2017-08-22 NOTE — PROGRESS NOTES
Orthopaedic Progress Note  Post Op day: 2 Days Post-Op    2017 3:28 PM     Patient: Adina Tamez MRN: [id number]  SSN: [id number]    YOB: 1965  Age: 50 y.o. Sex: female      Admit date:  2017  Date of Surgery:  2017   Procedures:  Procedure(s):  KNEE ARTHROSCOPY INCISION AND DRAINAGE OF LEFT AND RIGHT KNEE  Admitting Physician:  Tolu Gaona MD   Surgeon:  Yogi Gann) and Role:     * Tolu Gaona MD - Primary    Consulting Physician(s): Treatment Team: Attending Provider: Tolu Gaona MD; Nurse Practitioner: Buddy Machuca NP; Consulting Provider: Krista Vaughn NP; Consulting Provider: Tolu Gaona MD; Utilization Review: Frankey Epp, RN; Consulting Provider: Shelbi Mayfield MD; Utilization Review: Gagan Faith RN; Care Manager: Thiago Meyer    SUBJECTIVE:     Adina Tamez is a 50 y.o. female is 2 Days Post-Op s/p Procedure(s):  KNEE ARTHROSCOPY INCISION AND DRAINAGE OF LEFT AND RIGHT KNEE with an appropriate level of post-operative pain. No complaints of nausea, vomiting, dizziness, lightheadedness, chest pain, or shortness of breath. Both knees feel \"stiff\". Cultures still negative. OBJECTIVE:       Physical Exam:  General: Alert, cooperative, no distress. Respiratory: Respirations unlabored  Neurological:  Neurovascular exam within normal limits. Motor: + DF/PF. Musculoskeletal: Calves soft, supple, non-tender upon palpation. Dressing/Wound:  Clean, dry and intact. No significant erythema or swelling.       Vital Signs:      Patient Vitals for the past 8 hrs:   BP Temp Pulse Resp SpO2   17 1246 132/71 98.6 °F (37 °C) 71 20 100 %   17 0800 129/72 98.2 °F (36.8 °C) 90 18 99 %                                          Temp (24hrs), Av.3 °F (36.8 °C), Min:97.5 °F (36.4 °C), Max:99 °F (37.2 °C)      Labs:        Recent Labs      17   0814   HCT  21.1*   HGB  6.9*     Lab Results   Component Value Date/Time    Sodium 141 08/21/2017 02:09 AM    Potassium 3.9 08/21/2017 02:09 AM    Chloride 109 08/21/2017 02:09 AM    CO2 28 08/21/2017 02:09 AM    Glucose 109 08/21/2017 02:09 AM    BUN 7 08/21/2017 02:09 AM    Creatinine 0.78 08/22/2017 01:17 AM    Calcium 8.1 08/21/2017 02:09 AM       PT/OT:        Gait  Base of Support: Widened  Speed/Astrid: Slow  Step Length: Left shortened, Right shortened  Gait Abnormalities: Antalgic  Ambulation - Level of Assistance: Minimal assistance  Distance (ft): 20 Feet (ft)  Assistive Device: Gait belt, Walker, rolling       Patient mobility  Bed Mobility Training  Rolling: Minimum assistance  Supine to Sit: Minimum assistance  Sit to Supine: Modified independent  Transfer Training  Sit to Stand: Total assistance (unable to tolerate at this time)  Stand to Sit: Minimum assistance, Additional time  Bed to Chair: Total assistance      Gait Training  Assistive Device: Gait belt, Walker, rolling  Ambulation - Level of Assistance: Minimal assistance  Distance (ft): 20 Feet (ft)   Weight Bearing Status  Right Side Weight Bearing: As tolerated  Left Side Weight Bearing: As tolerated        ASSESSMENT / PLAN:   Principal Problem:    Knee pain, left (8/19/2017)    Active Problems:    Essential hypertension (1/4/2017)      Crohn's disease of both small and large intestine without complication ([address] (2/3/3552)      Anemia (8/19/2017)      Debilitated patient (8/19/2017)      Obese (8/19/2017)      UTI (urinary tract infection) (8/19/2017)      Septic arthritis of knee, left ([address] (8/21/2017)                    Pain Control: Adequate with oral agents and PRN IV narcotics   DVT Prophylaxis: Lovenox discontinued due to blood loss anemia, SCDs, JAMEEL Hose    Therapy/ Weight bearing: On hold due to HGB   Wound/ incisional issues: CDI   Medical concerns: Anemia: CT today   Disposition: TBD     Signed By:  Leona Hammer NP    [address] [facility] [phone]

## 2017-08-22 NOTE — PROGRESS NOTES
Bedside and Verbal shift change report given to Johnny Gomez RN (oncoming nurse) by Zbigniew Simpson RN (offgoing nurse). Report included the following information SBAR, Kardex, Procedure Summary, Intake/Output, MAR and Recent Results. Off-going RN to start first unit of PRBs and stay for first 15minutes.

## 2017-08-22 NOTE — PROGRESS NOTES
Problem: Mobility Impaired (Adult and Pediatric)  Goal: *Acute Goals and Plan of Care (Insert Text)  Physical Therapy Goals  Initiated 8/21/2017  1. Patient will move from supine to sit and sit to supine in bed with independence within 7 day(s). 2. Patient will transfer from bed to chair and chair to bed with modified independence using the least restrictive device within 7 day(s). 3. Patient will perform sit to stand with modified independence within 7 day(s). 4. Patient will ambulate with modified independence for 150 feet with the least restrictive device within 7 day(s). 5. Patient will ascend/descend 4 stairs with 1 handrail(s) with modified independence within 7 day(s). 6. Patient will be independent with HEP for knees within 7 days. PHYSICAL THERAPY TREATMENT  Patient: Franky Ponce (42 y.o. female)  Date: 8/22/2017  Diagnosis: Knee pain, left  LEFT KNEE INFECTION  Septic arthritis of knee, left (HCC) Knee pain, left  Procedure(s) (LRB):  KNEE ARTHROSCOPY INCISION AND DRAINAGE OF LEFT AND RIGHT KNEE (Left) 2 Days Post-Op  Precautions: Fall, WBAT      ASSESSMENT:  Patient agreeable to exercises only at this time. States she is about to get blood, is hungry and does not feel like getting up right now. Patient performed bed exercises to BLE's including ankle pumps, quad sets, SAQ, SLR and heel slides, 10 reps each one with min assist. ROM remains limited due to pain with activity. PT placed towel rolls under ankles to facilitate extension and ice packs to bilateral knees. Progression toward goals:  [ ]    Improving appropriately and progressing toward goals  [X]    Improving slowly and progressing toward goals  [ ]    Not making progress toward goals and plan of care will be adjusted       PLAN:  Patient continues to benefit from skilled intervention to address the above impairments. Continue treatment per established plan of care.   Discharge Recommendations:  Home Health versus Outpatient  Further Equipment Recommendations for Discharge: To be determined. SUBJECTIVE:   Patient stated I was going for a CT scan so I have not eaten all day.       OBJECTIVE DATA SUMMARY:   Critical Behavior:  Neurologic State: Alert  Orientation Level: Oriented X4  Cognition: Appropriate decision making  Safety/Judgement: Awareness of environment, Fall prevention, Home safety, Insight into deficits  Functional Mobility Training:                                               Mobility not attempted as patient willing to do exercises only. Therapeutic Exercises:   See list in note above. AROM:   Right knee flexion: 68  Right knee extension: 8  Left knee flexion: 52  Left knee extension: 10  Pain:  Pain Scale 1: Numeric (0 - 10)  Pain Intensity 1: 3  Pain Location 1: Knee;Back  Pain Orientation 1: Left;Right  Activity Tolerance:   Limited by pain with activity. Please refer to the flowsheet for vital signs taken during this treatment.   After treatment:   [ ]    Patient left in no apparent distress sitting up in chair  [X]    Patient left in no apparent distress in bed  [X]    Call bell left within reach  [X]    Nursing notified  [ ]    Caregiver present  [ ]    Bed alarm activated      COMMUNICATION/COLLABORATION:   The patients plan of care was discussed with: Registered Nurse     Candyce Snellen, PT   Time Calculation: 21 mins

## 2017-08-22 NOTE — PROGRESS NOTES
Attempted to see patient but she is out of room for CT scan. Patient with low Hgb at 6.9 today and scheduled for two units of blood. Will try to see for PT later today, at least for ROM to knees and ambulation if feasible. Nursing aware.

## 2017-08-23 LAB
ANION GAP SERPL CALC-SCNC: 4 MMOL/L (ref 5–15)
BUN SERPL-MCNC: 6 MG/DL (ref 6–20)
BUN/CREAT SERPL: 8 (ref 12–20)
CALCIUM SERPL-MCNC: 8.3 MG/DL (ref 8.5–10.1)
CHLORIDE SERPL-SCNC: 108 MMOL/L (ref 97–108)
CO2 SERPL-SCNC: 29 MMOL/L (ref 21–32)
CREAT SERPL-MCNC: 0.75 MG/DL (ref 0.55–1.02)
ERYTHROCYTE [DISTWIDTH] IN BLOOD BY AUTOMATED COUNT: 15.1 % (ref 11.5–14.5)
GLUCOSE SERPL-MCNC: 110 MG/DL (ref 65–100)
HCT VFR BLD AUTO: 24.5 % (ref 35–47)
HGB BLD-MCNC: 8 G/DL (ref 11.5–16)
MCH RBC QN AUTO: 30.3 PG (ref 26–34)
MCHC RBC AUTO-ENTMCNC: 32.7 G/DL (ref 30–36.5)
MCV RBC AUTO: 92.8 FL (ref 80–99)
PLATELET # BLD AUTO: 287 K/UL (ref 150–400)
POTASSIUM SERPL-SCNC: 3.5 MMOL/L (ref 3.5–5.1)
RBC # BLD AUTO: 2.64 M/UL (ref 3.8–5.2)
SODIUM SERPL-SCNC: 141 MMOL/L (ref 136–145)
WBC # BLD AUTO: 6.8 K/UL (ref 3.6–11)

## 2017-08-23 PROCEDURE — 74011250637 HC RX REV CODE- 250/637: Performed by: NURSE PRACTITIONER

## 2017-08-23 PROCEDURE — 74011250637 HC RX REV CODE- 250/637: Performed by: FAMILY MEDICINE

## 2017-08-23 PROCEDURE — 74011250636 HC RX REV CODE- 250/636: Performed by: FAMILY MEDICINE

## 2017-08-23 PROCEDURE — 97116 GAIT TRAINING THERAPY: CPT

## 2017-08-23 PROCEDURE — 36415 COLL VENOUS BLD VENIPUNCTURE: CPT | Performed by: INTERNAL MEDICINE

## 2017-08-23 PROCEDURE — P9016 RBC LEUKOCYTES REDUCED: HCPCS | Performed by: PHYSICIAN ASSISTANT

## 2017-08-23 PROCEDURE — 65270000029 HC RM PRIVATE

## 2017-08-23 PROCEDURE — 74011250636 HC RX REV CODE- 250/636: Performed by: INTERNAL MEDICINE

## 2017-08-23 PROCEDURE — 85027 COMPLETE CBC AUTOMATED: CPT | Performed by: INTERNAL MEDICINE

## 2017-08-23 PROCEDURE — 97530 THERAPEUTIC ACTIVITIES: CPT

## 2017-08-23 PROCEDURE — 74011000258 HC RX REV CODE- 258: Performed by: INTERNAL MEDICINE

## 2017-08-23 PROCEDURE — 97535 SELF CARE MNGMENT TRAINING: CPT

## 2017-08-23 PROCEDURE — 36430 TRANSFUSION BLD/BLD COMPNT: CPT

## 2017-08-23 PROCEDURE — 74011250636 HC RX REV CODE- 250/636: Performed by: NURSE PRACTITIONER

## 2017-08-23 PROCEDURE — 80048 BASIC METABOLIC PNL TOTAL CA: CPT | Performed by: INTERNAL MEDICINE

## 2017-08-23 RX ORDER — MESALAMINE 400 MG/1
800 CAPSULE, DELAYED RELEASE ORAL 3 TIMES DAILY
Status: DISCONTINUED | OUTPATIENT
Start: 2017-08-23 | End: 2017-08-30 | Stop reason: HOSPADM

## 2017-08-23 RX ORDER — AZATHIOPRINE 50 MG/1
100 TABLET ORAL
Status: DISCONTINUED | OUTPATIENT
Start: 2017-08-23 | End: 2017-08-29

## 2017-08-23 RX ORDER — VANCOMYCIN/0.9 % SOD CHLORIDE 1.5G/250ML
1500 PLASTIC BAG, INJECTION (ML) INTRAVENOUS EVERY 8 HOURS
Status: DISCONTINUED | OUTPATIENT
Start: 2017-08-23 | End: 2017-08-26 | Stop reason: DRUGHIGH

## 2017-08-23 RX ADMIN — METRONIDAZOLE 500 MG: 250 TABLET, FILM COATED ORAL at 16:45

## 2017-08-23 RX ADMIN — MESALAMINE 800 MG: 400 CAPSULE, DELAYED RELEASE ORAL at 20:41

## 2017-08-23 RX ADMIN — CEFTRIAXONE 2 G: 2 INJECTION, POWDER, FOR SOLUTION INTRAMUSCULAR; INTRAVENOUS at 17:55

## 2017-08-23 RX ADMIN — AZATHIOPRINE 100 MG: 50 TABLET ORAL at 10:53

## 2017-08-23 RX ADMIN — MESALAMINE 800 MG: 400 CAPSULE, DELAYED RELEASE ORAL at 16:45

## 2017-08-23 RX ADMIN — Medication 10 ML: at 20:42

## 2017-08-23 RX ADMIN — OXYCODONE HYDROCHLORIDE AND ACETAMINOPHEN 1 TABLET: 5; 325 TABLET ORAL at 20:42

## 2017-08-23 RX ADMIN — MESALAMINE 800 MG: 400 CAPSULE, DELAYED RELEASE ORAL at 10:48

## 2017-08-23 RX ADMIN — NAPROXEN 500 MG: 250 TABLET ORAL at 09:34

## 2017-08-23 RX ADMIN — LOSARTAN POTASSIUM 25 MG: 50 TABLET, FILM COATED ORAL at 09:33

## 2017-08-23 RX ADMIN — METRONIDAZOLE 500 MG: 250 TABLET, FILM COATED ORAL at 20:41

## 2017-08-23 RX ADMIN — Medication 10 ML: at 06:15

## 2017-08-23 RX ADMIN — VANCOMYCIN HYDROCHLORIDE 1500 MG: 10 INJECTION, POWDER, LYOPHILIZED, FOR SOLUTION INTRAVENOUS at 06:14

## 2017-08-23 RX ADMIN — NAPROXEN 500 MG: 250 TABLET ORAL at 16:45

## 2017-08-23 RX ADMIN — CIPROFLOXACIN HYDROCHLORIDE 500 MG: 500 TABLET, FILM COATED ORAL at 09:33

## 2017-08-23 RX ADMIN — OXYCODONE HYDROCHLORIDE AND ACETAMINOPHEN 1 TABLET: 5; 325 TABLET ORAL at 10:48

## 2017-08-23 RX ADMIN — VANCOMYCIN HYDROCHLORIDE 1500 MG: 10 INJECTION, POWDER, LYOPHILIZED, FOR SOLUTION INTRAVENOUS at 06:11

## 2017-08-23 RX ADMIN — METRONIDAZOLE 500 MG: 250 TABLET, FILM COATED ORAL at 09:33

## 2017-08-23 RX ADMIN — VANCOMYCIN HYDROCHLORIDE 1500 MG: 10 INJECTION, POWDER, LYOPHILIZED, FOR SOLUTION INTRAVENOUS at 20:42

## 2017-08-23 RX ADMIN — VANCOMYCIN HYDROCHLORIDE 1500 MG: 10 INJECTION, POWDER, LYOPHILIZED, FOR SOLUTION INTRAVENOUS at 14:10

## 2017-08-23 RX ADMIN — OXYCODONE HYDROCHLORIDE AND ACETAMINOPHEN 1 TABLET: 5; 325 TABLET ORAL at 02:08

## 2017-08-23 RX ADMIN — Medication 10 ML: at 14:10

## 2017-08-23 RX ADMIN — OXYCODONE HYDROCHLORIDE AND ACETAMINOPHEN 1 TABLET: 5; 325 TABLET ORAL at 16:45

## 2017-08-23 RX ADMIN — OXYCODONE HYDROCHLORIDE AND ACETAMINOPHEN 1 TABLET: 5; 325 TABLET ORAL at 06:39

## 2017-08-23 NOTE — CONSULTS
Full consult dictated    B/l knee septic arthritis  Recent Crohn's disease exacerbation  Immunocompromised host    Started with some inflammation while had acute Crohn's attack, now pus on synovial aspiration. Synovial Cx NGSF, may be due to Abx OR unusual bacteria, slow growing  Need to cover GPC, skin kary, continue vancomycin  Need GNR coverage, change Cipro to ceftriaxone  Continue Flagyl for anaerobic coverage    I d/w patient need long 4 weeks of IV Abx course and need PIC line, she would like , GI to ok that  D/w patient s/e, complication of IV Abx and PIC line, she understands and agrees. Agree with , cannot have biologics until aggressive IV Abx complete and infection resolved    Thanks!   Will follow

## 2017-08-23 NOTE — ADVANCED PRACTICE NURSE
Bedside and Verbal shift change report given to Aide Manuel RN (oncoming nurse) by LAMAR Michelle (offgoing nurse). Report included the following information SBAR, Kardex, Intake/Output, MAR and Recent Results.

## 2017-08-23 NOTE — PROGRESS NOTES
Bedside and Verbal shift change report given to Baljinder Riggs RN (oncoming nurse) by Gt Perea RN (offgoing nurse). Report included the following information SBAR, Kardex, Procedure Summary, Intake/Output, MAR and Recent Results.

## 2017-08-23 NOTE — PROGRESS NOTES
Orthopaedic Progress Note  Post Op day: 3 Days Post-Op    August 23, 2017 10:26 AM     Patient: Oskar Pedraza MRN: [id number]  SSN: [id number]    YOB: 1965  Age: 50 y.o. Sex: female      Admit date:  8/19/2017  Date of Surgery:  8/20/2017   Procedures:  Procedure(s):  KNEE ARTHROSCOPY INCISION AND DRAINAGE OF LEFT AND RIGHT KNEE  Admitting Physician:  Hoa Wu MD   Surgeon:  Ashley Aguilera) and Role:     * Hoa Wu MD - Primary    Consulting Physician(s): Treatment Team: Attending Provider: Hoa Wu MD; Nurse Practitioner: Pam Pittman NP; Consulting Provider: Gurjit Martinez NP; Consulting Provider: Hoa Wu MD; Utilization Review: Ke Mahmood RN; Consulting Provider: Joao Da Silva MD; Utilization Review: Isidro Perez RN; Care Manager: Silvia Moctezuma    SUBJECTIVE:     Oskar Pedraza is a 50 y.o. female is 3 Days Post-Op s/p Procedure(s):  KNEE ARTHROSCOPY INCISION AND DRAINAGE OF LEFT AND RIGHT KNEE with an appropriate level of post-operative pain. No complaints of nausea, vomiting, dizziness, lightheadedness, chest pain, or shortness of breath. OBJECTIVE:       Physical Exam:  General: Alert, cooperative, no distress. Respiratory: Respirations unlabored  Neurological:  Neurovascular exam within normal limits. Motor: + DF/PF. Musculoskeletal: Calves soft, supple, non-tender upon palpation. Left and right knee with effusions. Pain with active and passive rom of bilateral knees. There is no erythema about either knee. No pain with A and P ROM of bilateral hips. Dressing/Wound:  Clean, dry and intact. No significant erythema or swelling.       Vital Signs:      Patient Vitals for the past 8 hrs:   BP Temp Pulse Resp SpO2   08/23/17 0740 128/81 98.5 °F (36.9 °C) 83 18 97 %   08/23/17 0652 126/75 98.5 °F (36.9 °C) 78 18 98 %   08/23/17 0549 133/83 98 °F (36.7 °C) 88 18 98 %   08/23/17 0459 111/69 98.3 °F (36.8 °C) 81 16 100 % 17 0354 114/63 98.5 °F (36.9 °C) 81 18 -   17 0254 110/65 98.2 °F (36.8 °C) 84 20 -                                          Temp (24hrs), Av.2 °F (36.8 °C), Min:97.8 °F (36.6 °C), Max:99.2 °F (37.3 °C)      Labs:        Recent Labs      17   0816   HCT  24.5*   HGB  8.0*     Lab Results   Component Value Date/Time    Sodium 141 2017 08:16 AM    Potassium 3.5 2017 08:16 AM    Chloride 108 2017 08:16 AM    CO2 29 2017 08:16 AM    Glucose 110 2017 08:16 AM    BUN 6 2017 08:16 AM    Creatinine 0.75 2017 08:16 AM    Calcium 8.3 2017 08:16 AM       PT/OT:        Gait  Base of Support: Widened  Speed/Astrid: Slow  Step Length: Left shortened, Right shortened  Gait Abnormalities: Antalgic  Ambulation - Level of Assistance: Minimal assistance  Distance (ft): 20 Feet (ft)  Assistive Device: Gait belt, Walker, rolling       Patient mobility  Bed Mobility Training  Rolling: Minimum assistance  Supine to Sit: Minimum assistance  Sit to Supine: Modified independent  Transfer Training  Sit to Stand: Total assistance (unable to tolerate at this time)  Stand to Sit: Minimum assistance, Additional time  Bed to Chair: Total assistance      Gait Training  Assistive Device: Gait belt, Walker, rolling  Ambulation - Level of Assistance: Minimal assistance  Distance (ft): 20 Feet (ft)   Weight Bearing Status  Right Side Weight Bearing: As tolerated  Left Side Weight Bearing: As tolerated        ASSESSMENT / PLAN:   Principal Problem:    Knee pain, left (2017)    Active Problems:    Essential hypertension (2017)      Crohn's disease of both small and large intestine without complication ([address] (5328)      Anemia (2017)      Debilitated patient (2017)      Obese (2017)      UTI (urinary tract infection) (2017)      Septic arthritis of knee, left ([address] (2017)       A: Bilateral knee septic arthritis s/p arthroscopic I and D    P: 1. No growth on cultures to date- consulted Dr. Opal Bledsoe with ID.      2. Application of ice to knees as needed       3. IV and PO analgesics for pain      4. Continue current IV abx regimen- will adjust once ID makes recommendations. 5. Crohn's disease- per GI         Signed By:  Donya Esposito, [address] [place] [address]  Pgr.  [phone]

## 2017-08-23 NOTE — PROGRESS NOTES
Problem: Self Care Deficits Care Plan (Adult)  Goal: *Acute Goals and Plan of Care (Insert Text)  Occupational Therapy Goals  Initiated 8/21/2017  1. Patient will perform grooming and sponge bathing standing at the sink with supervision/set-up within 7 day(s). 2. Patient will perform lower body dressing with supervision/set-up within 7 day(s). 3. Patient will perform toilet transfer with supervision/set-up and best adaptive equipment within 7 day(s). 4. Patient will perform all aspects of toileting with modified independence within 7 day(s). 5. Patient will participate in upper extremity therapeutic exercise/activities with independence for 10 minutes within 7 day(s). 6. Patient will stand for functional activity > or = 5 minutes with < or = 5/10 pain per patient report within 7 day(7). OCCUPATIONAL THERAPY TREATMENT  Patient: Ellen Morocho (28 y.o. female)  Date: 8/23/2017  Diagnosis: Knee pain, left  LEFT KNEE INFECTION  Septic arthritis of knee, left (HCC) Knee pain, left  Procedure(s) (LRB):  KNEE ARTHROSCOPY INCISION AND DRAINAGE OF LEFT AND RIGHT KNEE (Left) 3 Days Post-Op  Precautions: Fall, WBAT  Chart, occupational therapy assessment, plan of care, and goals were reviewed. ASSESSMENT:  Pt needing to use commode. She stood with moderate assist x 2 and transferred to drop arm [facility]. She needed verbal cueing as to hygiene and min assist for clothing management. Pt ambulated to sink in the room and washed her hands and brushed her hair standing for approximately 3 minutes before returning to sit in chair. Pt progressing. Recommend home health. Progression toward goals:  [X]          Improving appropriately and progressing toward goals  [ ]          Improving slowly and progressing toward goals  [ ]          Not making progress toward goals and plan of care will be adjusted       PLAN:  Patient continues to benefit from skilled intervention to address the above impairments.   Continue treatment per established plan of care. Discharge Recommendations:  Home health  Further Equipment Recommendations for Discharge:  TBD       SUBJECTIVE:   Patient stated I have not washed my hands in 4 days.       OBJECTIVE DATA SUMMARY:   Cognitive/Behavioral Status:  Neurologic State: Alert; Appropriate for age  Orientation Level: Oriented X4  Cognition: Appropriate for age attention/concentration           Functional Mobility and Transfers for ADLs:              Bed Mobility:     Supine to Sit: Moderate assistance  Sit to Supine: Minimum assistance                   Transfers:  Sit to Stand: Moderate assistance;Assist x2  Functional Transfers  Toilet Transfer : Moderate assistance;Assist x2  Cues: Physical assistance;Verbal cues provided  Adaptive Equipment: Bedside commode     Balance:  Sitting: Intact  Standing: Intact; With support  ADL Intervention:        Toileting  Bowel Hygiene: Minimum assistance  Cues: Verbal cues provided Pt transferred to drop arm bedside commode. Pain:  Pain Scale 1: Numeric (0 - 10)  Pain Intensity 1: 3  Pain Location 1: Back;Hip;Neck;Knee (Notified Olivia of pain. Patient request warm compress)  Pain Orientation 1: Right;Left  Pain Description 1: Aching  Pain Intervention(s) 1: Medication (see MAR)     Activity Tolerance:    Fair  Please refer to the flowsheet for vital signs taken during this treatment.   After treatment:   [X]  Patient left in no apparent distress sitting up in chair  [ ]  Patient left in no apparent distress in bed  [X]  Call bell left within reach  [ ]  Nursing notified  [ ]  Caregiver present  [ ]  Bed alarm activated      COMMUNICATION/COLLABORATION:   The patients plan of care was discussed with: Occupational Therapist and Rehabilitation Attendant     ERIN Kunz  Time Calculation: 15 mins

## 2017-08-23 NOTE — PROGRESS NOTES
Vancomcyin trough resulted at 17.9 mcg/ml. Drawn timely, this is a therapeutic result. Continue same regimen.

## 2017-08-23 NOTE — PROGRESS NOTES
8/23/2017 12:27 PM No plan for discharge today, [facility] notified. Awaiting final discharge iv abx orders. CM will follow.  JEOVANY Goodwin

## 2017-08-23 NOTE — PROGRESS NOTES
Gastrointestinal Progress Note    8/23/2017    Admit Date: 8/19/2017    Subjective:     New Complaints Today: patient states fatigued, and believes she is having blood with bowel movements since yesterday. Pain: LLQ abdominal pain remains present 3/10 intensity at rest. Nausea but no vomiting. Bowel Movements: 8-10 loose BM overnight and reports blood in stool per nursing    Patient received 2 units PRBCs overnight, repeat Hgb pending     Repeated discussion medications useable Crohn's disease; in past had used 6mp plus mesalamine without adverse side effect    Current Facility-Administered Medications   Medication Dose Route Frequency    vancomycin (VANCOCIN) 1500 mg in  ml infusion  1,500 mg IntraVENous Q8H    0.9% sodium chloride infusion 250 mL  250 mL IntraVENous PRN    ciprofloxacin HCl (CIPRO) tablet 500 mg  500 mg Oral BID    losartan (COZAAR) tablet 25 mg  25 mg Oral DAILY    metroNIDAZOLE (FLAGYL) tablet 500 mg  500 mg Oral TID    acetaminophen (TYLENOL) tablet 650 mg  650 mg Oral Q4H PRN    diphenhydrAMINE (BENADRYL) injection 12.5 mg  12.5 mg IntraVENous Q4H PRN    morphine injection 2 mg  2 mg IntraVENous Q4H PRN    ondansetron (ZOFRAN) injection 4 mg  4 mg IntraVENous Q4H PRN    sodium chloride (NS) flush 5-10 mL  5-10 mL IntraVENous Q8H    sodium chloride (NS) flush 5-10 mL  5-10 mL IntraVENous Q8H    sodium chloride (NS) flush 5-10 mL  5-10 mL IntraVENous PRN    oxyCODONE-acetaminophen (PERCOCET) 5-325 mg per tablet 1 Tab  1 Tab Oral Q4H PRN    naloxone (NARCAN) injection 0.4 mg  0.4 mg IntraVENous PRN    diphenhydrAMINE (BENADRYL) capsule 25 mg  25 mg Oral Q4H PRN    ondansetron (ZOFRAN ODT) tablet 4 mg  4 mg Oral Q4H PRN    docusate sodium (COLACE) capsule 100 mg  100 mg Oral BID    naproxen (NAPROSYN) tablet 500 mg  500 mg Oral BID WITH MEALS        Objective:     Blood pressure 128/81, pulse 83, temperature 98.5 °F (36.9 °C), resp.  rate 18, height 5' 6\" (1.676 m), weight 210 lb (95.3 kg), last menstrual period 07/12/2017, SpO2 97 %. 08/21 1901 - 08/23 0700  In: 1399.6 [I.V.:500]  Out: 652 [Urine:650]    EXAM:  GENERAL: alert, cooperative, no distress, pale HEART: regular rate and rhythm, S1, S2 normal, no murmur, click, rub or gallop, LUNGS: chest clear, no wheezing, rales, normal symmetric air entry, ABDOMEN: bowel sounds present, soft, nondistended, tenderness in particular across lower abdomen (worse in LLQ). No peritoneal signs. EXTREMITY: clean dressing on both knees, no lower extremity edema, sensation intact    Data Review    Recent Results (from the past 24 hour(s))   VANCOMYCIN, TROUGH    Collection Time: 08/22/17  9:03 PM   Result Value Ref Range    Vancomycin,trough 17.9 (H) 5.0 - 10.0 ug/mL    Reported dose date: NOT PROVIDED      Reported dose time: NOT PROVIDED      Reported dose: NOT PROVIDED UNITS   CBC W/O DIFF    Collection Time: 08/23/17  8:16 AM   Result Value Ref Range    WBC 6.8 3.6 - 11.0 K/uL    RBC 2.64 (L) 3.80 - 5.20 M/uL    HGB 8.0 (L) 11.5 - 16.0 g/dL    HCT 24.5 (L) 35.0 - 47.0 %    MCV 92.8 80.0 - 99.0 FL    MCH 30.3 26.0 - 34.0 PG    MCHC 32.7 30.0 - 36.5 g/dL    RDW 15.1 (H) 11.5 - 14.5 %    PLATELET 231 740 - 735 K/uL     CT Abd/Pelvis 8/22: Reviewed CT results, concerned there is continued component of inflammation in sigmoid colon region    Assessment:     Principal Problem:    Knee pain, left (8/19/2017)    Active Problems:    Essential hypertension (1/4/2017)      Crohn's disease of both small and large intestine without complication ([address] (1/0/4749)      Anemia (8/19/2017)      Debilitated patient (8/19/2017)      Obese (8/19/2017)      UTI (urinary tract infection) (8/19/2017)      Septic arthritis of knee, left ([address] (8/21/2017)        Plan:     1. Post transfusion Hgb 8.0, patient still actively bleeding, transfusion threshold <7.0   2. Continue with Metronidazole and Ciporfloxacin  3.  Starting Azathioprine 100 mg daily and Mesalamine 800 mg TID    Joss De MD  08/23/17  10:48 AM    Discussed with Attending, Dr. Tru Gavin, attestation to follow.     I have interviewed and examined patient with addendum to note above and formulation care plan    Ramez Hook M.D.

## 2017-08-23 NOTE — PROGRESS NOTES
Problem: Mobility Impaired (Adult and Pediatric)  Goal: *Acute Goals and Plan of Care (Insert Text)  Physical Therapy Goals  Initiated 8/21/2017  1. Patient will move from supine to sit and sit to supine in bed with independence within 7 day(s). 2. Patient will transfer from bed to chair and chair to bed with modified independence using the least restrictive device within 7 day(s). 3. Patient will perform sit to stand with modified independence within 7 day(s). 4. Patient will ambulate with modified independence for 150 feet with the least restrictive device within 7 day(s). 5. Patient will ascend/descend 4 stairs with 1 handrail(s) with modified independence within 7 day(s). 6. Patient will be independent with HEP for knees within 7 days. PHYSICAL THERAPY TREATMENT  Patient: Cookie Richardson (83 y.o. female)  Date: 8/23/2017  Diagnosis: Knee pain, left  LEFT KNEE INFECTION  Septic arthritis of knee, left (HCC) Knee pain, left  Procedure(s) (LRB):  KNEE ARTHROSCOPY INCISION AND DRAINAGE OF LEFT AND RIGHT KNEE (Left) 3 Days Post-Op  Precautions: Fall, WBAT      ASSESSMENT:  Pt received in bed, tearful but agreeable to participate with physical therapy. Pt reports neck and back stiffness along with B knee pain, R foot/ ankle pain. Mod A for bed mobility> sitting EOB. Decreased tolerance for knee flexion L>R. Four attempts to achieve sit>stand using RW for steadying. Encouragement for WB on L in standing. Able to performed standing toe raises using RW. Gait training x 10',around end of bed, using RW with CGA. Verbal cues for sequencing Decreased knee flexion with gait. Pt declined sitting in chair and returned to bed, min A sit>supine. Reviewed HEP. Ice packs applied. Dicussed with RN  transfers to [facility] to encourage improved functional mobility.   Progression toward goals:  [ ]    Improving appropriately and progressing toward goals  [X]    Improving slowly and progressing toward goals  [ ]    Not making progress toward goals and plan of care will be adjusted       PLAN:  Patient continues to benefit from skilled intervention to address the above impairments. Continue treatment per established plan of care. Discharge Recommendations:  Home Health and Outpatient  Further Equipment Recommendations for Discharge:  RW       SUBJECTIVE:   Patient stated I am so frustrated and scared and in pain.       OBJECTIVE DATA SUMMARY:   Critical Behavior:  Neurologic State: Alert, Appropriate for age  Orientation Level: Oriented X4  Cognition: Appropriate for age attention/concentration, Recognition of people/places  Safety/Judgement: Awareness of environment, Fall prevention, Home safety, Insight into deficits  Functional Mobility Training:  Bed Mobility:     Supine to Sit: Moderate assistance  Sit to Supine: Minimum assistance           Transfers:  Sit to Stand: Maximum assistance  Stand to Sit: Minimum assistance                             Balance:  Sitting: Intact  Standing: Intact; With support  Ambulation/Gait Training:  Distance (ft): 10 Feet (ft)  Assistive Device: Walker, rolling;Gait belt  Ambulation - Level of Assistance: Contact guard assistance        Gait Abnormalities: Antalgic;Decreased step clearance; Step to gait        Base of Support: Widened     Speed/Astrid: Slow                                  Stairs:                       Neuro Re-Education:     Therapeutic Exercises:   QS, SLR, heel slides  Pain:  Pain Scale 1: Numeric (0 - 10)  Pain Intensity 1: 6 (Pain is 6 in knees & hip. Pain level 4 in back & neck)  Pain Location 1: Back;Hip;Neck;Knee (Notified Olivia of pain. Patient request warm compress)  Pain Orientation 1: Right;Left  Pain Description 1: Aching  Pain Intervention(s) 1: Medication (see MAR)  Activity Tolerance:   Good  Please refer to the flowsheet for vital signs taken during this treatment.   After treatment:   [X]    Patient left in no apparent distress sitting up in chair  [ ]    Patient left in no apparent distress in bed  [X]    Call bell left within reach  [X]    Nursing notified  [ ]    Caregiver present  [X]    Bed alarm activated      COMMUNICATION/COLLABORATION:   The patients plan of care was discussed with: Registered Nurse     Danielle Connor   Time Calculation: 26 mins

## 2017-08-23 NOTE — PROGRESS NOTES
[address] [place] [address]  [address] [facility], [address] [place] [address]  [phone]      Medical Progress Note      NAME: Jorge Luis Florence   :  1965  MRM:  [id number]    Date/Time: 2017  9:49 AM        Subjective:     Chief Complaint:  Pain: bilateral knees, moderate, constant, better with pain meds. ROS:  (bold if positive, if negative)                        Tolerating PT  Tolerating Diet          Objective:       Vitals:          Last 24hrs VS reviewed since prior progress note.  Most recent are:    Visit Vitals    /81 (BP 1 Location: Left arm, BP Patient Position: At rest)    Pulse 83    Temp 98.5 °F (36.9 °C)    Resp 18    Ht 5' 6\" (1.676 m)    Wt 95.3 kg (210 lb)    SpO2 97%    BMI 33.89 kg/m2     SpO2 Readings from Last 6 Encounters:   17 97%   17 93%   17 98%   17 98%   17 96%    O2 Flow Rate (L/min): 2 l/min       Intake/Output Summary (Last 24 hours) at 17 0949  Last data filed at 17 0549   Gross per 24 hour   Intake           1399.6 ml   Output                2 ml   Net           1397.6 ml          Exam:     Physical Exam:    Gen:  Well-developed, obese, in no acute distress  HEENT:  Pink conjunctivae, PERRL, hearing intact to voice, moist mucous membranes  Neck:  Supple, without masses, thyroid non-tender  Resp:  No accessory muscle use, clear breath sounds without wheezes rales or rhonchi  Card:  No murmurs, normal S1, S2 without thrills, bruits or peripheral edema  Abd:  Soft, non-tender, non-distended, normoactive bowel sounds are present, no palpable organomegaly and no detectable hernias  Lymph:  No cervical or inguinal adenopathy  Musc:  No cyanosis or clubbing  Skin:  No rashes or ulcers, skin turgor is good, bilateral legs wrapped with ACE  Neuro:  Cranial nerves are grossly intact, no focal motor weakness, follows commands appropriately  Psych:  Good insight, oriented to person, place and time, alert Medications Reviewed: (see below)    Lab Data Reviewed: (see below)    ______________________________________________________________________    Medications:     Current Facility-Administered Medications   Medication Dose Route Frequency    vancomycin (VANCOCIN) 1500 mg in  ml infusion  1,500 mg IntraVENous Q8H    azaTHIOprine (IMURAN) tablet 100 mg  100 mg Oral DAILY AFTER BREAKFAST    mesalamine (DELZICOL) DR capsule 800 mg  800 mg Oral TID    0.9% sodium chloride infusion 250 mL  250 mL IntraVENous PRN    ciprofloxacin HCl (CIPRO) tablet 500 mg  500 mg Oral BID    losartan (COZAAR) tablet 25 mg  25 mg Oral DAILY    metroNIDAZOLE (FLAGYL) tablet 500 mg  500 mg Oral TID    acetaminophen (TYLENOL) tablet 650 mg  650 mg Oral Q4H PRN    diphenhydrAMINE (BENADRYL) injection 12.5 mg  12.5 mg IntraVENous Q4H PRN    morphine injection 2 mg  2 mg IntraVENous Q4H PRN    ondansetron (ZOFRAN) injection 4 mg  4 mg IntraVENous Q4H PRN    sodium chloride (NS) flush 5-10 mL  5-10 mL IntraVENous Q8H    sodium chloride (NS) flush 5-10 mL  5-10 mL IntraVENous Q8H    sodium chloride (NS) flush 5-10 mL  5-10 mL IntraVENous PRN    oxyCODONE-acetaminophen (PERCOCET) 5-325 mg per tablet 1 Tab  1 Tab Oral Q4H PRN    naloxone (NARCAN) injection 0.4 mg  0.4 mg IntraVENous PRN    diphenhydrAMINE (BENADRYL) capsule 25 mg  25 mg Oral Q4H PRN    ondansetron (ZOFRAN ODT) tablet 4 mg  4 mg Oral Q4H PRN    docusate sodium (COLACE) capsule 100 mg  100 mg Oral BID    naproxen (NAPROSYN) tablet 500 mg  500 mg Oral BID WITH MEALS            Lab Review:     Recent Labs      08/23/17   0816  08/22/17   0814  08/21/17   1758  08/21/17   0406   WBC  6.8  5.5   --   7.7   HGB  8.0*  6.9*  7.0*  6.8*   HCT  24.5*  21.1*   --   22.0*   PLT  287  323   --   320     Recent Labs      08/23/17   0816  08/22/17   0117  08/21/17   0209   NA  141   --   141   K  3.5   --   3.9   CL  108   --   109*   CO2  29   --   28   GLU  110*   -- 109*   BUN  6   --   7   CREA  0.75  0.78  0.79   CA  8.3*   --   8.1*     No results found for: GLUCPOC  No results for input(s): PH, PCO2, PO2, HCO3, FIO2 in the last 72 hours. No results for input(s): INR in the last 72 hours. No lab exists for component: INREXT, INREXT  No results found for: SDES  Lab Results   Component Value Date/Time    Culture result: No growth thus far, holding 14 days. 08/20/2017 09:15 AM    Culture result: No growth thus far, holding 14 days. 08/20/2017 09:15 AM    Culture result: No growth thus far, holding 14 days. 08/19/2017 12:30 PM    Culture result: Culture performed on Unspun Fluid 08/19/2017 12:30 PM    Culture result: No growth thus far, holding 14 days. 08/19/2017 12:30 PM            Assessment:     Principal Problem:    Knee pain, left (8/19/2017)    Active Problems:    Essential hypertension (1/4/2017)      Crohn's disease of both small and large intestine without complication ([address] (9/1/3210)      Anemia (8/19/2017)      Debilitated patient (8/19/2017)      Obese (8/19/2017)      UTI (urinary tract infection) (8/19/2017)      Septic arthritis of knee, left ([address] (8/21/2017)           Plan:      Active Problems:    Essential hypertension (1/4/2017)   - BP okay, follow on meds as above      Crohn's disease of both small and large intestine without complication ([address] (6/0/1687)   - GI has started her on immunosuppressive therapy   - follow Hgb      Anemia (8/19/2017)   - Hgb down yesterday with acute blood loss   - transfused and Hgb up appropriately   - iron studies with likely iron deficiency in the setting of chronic inflammation from IBD      Debilitated patient (8/19/2017)   - mobilize with PT/OT      Obese (8/19/2017)   - counseled on weight loss       UTI (urinary tract infection) (8/19/2017)   - Enterococcus on urine culture sens to vancomycin   - could switch to Zyvox for discharge      Knee pain, left (8/19/2017)   - management per Orthopedics, cultures negative so far      Total time spent with patient: 25 minutes                  Care Plan discussed with: Patient, Care Manager and Nursing Staff    Discussed:  Code Status, Care Plan and D/C Planning    Prophylaxis:  SCD's    Disposition:  HH PT, OT, RN           ___________________________________________________    Attending Physician: Mesfin Scherer MD

## 2017-08-24 LAB
ABO + RH BLD: NORMAL
ANION GAP SERPL CALC-SCNC: 7 MMOL/L (ref 5–15)
BLD PROD TYP BPU: NORMAL
BLD PROD TYP BPU: NORMAL
BLOOD GROUP ANTIBODIES SERPL: NORMAL
BPU ID: NORMAL
BPU ID: NORMAL
BUN SERPL-MCNC: 6 MG/DL (ref 6–20)
BUN/CREAT SERPL: 8 (ref 12–20)
CALCIUM SERPL-MCNC: 8.5 MG/DL (ref 8.5–10.1)
CHLORIDE SERPL-SCNC: 107 MMOL/L (ref 97–108)
CO2 SERPL-SCNC: 25 MMOL/L (ref 21–32)
CREAT SERPL-MCNC: 0.78 MG/DL (ref 0.55–1.02)
CROSSMATCH RESULT,%XM: NORMAL
CROSSMATCH RESULT,%XM: NORMAL
CRP SERPL HS-MCNC: >9.5 MG/L
ERYTHROCYTE [DISTWIDTH] IN BLOOD BY AUTOMATED COUNT: 15.2 % (ref 11.5–14.5)
ERYTHROCYTE [SEDIMENTATION RATE] IN BLOOD: 126 MM/HR (ref 0–30)
GLUCOSE SERPL-MCNC: 99 MG/DL (ref 65–100)
HCT VFR BLD AUTO: 26.3 % (ref 35–47)
HGB BLD-MCNC: 8.7 G/DL (ref 11.5–16)
MCH RBC QN AUTO: 30.1 PG (ref 26–34)
MCHC RBC AUTO-ENTMCNC: 33.1 G/DL (ref 30–36.5)
MCV RBC AUTO: 91 FL (ref 80–99)
PLATELET # BLD AUTO: 320 K/UL (ref 150–400)
POTASSIUM SERPL-SCNC: 3.4 MMOL/L (ref 3.5–5.1)
RBC # BLD AUTO: 2.89 M/UL (ref 3.8–5.2)
SODIUM SERPL-SCNC: 139 MMOL/L (ref 136–145)
SPECIMEN EXP DATE BLD: NORMAL
STATUS OF UNIT,%ST: NORMAL
STATUS OF UNIT,%ST: NORMAL
UNIT DIVISION, %UDIV: 0
UNIT DIVISION, %UDIV: 0
WBC # BLD AUTO: 7 K/UL (ref 3.6–11)

## 2017-08-24 PROCEDURE — 97116 GAIT TRAINING THERAPY: CPT

## 2017-08-24 PROCEDURE — 97535 SELF CARE MNGMENT TRAINING: CPT

## 2017-08-24 PROCEDURE — 74011250636 HC RX REV CODE- 250/636: Performed by: FAMILY MEDICINE

## 2017-08-24 PROCEDURE — 36415 COLL VENOUS BLD VENIPUNCTURE: CPT | Performed by: INTERNAL MEDICINE

## 2017-08-24 PROCEDURE — 86141 C-REACTIVE PROTEIN HS: CPT | Performed by: NURSE PRACTITIONER

## 2017-08-24 PROCEDURE — 74011250637 HC RX REV CODE- 250/637: Performed by: NURSE PRACTITIONER

## 2017-08-24 PROCEDURE — 74011250637 HC RX REV CODE- 250/637: Performed by: INTERNAL MEDICINE

## 2017-08-24 PROCEDURE — 74011250636 HC RX REV CODE- 250/636: Performed by: NURSE PRACTITIONER

## 2017-08-24 PROCEDURE — 85027 COMPLETE CBC AUTOMATED: CPT | Performed by: INTERNAL MEDICINE

## 2017-08-24 PROCEDURE — 74011250636 HC RX REV CODE- 250/636: Performed by: INTERNAL MEDICINE

## 2017-08-24 PROCEDURE — 74011000258 HC RX REV CODE- 258: Performed by: INTERNAL MEDICINE

## 2017-08-24 PROCEDURE — 97530 THERAPEUTIC ACTIVITIES: CPT

## 2017-08-24 PROCEDURE — 65270000029 HC RM PRIVATE

## 2017-08-24 PROCEDURE — 85652 RBC SED RATE AUTOMATED: CPT | Performed by: NURSE PRACTITIONER

## 2017-08-24 PROCEDURE — 74011250637 HC RX REV CODE- 250/637: Performed by: FAMILY MEDICINE

## 2017-08-24 PROCEDURE — 80048 BASIC METABOLIC PNL TOTAL CA: CPT | Performed by: INTERNAL MEDICINE

## 2017-08-24 RX ORDER — POTASSIUM CHLORIDE 750 MG/1
40 TABLET, FILM COATED, EXTENDED RELEASE ORAL EVERY 4 HOURS
Status: COMPLETED | OUTPATIENT
Start: 2017-08-24 | End: 2017-08-24

## 2017-08-24 RX ADMIN — NAPROXEN 500 MG: 250 TABLET ORAL at 08:16

## 2017-08-24 RX ADMIN — Medication 10 ML: at 05:33

## 2017-08-24 RX ADMIN — Medication 10 ML: at 21:12

## 2017-08-24 RX ADMIN — NAPROXEN 500 MG: 250 TABLET ORAL at 16:44

## 2017-08-24 RX ADMIN — METRONIDAZOLE 500 MG: 250 TABLET, FILM COATED ORAL at 21:11

## 2017-08-24 RX ADMIN — OXYCODONE HYDROCHLORIDE AND ACETAMINOPHEN 1 TABLET: 5; 325 TABLET ORAL at 01:29

## 2017-08-24 RX ADMIN — OXYCODONE HYDROCHLORIDE AND ACETAMINOPHEN 1 TABLET: 5; 325 TABLET ORAL at 05:36

## 2017-08-24 RX ADMIN — POTASSIUM CHLORIDE 40 MEQ: 750 TABLET, FILM COATED, EXTENDED RELEASE ORAL at 14:35

## 2017-08-24 RX ADMIN — METRONIDAZOLE 500 MG: 250 TABLET, FILM COATED ORAL at 16:44

## 2017-08-24 RX ADMIN — MESALAMINE 800 MG: 400 CAPSULE, DELAYED RELEASE ORAL at 16:44

## 2017-08-24 RX ADMIN — METRONIDAZOLE 500 MG: 250 TABLET, FILM COATED ORAL at 08:16

## 2017-08-24 RX ADMIN — VANCOMYCIN HYDROCHLORIDE 1500 MG: 10 INJECTION, POWDER, LYOPHILIZED, FOR SOLUTION INTRAVENOUS at 14:35

## 2017-08-24 RX ADMIN — POTASSIUM CHLORIDE 40 MEQ: 750 TABLET, FILM COATED, EXTENDED RELEASE ORAL at 10:46

## 2017-08-24 RX ADMIN — VANCOMYCIN HYDROCHLORIDE 1500 MG: 10 INJECTION, POWDER, LYOPHILIZED, FOR SOLUTION INTRAVENOUS at 05:34

## 2017-08-24 RX ADMIN — Medication 10 ML: at 16:45

## 2017-08-24 RX ADMIN — MESALAMINE 800 MG: 400 CAPSULE, DELAYED RELEASE ORAL at 21:11

## 2017-08-24 RX ADMIN — MESALAMINE 800 MG: 400 CAPSULE, DELAYED RELEASE ORAL at 08:16

## 2017-08-24 RX ADMIN — VANCOMYCIN HYDROCHLORIDE 1500 MG: 10 INJECTION, POWDER, LYOPHILIZED, FOR SOLUTION INTRAVENOUS at 21:11

## 2017-08-24 RX ADMIN — OXYCODONE HYDROCHLORIDE AND ACETAMINOPHEN 1 TABLET: 5; 325 TABLET ORAL at 09:30

## 2017-08-24 RX ADMIN — AZATHIOPRINE 100 MG: 50 TABLET ORAL at 08:16

## 2017-08-24 RX ADMIN — LOSARTAN POTASSIUM 25 MG: 50 TABLET, FILM COATED ORAL at 08:16

## 2017-08-24 RX ADMIN — OXYCODONE HYDROCHLORIDE AND ACETAMINOPHEN 1 TABLET: 5; 325 TABLET ORAL at 18:45

## 2017-08-24 RX ADMIN — Medication 10 ML: at 21:11

## 2017-08-24 RX ADMIN — OXYCODONE HYDROCHLORIDE AND ACETAMINOPHEN 1 TABLET: 5; 325 TABLET ORAL at 14:35

## 2017-08-24 RX ADMIN — OXYCODONE HYDROCHLORIDE AND ACETAMINOPHEN 1 TABLET: 5; 325 TABLET ORAL at 23:23

## 2017-08-24 RX ADMIN — CEFTRIAXONE 2 G: 2 INJECTION, POWDER, FOR SOLUTION INTRAMUSCULAR; INTRAVENOUS at 19:48

## 2017-08-24 NOTE — PROGRESS NOTES
Problem: Mobility Impaired (Adult and Pediatric)  Goal: *Acute Goals and Plan of Care (Insert Text)  Physical Therapy Goals  Initiated 8/21/2017  1. Patient will move from supine to sit and sit to supine in bed with independence within 7 day(s). 2. Patient will transfer from bed to chair and chair to bed with modified independence using the least restrictive device within 7 day(s). 3. Patient will perform sit to stand with modified independence within 7 day(s). 4. Patient will ambulate with modified independence for 150 feet with the least restrictive device within 7 day(s). 5. Patient will ascend/descend 4 stairs with 1 handrail(s) with modified independence within 7 day(s). 6. Patient will be independent with HEP for knees within 7 days. PHYSICAL THERAPY TREATMENT  Patient: Oskar Pedraza (80 y.o. female)  Date: 8/24/2017  Diagnosis: Knee pain, left  LEFT KNEE INFECTION  Septic arthritis of knee, left (HCC) Knee pain, left  Procedure(s) (LRB):  KNEE ARTHROSCOPY INCISION AND DRAINAGE OF LEFT AND RIGHT KNEE (Left) 4 Days Post-Op  Precautions: Fall, WBAT      ASSESSMENT:  Pt supine in bed with ice packs applied to bilateral knees. Reports pain 5/10 overall with pain specifically in knees, back and neck this day. Agreeable to therapy to walk into bathroom. Pt very tearful today with any movement. Utilized log roll to reach EOB to decrease back pain. Mod A to come to sitting. Elevated bed to stand with minimal knee flexion. Ambulated to bathroom CGA, very slow and crying throughout. Max A to stand from elevated toilet seat and multiple attempts. Pt unable to flex knees an adequate amount for ease of transfer. Use of [facility] armrest, grab bars and PT to come to stand. Max A for bowel hygiene. Returned to bed with Min A to achieve supine. Pt still tearful and expressed concerns that she was working 2 weeks ago and now she can't do anything. RN in room to administer medications. Unsure of disposition plan at this time. Feel pt will be able to go home when pain is under control with outpatient services. However may possibly need rehab if mobility does not improve. Progression toward goals:  [ ]    Improving appropriately and progressing toward goals  [X]    Improving slowly and progressing toward goals  [ ]    Not making progress toward goals and plan of care will be adjusted       PLAN:  Patient continues to benefit from skilled intervention to address the above impairments. Continue treatment per established plan of care. Discharge Recommendations:  Rehab vs Outpatient  Further Equipment Recommendations for Discharge:  TBD       SUBJECTIVE:   Patient stated I worked 2 weeks ago and it just happened so fast.      OBJECTIVE DATA SUMMARY:   Critical Behavior:  Neurologic State: Alert  Orientation Level: Oriented X4  Cognition: Appropriate decision making, Appropriate for age attention/concentration, Appropriate safety awareness, Follows commands  Safety/Judgement: Awareness of environment, Fall prevention, Home safety, Insight into deficits  Functional Mobility Training:  Bed Mobility:  Rolling: Moderate assistance  Supine to Sit: Moderate assistance  Sit to Supine: Minimum assistance           Transfers:  Sit to Stand: Moderate assistance (bed elevated to slide off into standing)  Stand to Sit: Moderate assistance                             Balance:     Ambulation/Gait Training:  Distance (ft): 30 Feet (ft)  Assistive Device: Walker, rolling;Gait belt  Ambulation - Level of Assistance: Contact guard assistance        Gait Abnormalities: Antalgic;Decreased step clearance; Step to gait        Base of Support: Widened     Speed/Astrid: Slow  Step Length: Right shortened;Left shortened           Activity Tolerance:   Poor  Please refer to the flowsheet for vital signs taken during this treatment.   After treatment:   [ ]    Patient left in no apparent distress sitting up in chair  [X]    Patient left in no apparent distress in bed  [X]    Call bell left within reach  [X]    Nursing notified  [ ]    Caregiver present  [ ]    Bed alarm activated      COMMUNICATION/COLLABORATION:   The patients plan of care was discussed with: Registered Nurse     Karlie Carpio, PT   Time Calculation: 25 mins

## 2017-08-24 NOTE — PROGRESS NOTES
Problem: Self Care Deficits Care Plan (Adult)  Goal: *Acute Goals and Plan of Care (Insert Text)  Occupational Therapy Goals  Initiated 8/21/2017  1. Patient will perform grooming and sponge bathing standing at the sink with supervision/set-up within 7 day(s). 2. Patient will perform lower body dressing with supervision/set-up within 7 day(s). 3. Patient will perform toilet transfer with supervision/set-up and best adaptive equipment within 7 day(s). 4. Patient will perform all aspects of toileting with modified independence within 7 day(s). 5. Patient will participate in upper extremity therapeutic exercise/activities with independence for 10 minutes within 7 day(s). 6. Patient will stand for functional activity > or = 5 minutes with < or = 5/10 pain per patient report within 7 day(7). OCCUPATIONAL THERAPY TREATMENT  Patient: Lisa Gayle (82 y.o. female)  Date: 8/24/2017  Diagnosis: Knee pain, left  LEFT KNEE INFECTION  Septic arthritis of knee, left (HCC) Knee pain, left  Procedure(s) (LRB):  KNEE ARTHROSCOPY INCISION AND DRAINAGE OF LEFT AND RIGHT KNEE (Left) 4 Days Post-Op  Precautions: Fall, WBAT      ASSESSMENT:  Cleared by RN to see pt for therapy session. Pt received supine in bed, needing to use restroom. Pt performed supine>sit with CGA, and demonstrated good sitting balance at EOB. With bed height raised, pt stood with min A to RW with increased time. Performed stand pivot transfer to [facility] (due to urgency) with mod A. Pt performed toilet hygiene in sitting, but ultimately required mod A for hygiene. From [facility] (lower surface than bed) pt stood with mod A after 2 attempts. Minimal cues required to have pt push up with one hand from sitting surface to walker. Pt reported she was feeling to fatigued to sit up in chair, so transferred to supine in bed with min A to manage LEs. Throughout session pt reported pain in knees, but that pain was decreased from this morning.  Pt reported she independent and working 2 weeks ago, and that she wishes to improve her functional performance. She will benefit from continued OT to increase independence with ADLs and transfers. At this time recommend rehab upon discharge as pt is performing well below her functional baseline. Progression toward goals:  [X]       Improving appropriately and progressing toward goals  [ ]       Improving slowly and progressing toward goals  [ ]       Not making progress toward goals and plan of care will be adjusted       PLAN:  Patient continues to benefit from skilled intervention to address the above impairments. Continue treatment per established plan of care. Discharge Recommendations:  Rehab  Further Equipment Recommendations for Discharge:  TBD at rehab       SUBJECTIVE:   Patient stated [employer] you believe I was working 2 weeks ago? Shama Gomez      OBJECTIVE DATA SUMMARY:   Cognitive/Behavioral Status:  Neurologic State: Alert  Orientation Level: Oriented X4  Cognition: Follows commands  Perception: Appears intact  Perseveration: No perseveration noted  Safety/Judgement: Awareness of environment; Fall prevention;Home safety     Functional Mobility and Transfers for ADLs:  Bed Mobility:  Rolling: Moderate assistance  Supine to Sit: Contact guard assistance  Sit to Supine: Minimum assistance     Transfers:  Sit to Stand: Minimum assistance; Moderate assistance  Functional Transfers  Toilet Transfer : Moderate assistance (to [facility] with RW)     Balance:  Sitting: Intact; Without support  Standing: Impaired; With support (RW)  Standing - Static: Good  Standing - Dynamic : Fair     ADL Intervention:   Pt performed toilet hygiene in sitting, but ultimately required mod A for thoroughness of hygiene in standing. Feeding  Feeding Assistance: Independent        Toileting  Bowel Hygiene: Moderate assistance     Cognitive Retraining  Safety/Judgement: Awareness of environment; Fall prevention;Home safety     Pain:  Pain Scale 1: Numeric (0 - 10)  Pain Intensity 1: 4  Pain Location 1: Knee;Neck  Pain Orientation 1: Right;Left  Pain Description 1: Aching  Pain Intervention(s) 1: Medication (see MAR)  Activity Tolerance:   Fair  Please refer to the flowsheet for vital signs taken during this treatment.   After treatment:   [ ] Patient left in no apparent distress sitting up in chair  [X] Patient left in no apparent distress in bed  [X] Call bell left within reach  [X] Nursing notified  [ ] Caregiver present  [ ] Bed alarm activated      COMMUNICATION/COLLABORATION:   The patients plan of care was discussed with: Physical Therapist and Registered Nurse     ERIK Waddell  Time Calculation: 30 mins

## 2017-08-24 NOTE — CONSULTS
Sravan Loredo hafsa [address] 81   201 [facility], operated by [facility], [address] [place], [place] [address]   [address] [place] [address]       Name:  David Shepherd   MR#:  [id number]   :  1965   Account #:  [de-identified]    Date of Consultation:  2017   Date of Adm:  2017       ATTENDING PHYSICIAN: Dr. Candido Edgar: 2017    REASON FOR CONSULTATION: Bilateral knee septic arthritis. HISTORY OF PRESENT ILLNESS: This is a 53-year-old female with   past medical history of Crohn's disease, hypertension, etc, came to the   emergency room with complaints of severe sudden onset of left knee   pain and swelling as well as persisting right knee pain and swelling. The patient was recently in the hospital for Crohn's disease flareup and   diverticulitis, was sent home on ciprofloxacin, Flagyl and high-dose   prednisone, and at that time she had a right knee sudden swelling,   pain that was aspirated, did not look infected, was given a steroid   injection shot and was sent home. The patient continued to take Cipro   and Flagyl as prescribed. Denies any fevers, chills, cough, sinus   rhythm difficulties, nausea, vomiting, abdominal pain, diarrhea, burning   urination or other complaints. REVIEW OF SYSTEMS   All positive pertinent findings are mentioned in HPI. All other systems   are reviewed in detail and mostly negative. PAST MEDICAL HISTORY:   1. Significant for Crohn's disease, colitis. 2. Hypertension. 3. Appendectomy. 4. Cholecystectomy. 5. [place] teeth extraction. 6. Tonsillectomy and adenoidectomy. MEDICATIONS: The patient has been getting Cipro and Flagyl and   vancomycin. Other medications are reviewed in detail in the chart. ALLERGIES:   1. SULFA CAUSES FLUSHED FACE. 2. CODEINE CAUSES NAUSEA AND VOMITING. SOCIAL HISTORY: The patient never smoked, drinks about 3 glasses   of wine over 1 week. FAMILY HISTORY: Noncontributory.     PHYSICAL EXAMINATION   GENERAL: A young woman, sitting comfortably in the bed, not in any   apparent distress. VITAL SIGNS: Temperature 97.6, heart rate 90, respiratory rate 18,   blood pressure 142/79. HEENT: Pallor. No icterus. Pupils are equally reactive to light. Extraocular muscle movement intact. LUNGS: Chest clear to auscultation bilaterally. HEART: Regular rate and rhythm, S1, S2.   ABDOMEN: Soft, nontender. Bowel sounds are present. EXTREMITIES: Bilateral knees, some edema and swelling and no   erythema. LABORATORY DATA: White count 6.8, hemoglobin 8, platelets   448,595. Urinalysis is mostly negative. Urine culture on the 19th is   growing more than 100,000 Enterococcus faecalis, only sensitive to   linezolid and vancomycin and resistant to ampicillin. Blood culture on   08/15/2017, last admission,synovial fluid cultures have stayed   negative. Repeat synovial fluid cultures are still negative. Last   admission synovial fluid white count 274. At this time, they were   50,436 in left and right knee, respectively. Neck CT abdomen and   pelvis on 22nd showed sigmoid colonic diverticulosis, inflammatory   change has resolved. No drainable fluid collection. ASSESSMENT AND PLAN: This is a 7-year-old female with:    1. Bilateral knee septic arthritis. 2. Recent Crohn's disease flare up and exacerbation. 3. Immunocompromised host on high-dose steroids. The patient started with some inflammation on the knee while she was   here with recent Crohn's flare-up and now pus was noticed on synovial   aspiration. Synovial cultures still stay negative, may be due to antibiotic   use as the patient was already on antibiotic or unusual bacteria, a slow   growing bacteria. We need to cover GPC skin kary. Continue   vancomycin need gram-negative jeanne coverage, happened while she   was on Cipro, so change to ceftriaxone. Continue Flagyl for anaerobic   coverage.  I discussed with the patient that she needs 4 weeks of IV   antibiotic course and need PICC line, but she would like Dr. Huyen Lyles in GI   to okay that. I have discussed with the patient side effect complication   of IV antibiotic and PICC line. She understands and agrees. I also   agree with Dr. Huyen Lyles that the patient cannot have biologics until we finish   the aggressive IV antibiotic and the infection is resolved. Thank you so much for the opportunity to participate in the patient's   care. We will continue to follow.         MD PIPPA Burnett / RICKIE   D:  08/23/2017   19:15   T:  08/23/2017   20:57   Job #:  [id number]

## 2017-08-24 NOTE — PROGRESS NOTES
Sravan Champagneelsen hafsa [place] [address]  566 [facility], [address] [place] [address]  [phone]      Medical Progress Note      NAME: Cookie Richardson   :  1965  MRM:  [id number]    Date/Time: 2017  9:49 AM        Subjective:     Chief Complaint:  Pain: bilateral knees, moderate, constant, better with pain meds. ROS:  (bold if positive, if negative)                        Tolerating PT  Tolerating Diet          Objective:       Vitals:          Last 24hrs VS reviewed since prior progress note.  Most recent are:    Visit Vitals    /82 (BP 1 Location: Left arm, BP Patient Position: At rest)    Pulse 88    Temp 97.9 °F (36.6 °C)    Resp 16    Ht 5' 6\" (1.676 m)    Wt 95.3 kg (210 lb)    SpO2 98%    Breastfeeding No    BMI 33.89 kg/m2     SpO2 Readings from Last 6 Encounters:   17 98%   17 93%   17 98%   17 98%   17 96%    O2 Flow Rate (L/min): 2 l/min       Intake/Output Summary (Last 24 hours) at 17 0925  Last data filed at 17 1410   Gross per 24 hour   Intake              500 ml   Output                0 ml   Net              500 ml          Exam:     Physical Exam:    Gen:  Well-developed, obese, in no acute distress  HEENT:  Pink conjunctivae, PERRL, hearing intact to voice, moist mucous membranes  Neck:  Supple, without masses, thyroid non-tender  Resp:  No accessory muscle use, clear breath sounds without wheezes rales or rhonchi  Card:  No murmurs, normal S1, S2 without thrills, bruits or peripheral edema  Abd:  Soft, non-tender, non-distended, normoactive bowel sounds are present, no palpable organomegaly and no detectable hernias  Lymph:  No cervical or inguinal adenopathy  Musc:  No cyanosis or clubbing  Skin:  No rashes or ulcers, skin turgor is good, bilateral legs wrapped with ACE  Neuro:  Cranial nerves are grossly intact, no focal motor weakness, follows commands appropriately  Psych:  Good insight, oriented to person, place and time, alert       Medications Reviewed: (see below)    Lab Data Reviewed: (see below)    ______________________________________________________________________    Medications:     Current Facility-Administered Medications   Medication Dose Route Frequency    potassium chloride SR (KLOR-CON 10) tablet 40 mEq  40 mEq Oral Q4H    vancomycin (VANCOCIN) 1500 mg in  ml infusion  1,500 mg IntraVENous Q8H    azaTHIOprine (IMURAN) tablet 100 mg  100 mg Oral DAILY AFTER BREAKFAST    mesalamine (DELZICOL) DR capsule 800 mg  800 mg Oral TID    cefTRIAXone (ROCEPHIN) 2 g in 0.9% sodium chloride (MBP/ADV) 50 mL  2 g IntraVENous Q24H    0.9% sodium chloride infusion 250 mL  250 mL IntraVENous PRN    losartan (COZAAR) tablet 25 mg  25 mg Oral DAILY    metroNIDAZOLE (FLAGYL) tablet 500 mg  500 mg Oral TID    acetaminophen (TYLENOL) tablet 650 mg  650 mg Oral Q4H PRN    diphenhydrAMINE (BENADRYL) injection 12.5 mg  12.5 mg IntraVENous Q4H PRN    morphine injection 2 mg  2 mg IntraVENous Q4H PRN    ondansetron (ZOFRAN) injection 4 mg  4 mg IntraVENous Q4H PRN    sodium chloride (NS) flush 5-10 mL  5-10 mL IntraVENous Q8H    sodium chloride (NS) flush 5-10 mL  5-10 mL IntraVENous Q8H    sodium chloride (NS) flush 5-10 mL  5-10 mL IntraVENous PRN    oxyCODONE-acetaminophen (PERCOCET) 5-325 mg per tablet 1 Tab  1 Tab Oral Q4H PRN    naloxone (NARCAN) injection 0.4 mg  0.4 mg IntraVENous PRN    diphenhydrAMINE (BENADRYL) capsule 25 mg  25 mg Oral Q4H PRN    ondansetron (ZOFRAN ODT) tablet 4 mg  4 mg Oral Q4H PRN    docusate sodium (COLACE) capsule 100 mg  100 mg Oral BID    naproxen (NAPROSYN) tablet 500 mg  500 mg Oral BID WITH MEALS            Lab Review:     Recent Labs      08/24/17   0126  08/23/17   0816  08/22/17   0814   WBC  7.0  6.8  5.5   HGB  8.7*  8.0*  6.9*   HCT  26.3*  24.5*  21.1*   PLT  320  287  323     Recent Labs      08/24/17   0126  08/23/17   0816  08/22/17   0117   NA  139  141   -- K  3.4*  3.5   --    CL  107  108   --    CO2  25  29   --    GLU  99  110*   --    BUN  6  6   --    CREA  0.78  0.75  0.78   CA  8.5  8.3*   --      No results found for: GLUCPOC  No results for input(s): PH, PCO2, PO2, HCO3, FIO2 in the last 72 hours. No results for input(s): INR in the last 72 hours. No lab exists for component: INREXT, INREXT  No results found for: SDES  Lab Results   Component Value Date/Time    Culture result: No growth thus far, holding 14 days. 08/20/2017 09:15 AM    Culture result: No growth thus far, holding 14 days. 08/20/2017 09:15 AM    Culture result: No growth thus far, holding 14 days. 08/19/2017 12:30 PM    Culture result: Culture performed on Unspun Fluid 08/19/2017 12:30 PM    Culture result: No growth thus far, holding 14 days. 08/19/2017 12:30 PM            Assessment:     Principal Problem:    Knee pain, left (8/19/2017)    Active Problems:    Essential hypertension (1/4/2017)      Crohn's disease of both small and large intestine without complication ([address] (1/1/5794)      Anemia (8/19/2017)      Debilitated patient (8/19/2017)      Obese (8/19/2017)      UTI (urinary tract infection) (8/19/2017)      Septic arthritis of knee, left ([address] (8/21/2017)           Plan:      Active Problems:    Essential hypertension (1/4/2017)   - BP okay, follow on meds as above      Crohn's disease of both small and large intestine without complication ([address] (9/3/5624)   - GI has started her on immunosuppressive therapy   - follow Hgb      Anemia (8/19/2017)   - Hgb down earlier this week with acute blood loss   - transfused and Hgb up appropriately   - iron studies with likely iron deficiency in the setting of chronic inflammation from IBD      Debilitated patient (8/19/2017)   - mobilize with PT/OT      Obese (8/19/2017)   - counseled on weight loss       UTI (urinary tract infection) (8/19/2017)   - Enterococcus on urine culture sens to vancomycin   - antibiotics at discharge per ID Knee pain, left (8/19/2017)   - management per Orthopedics, cultures negative so far, ID recommending extended course of IV antibiotics      Total time spent with patient: 25 minutes                  Care Plan discussed with: Patient, Care Manager and Nursing Staff    Discussed:  Code Status, Care Plan and D/C Planning    Prophylaxis:  SCD's    Disposition:   PT, OT, RN           ___________________________________________________    Attending Physician: Rey Dey MD

## 2017-08-24 NOTE — PROGRESS NOTES
Gastrointestinal Progress Note    8/24/2017    Admit Date: 8/19/2017    Subjective:     New Complaints Today: Still having frequent bowel movements. Not sure if any continued blood. Does have some intermittent lower abdominal pain (currently it has resolved). Tolerating diet. Denies nausea or vomiting.     Repeated discussion medications useable Crohn's disease; in past had used 6mp plus mesalamine without adverse side effect    Current Facility-Administered Medications   Medication Dose Route Frequency    potassium chloride SR (KLOR-CON 10) tablet 40 mEq  40 mEq Oral Q4H    vancomycin (VANCOCIN) 1500 mg in  ml infusion  1,500 mg IntraVENous Q8H    azaTHIOprine (IMURAN) tablet 100 mg  100 mg Oral DAILY AFTER BREAKFAST    mesalamine (DELZICOL) DR capsule 800 mg  800 mg Oral TID    cefTRIAXone (ROCEPHIN) 2 g in 0.9% sodium chloride (MBP/ADV) 50 mL  2 g IntraVENous Q24H    0.9% sodium chloride infusion 250 mL  250 mL IntraVENous PRN    losartan (COZAAR) tablet 25 mg  25 mg Oral DAILY    metroNIDAZOLE (FLAGYL) tablet 500 mg  500 mg Oral TID    acetaminophen (TYLENOL) tablet 650 mg  650 mg Oral Q4H PRN    diphenhydrAMINE (BENADRYL) injection 12.5 mg  12.5 mg IntraVENous Q4H PRN    morphine injection 2 mg  2 mg IntraVENous Q4H PRN    ondansetron (ZOFRAN) injection 4 mg  4 mg IntraVENous Q4H PRN    sodium chloride (NS) flush 5-10 mL  5-10 mL IntraVENous Q8H    sodium chloride (NS) flush 5-10 mL  5-10 mL IntraVENous Q8H    sodium chloride (NS) flush 5-10 mL  5-10 mL IntraVENous PRN    oxyCODONE-acetaminophen (PERCOCET) 5-325 mg per tablet 1 Tab  1 Tab Oral Q4H PRN    naloxone (NARCAN) injection 0.4 mg  0.4 mg IntraVENous PRN    diphenhydrAMINE (BENADRYL) capsule 25 mg  25 mg Oral Q4H PRN    ondansetron (ZOFRAN ODT) tablet 4 mg  4 mg Oral Q4H PRN    docusate sodium (COLACE) capsule 100 mg  100 mg Oral BID    naproxen (NAPROSYN) tablet 500 mg  500 mg Oral BID WITH MEALS        Objective:     Blood pressure 133/86, pulse 87, temperature 97.9 °F (36.6 °C), resp. rate 18, height 5' 6\" (1.676 m), weight 95.3 kg (210 lb), last menstrual period 07/12/2017, SpO2 97 %, not currently breastfeeding. 08/22 1901 - 08/24 0700  In: 1399.6 [I.V.:500]  Out: -     EXAM:  GENERAL: alert, cooperative, no distress, pale HEART: regular rate and rhythm, S1, S2 normal, no murmur, click, rub or gallop, LUNGS: chest clear, no wheezing, rales, normal symmetric air entry, ABDOMEN: bowel sounds present, soft, nondistended, mild tenderness lower abdomen (increased in LLQ). No peritoneal signs. EXTREMITY: clean dressing on both knees, no lower extremity edema, sensation intact. Limited bilateral knee flexion.     Data Review    Recent Results (from the past 24 hour(s))   CBC W/O DIFF    Collection Time: 08/24/17  1:26 AM   Result Value Ref Range    WBC 7.0 3.6 - 11.0 K/uL    RBC 2.89 (L) 3.80 - 5.20 M/uL    HGB 8.7 (L) 11.5 - 16.0 g/dL    HCT 26.3 (L) 35.0 - 47.0 %    MCV 91.0 80.0 - 99.0 FL    MCH 30.1 26.0 - 34.0 PG    MCHC 33.1 30.0 - 36.5 g/dL    RDW 15.2 (H) 11.5 - 14.5 %    PLATELET 919 853 - 105 K/uL   METABOLIC PANEL, BASIC    Collection Time: 08/24/17  1:26 AM   Result Value Ref Range    Sodium 139 136 - 145 mmol/L    Potassium 3.4 (L) 3.5 - 5.1 mmol/L    Chloride 107 97 - 108 mmol/L    CO2 25 21 - 32 mmol/L    Anion gap 7 5 - 15 mmol/L    Glucose 99 65 - 100 mg/dL    BUN 6 6 - 20 MG/DL    Creatinine 0.78 0.55 - 1.02 MG/DL    BUN/Creatinine ratio 8 (L) 12 - 20      GFR est AA >60 >60 ml/min/1.73m2    GFR est non-AA >60 >60 ml/min/1.73m2    Calcium 8.5 8.5 - 10.1 MG/DL     CT Abd/Pelvis 8/22: Reviewed CT results, concerned there is continued component of inflammation in sigmoid colon region    Assessment:     Principal Problem:    Knee pain, left (8/19/2017)    Active Problems:    Essential hypertension (1/4/2017)      Crohn's disease of both small and large intestine without complication ([facility] (8/4/2752)      Anemia (8/19/2017)      Debilitated patient (8/19/2017)      Obese (8/19/2017)      UTI (urinary tract infection) (8/19/2017)      Septic arthritis of knee, left ([address] (8/21/2017)        Plan:     1. Hgb 8.7 today and vital signs stable  2. Antibiotic regimen as per infectious disease  3. continue Azathioprine 100 mg daily and Mesalamine 800 mg TID  4.  Consult placed for possible rehab placement due to patient inability to ambulate well    Kamryn Ruiz PA-C  08/24/17  1:11 PM    I have interviewed and examined patient with addendum to note above and formulation care plan    Deondre Crabtree M.D.

## 2017-08-24 NOTE — PROGRESS NOTES
[facility] Infectious Diseases Outpatient  IV Antibiotic Orders    1. Diagnosis:  B/l knee septic arthritis, Cx neg  2. Routine PICC/ Jerica/ Portacath Care including PRN cath-flow/activase to declot   3. Antibiotic:  Daptomycin 600mg IV daily through 10/19/17                          Invanz 1gm IV daily through 10/19/17    4. Last labs  Lab Results   Component Value Date/Time    WBC 7.0 08/24/2017 01:26 AM    HGB 8.7 08/24/2017 01:26 AM    HCT 26.3 08/24/2017 01:26 AM    PLATELET 662 02/37/2269 01:26 AM    MCV 91.0 08/24/2017 01:26 AM     Lab Results   Component Value Date/Time    Sodium 139 08/24/2017 01:26 AM    Potassium 3.4 08/24/2017 01:26 AM    Chloride 107 08/24/2017 01:26 AM    CO2 25 08/24/2017 01:26 AM    Anion gap 7 08/24/2017 01:26 AM    Glucose 99 08/24/2017 01:26 AM    BUN 6 08/24/2017 01:26 AM    Creatinine 0.78 08/24/2017 01:26 AM    BUN/Creatinine ratio 8 08/24/2017 01:26 AM    GFR est AA >60 08/24/2017 01:26 AM    GFR est non-AA >60 08/24/2017 01:26 AM    Calcium 8.5 08/24/2017 01:26 AM    Bilirubin, total 0.5 08/12/2017 10:25 PM    AST (SGOT) 9 08/12/2017 10:25 PM    Alk. phosphatase 45 08/12/2017 10:25 PM    Protein, total 5.8 08/12/2017 10:25 PM    Albumin 2.7 08/12/2017 10:25 PM    Globulin 3.1 08/12/2017 10:25 PM    A-G Ratio 0.9 08/12/2017 10:25 PM    ALT (SGPT) 22 08/12/2017 10:25 PM       Last Vanc trough:     5. _+__ Weekly Labs:          _+__CBC/diff/platelets   _+__CPK   _+__BUN/CRT   _+__ESR   _+__CRP         6. Fax Final lab results and critical values to Lilia @[phone]  7. Call urgent lab results to [phone]. Allergies: Allergies   Allergen Reactions    Codeine Nausea and Vomiting    Sulfa (Sulfonamide Antibiotics) Other (comments)     Makes face flush     9. [facility]          Home Health Nursing:  10. Pharmacy Consult for Vancomycin/Aminoglycoside Dosing  11.  First Dose protocol as needed.    12. Please pull PIC line at end of therapy or send to IR for [facility] removal     Home Health: Call Angio at [facility] to schedule [facility] Removal :  P: [phone]    Fax: [phone] or [phone]      Dante Cabral MD

## 2017-08-24 NOTE — PROGRESS NOTES
8/24/2017 11:46 AM [facility] consult noted, referral sent to [address] via [facility]. Final iv abx orders noted. CM sent updates to [facility] if [address] is unable to accept.  Jeronimo Galicia, SARAHW

## 2017-08-24 NOTE — PROGRESS NOTES
Bedside and Verbal shift change report given to Alyx Rome RN (oncoming nurse) by Sandrine Amaya RN (offgoing nurse). Report included the following information SBAR, Kardex, Procedure Summary, Intake/Output, MAR and Recent Results.

## 2017-08-24 NOTE — ROUTINE PROCESS
Bedside and Verbal shift change report given to Goran Ramachandran RN (oncoming nurse) by Sarah Boo RN (offgoing nurse). Report included the following information SBAR, Kardex, OR Summary and MAR.

## 2017-08-24 NOTE — PROGRESS NOTES
Orthopaedic Progress Note  Post Op day: 4 Days Post-Op    August 24, 2017 11:48 AM     Patient: Edwige Jacobo MRN: [id number]  SSN: [id number]    YOB: 1965  Age: 50 y.o. Sex: female      Admit date:  8/19/2017  Date of Surgery:  8/20/2017   Procedures:  Procedure(s):  KNEE ARTHROSCOPY INCISION AND DRAINAGE OF LEFT AND RIGHT KNEE  Admitting Physician:  Jason Cason MD   Surgeon:  Rocky Wood) and Role:     * Jason Cason MD - Primary    Consulting Physician(s): Treatment Team: Attending Provider: Jason Cason MD; Nurse Practitioner: Ly Eller NP; Consulting Provider: Anahi Sears NP; Consulting Provider: Jason Cason MD; Utilization Review: Magan Henderson RN; Consulting Provider: Deondre Crabtree MD; Utilization Review: Essie Noriega RN; Care Manager: Rajeev Mayfield; Consulting Provider: Nicole Wong MD    SUBJECTIVE:     Edwige Jacobo is a 50 y.o. female is 4 Days Post-Op s/p Procedure(s):  KNEE ARTHROSCOPY INCISION AND DRAINAGE OF LEFT AND RIGHT KNEE with ongoing pain in bilateral knees. At rest, pain is a 2 , when OOB and attempts to ambulate, pain a 7. Max 2 person assist with ambulation. Pain has been about the same since after I and D. Pt complaints of low back and neck being stiff from laying in the bed. No weakness. No radicular symptoms. OBJECTIVE:       Physical Exam:  General: Alert, cooperative, no distress. Respiratory: Respirations unlabored  Neurological:  Neurovascular exam within normal limits. Motor: + DF/PF. Musculoskeletal: Bilateral knees with mild effusions, portal sites CDI. No drainage. No erythema. Able to range knee 0-60 without significant pain. On left. Right 0-50 with moderate pain lateral aspect. Mild back pain in paraspinal musculature. 5/5 strength bilateral LE. Sensation intact. Dressing/Wound:  Clean, dry and intact. No significant erythema or swelling.       Vital Signs:      Patient Vitals for the past 8 hrs:   BP Temp Pulse Resp SpO2   17 0847 137/82 97.9 °F (36.6 °C) 88 16 98 %                                          Temp (24hrs), Av.3 °F (36.8 °C), Min:97.6 °F (36.4 °C), Max:99.2 °F (37.3 °C)      Labs:        Recent Labs      17   0126   HCT  26.3*   HGB  8.7*     Lab Results   Component Value Date/Time    Sodium 139 2017 01:26 AM    Potassium 3.4 2017 01:26 AM    Chloride 107 2017 01:26 AM    CO2 25 2017 01:26 AM    Glucose 99 2017 01:26 AM    BUN 6 2017 01:26 AM    Creatinine 0.78 2017 01:26 AM    Calcium 8.5 2017 01:26 AM       PT/OT:        Gait  Base of Support: Widened  Speed/Astrid: Slow  Step Length: Right shortened, Left shortened  Gait Abnormalities: Antalgic, Decreased step clearance, Step to gait  Ambulation - Level of Assistance: Contact guard assistance  Distance (ft): 30 Feet (ft)  Assistive Device: Walker, rolling, Gait belt       Patient mobility  Bed Mobility Training  Rolling: Moderate assistance  Supine to Sit: Moderate assistance  Sit to Supine: Minimum assistance  Transfer Training  Sit to Stand:  Moderate assistance (bed elevated to slide off into standing)  Stand to Sit: Moderate assistance  Bed to Chair: Total assistance      Gait Training  Assistive Device: Walker, rolling, Gait belt  Ambulation - Level of Assistance: Contact guard assistance  Distance (ft): 30 Feet (ft)   Weight Bearing Status  Right Side Weight Bearing: As tolerated  Left Side Weight Bearing: As tolerated        ASSESSMENT / PLAN:   Principal Problem:    Knee pain, left (2017)    Active Problems:    Essential hypertension (2017)      Crohn's disease of both small and large intestine without complication ([address] (4939)      Anemia (2017)      Debilitated patient (2017)      Obese (2017)      UTI (urinary tract infection) (2017)      Septic arthritis of knee, left ([address] (2017)                    Pain Control: Adequate with oral agents and PRN IV narcotics; still with bilateral knee pain. Check sed rate and CRP; discussed with Dr. Capri Aguiar  Muscular back pain. - heating pad; continue to watch   DVT Prophylaxis: Lovenox daily, SCDs, JAMEEL Hose    Therapy/ Weight bearing: WBAT   Wound/ incisional issues: Knees CDI. No drainage, erythema. Medical concerns: Medicine following for crohn's/ anemia/ UTI   Disposition: Need PICC, ID wrote orders for home IV abx. Signed By:  Jason Benavides NP    [address] [place] [address]  Pgr.  [phone]

## 2017-08-24 NOTE — PROGRESS NOTES
[facility] Infectious Diseases Progress Note  Linwood Curry MD, TRISTA Escobedo DO, NP     [facility], [address] [place] [address], [place] [address]  Office: [phone]   Fax: [phone]    2017      Assessment & Plan:   1. B/l knee septic arthritis: synovial aspirate with pus. Cx NGSF, likely due to Abx that patient was getting for her Crohn's flare. In this case, need to cover skin kary, and also GNR and anaerobes, gut kary. Patient has failed Cipro and Flagyl. Daptomycin and Inavnz will be once a day dosing, convenient for patient at home. Outpt orders in chart. Offered to get [facility] today, patient wants to wait another day, says I still have lot of knee pain, not able to walk, I am not ready for [facility] yet. 2. Back and neck stiffness: offered MRI/CT scan, again refused to get done today, says lot of knee pain. 3. Immunocompromised host, Crohn's flare: was on Ramicade when in [place], agree with , need top wait for resolution of infection before we can start biologics again          Subjective:     Continued pain both knee, not able to walk. Also complaining of stiff back and neck with pain in back and neck.      Objective:     Vitals:   Visit Vitals    /82 (BP 1 Location: Left arm, BP Patient Position: At rest)    Pulse 88    Temp 97.9 °F (36.6 °C)    Resp 16    Ht 5' 6\" (1.676 m)    Wt 95.3 kg (210 lb)    LMP 2017 (Approximate)    SpO2 98%    Breastfeeding No    BMI 33.89 kg/m2     Temp (24hrs), Av.3 °F (36.8 °C), Min:97.6 °F (36.4 °C), Max:99.2 °F (37.3 °C)    Recent Labs      17   0126  17   0816  17   0814  17   0117   NA  139  141   --    --    K  3.4*  3.5   --    --    CL  107  108   --    --    CO2  25  29   --    --    BUN  6  6   --    --    CREA  0.78  0.75   --   0.78   GLU  99  110*   --    --    WBC  7.0  6.8  5.5   --    HGB  8.7*  8.0*  6.9*   --    HCT  26.3*  24.5*  21.1*   --    PLT  320  661  323   --        Exam: GENERAL: A young woman, sitting comfortably in the bed, not in any apparent distress. HEENT: Pallor. No icterus. Pupils are equally reactive to light. Extraocular muscle movement intact. LUNGS: Chest clear to auscultation bilaterally. HEART: Regular rate and rhythm, S1, S2.   ABDOMEN: Soft, nontender. Bowel sounds are present. EXTREMITIES: Bilateral knees, some edema and swelling and no erythema. Cultures:     Lab Results   Component Value Date/Time    Culture result: No growth thus far, holding 14 days. 08/20/2017 09:15 AM    Culture result: No growth thus far, holding 14 days. 08/20/2017 09:15 AM    Culture result: No growth thus far, holding 14 days. 08/19/2017 12:30 PM    Culture result: Culture performed on Unspun Fluid 08/19/2017 12:30 PM    Culture result: No growth thus far, holding 14 days.  08/19/2017 12:30 PM       Radiology: reviewed      Medications:        Current Facility-Administered Medications   Medication Dose Route Frequency Last Dose    potassium chloride SR (KLOR-CON 10) tablet 40 mEq  40 mEq Oral Q4H      vancomycin (VANCOCIN) 1500 mg in  ml infusion  1,500 mg IntraVENous Q8H 1,500 mg at 08/24/17 0534    azaTHIOprine (IMURAN) tablet 100 mg  100 mg Oral DAILY AFTER BREAKFAST 100 mg at 08/24/17 0816    mesalamine (DELZICOL) DR capsule 800 mg  800 mg Oral  mg at 08/24/17 0816    cefTRIAXone (ROCEPHIN) 2 g in 0.9% sodium chloride (MBP/ADV) 50 mL  2 g IntraVENous Q24H 2 g at 08/23/17 1755    0.9% sodium chloride infusion 250 mL  250 mL IntraVENous PRN      losartan (COZAAR) tablet 25 mg  25 mg Oral DAILY 25 mg at 08/24/17 0816    metroNIDAZOLE (FLAGYL) tablet 500 mg  500 mg Oral  mg at 08/24/17 0816    acetaminophen (TYLENOL) tablet 650 mg  650 mg Oral Q4H PRN      diphenhydrAMINE (BENADRYL) injection 12.5 mg  12.5 mg IntraVENous Q4H PRN      morphine injection 2 mg  2 mg IntraVENous Q4H PRN 2 mg at 08/20/17 1408    ondansetron (ZOFRAN) injection 4 mg 4 mg IntraVENous Q4H PRN      sodium chloride (NS) flush 5-10 mL  5-10 mL IntraVENous Q8H 10 mL at 08/24/17 0533    sodium chloride (NS) flush 5-10 mL  5-10 mL IntraVENous Q8H 10 mL at 08/24/17 0533    sodium chloride (NS) flush 5-10 mL  5-10 mL IntraVENous PRN      oxyCODONE-acetaminophen (PERCOCET) 5-325 mg per tablet 1 Tab  1 Tab Oral Q4H PRN 1 Tab at 08/24/17 0930    naloxone (NARCAN) injection 0.4 mg  0.4 mg IntraVENous PRN      diphenhydrAMINE (BENADRYL) capsule 25 mg  25 mg Oral Q4H PRN      ondansetron (ZOFRAN ODT) tablet 4 mg  4 mg Oral Q4H PRN      docusate sodium (COLACE) capsule 100 mg  100 mg Oral BID Stopped at 08/21/17 0841    naproxen (NAPROSYN) tablet 500 mg  500 mg Oral BID WITH MEALS 500 mg at 08/24/17 3682         Case discussed with: patient          Maxwell Carrasco MD    Signed By: August 24, 2017 August 24, 2017

## 2017-08-25 LAB — CREAT SERPL-MCNC: 0.73 MG/DL (ref 0.55–1.02)

## 2017-08-25 PROCEDURE — 74011000258 HC RX REV CODE- 258: Performed by: INTERNAL MEDICINE

## 2017-08-25 PROCEDURE — 36415 COLL VENOUS BLD VENIPUNCTURE: CPT | Performed by: ORTHOPAEDIC SURGERY

## 2017-08-25 PROCEDURE — 65270000029 HC RM PRIVATE

## 2017-08-25 PROCEDURE — 74011250637 HC RX REV CODE- 250/637: Performed by: INTERNAL MEDICINE

## 2017-08-25 PROCEDURE — 97530 THERAPEUTIC ACTIVITIES: CPT

## 2017-08-25 PROCEDURE — 82565 ASSAY OF CREATININE: CPT | Performed by: ORTHOPAEDIC SURGERY

## 2017-08-25 PROCEDURE — 74011250637 HC RX REV CODE- 250/637: Performed by: NURSE PRACTITIONER

## 2017-08-25 PROCEDURE — 74011250636 HC RX REV CODE- 250/636: Performed by: NURSE PRACTITIONER

## 2017-08-25 PROCEDURE — 97116 GAIT TRAINING THERAPY: CPT

## 2017-08-25 PROCEDURE — 74011250637 HC RX REV CODE- 250/637: Performed by: FAMILY MEDICINE

## 2017-08-25 PROCEDURE — 74011250636 HC RX REV CODE- 250/636: Performed by: INTERNAL MEDICINE

## 2017-08-25 PROCEDURE — 74011250636 HC RX REV CODE- 250/636: Performed by: FAMILY MEDICINE

## 2017-08-25 RX ORDER — FLUCONAZOLE 100 MG/1
100 TABLET ORAL DAILY
Status: DISCONTINUED | OUTPATIENT
Start: 2017-08-25 | End: 2017-08-30 | Stop reason: HOSPADM

## 2017-08-25 RX ORDER — NYSTATIN 100000 [USP'U]/ML
500000 SUSPENSION ORAL 4 TIMES DAILY
Status: COMPLETED | OUTPATIENT
Start: 2017-08-25 | End: 2017-08-25

## 2017-08-25 RX ADMIN — OXYCODONE HYDROCHLORIDE AND ACETAMINOPHEN 1 TABLET: 5; 325 TABLET ORAL at 05:59

## 2017-08-25 RX ADMIN — NYSTATIN 500000 UNITS: 100000 SUSPENSION ORAL at 10:52

## 2017-08-25 RX ADMIN — METRONIDAZOLE 500 MG: 250 TABLET, FILM COATED ORAL at 08:35

## 2017-08-25 RX ADMIN — AZATHIOPRINE 100 MG: 50 TABLET ORAL at 08:38

## 2017-08-25 RX ADMIN — MESALAMINE 800 MG: 400 CAPSULE, DELAYED RELEASE ORAL at 23:00

## 2017-08-25 RX ADMIN — METRONIDAZOLE 500 MG: 250 TABLET, FILM COATED ORAL at 15:55

## 2017-08-25 RX ADMIN — Medication 10 ML: at 05:58

## 2017-08-25 RX ADMIN — VANCOMYCIN HYDROCHLORIDE 1500 MG: 10 INJECTION, POWDER, LYOPHILIZED, FOR SOLUTION INTRAVENOUS at 16:04

## 2017-08-25 RX ADMIN — Medication 10 ML: at 23:02

## 2017-08-25 RX ADMIN — CEFTRIAXONE 2 G: 2 INJECTION, POWDER, FOR SOLUTION INTRAMUSCULAR; INTRAVENOUS at 18:04

## 2017-08-25 RX ADMIN — DOCUSATE SODIUM 100 MG: 100 CAPSULE ORAL at 08:38

## 2017-08-25 RX ADMIN — OXYCODONE HYDROCHLORIDE AND ACETAMINOPHEN 1 TABLET: 5; 325 TABLET ORAL at 23:00

## 2017-08-25 RX ADMIN — Medication 10 ML: at 15:56

## 2017-08-25 RX ADMIN — Medication 10 ML: at 05:59

## 2017-08-25 RX ADMIN — Medication 2 MG: at 11:02

## 2017-08-25 RX ADMIN — OXYCODONE HYDROCHLORIDE AND ACETAMINOPHEN 1 TABLET: 5; 325 TABLET ORAL at 10:52

## 2017-08-25 RX ADMIN — MESALAMINE 800 MG: 400 CAPSULE, DELAYED RELEASE ORAL at 08:37

## 2017-08-25 RX ADMIN — FLUCONAZOLE 100 MG: 100 TABLET ORAL at 08:38

## 2017-08-25 RX ADMIN — MESALAMINE 800 MG: 400 CAPSULE, DELAYED RELEASE ORAL at 15:55

## 2017-08-25 RX ADMIN — NYSTATIN 500000 UNITS: 100000 SUSPENSION ORAL at 15:55

## 2017-08-25 RX ADMIN — OXYCODONE HYDROCHLORIDE AND ACETAMINOPHEN 1 TABLET: 5; 325 TABLET ORAL at 16:03

## 2017-08-25 RX ADMIN — VANCOMYCIN HYDROCHLORIDE 1500 MG: 10 INJECTION, POWDER, LYOPHILIZED, FOR SOLUTION INTRAVENOUS at 23:00

## 2017-08-25 RX ADMIN — Medication 10 ML: at 23:03

## 2017-08-25 RX ADMIN — VANCOMYCIN HYDROCHLORIDE 1500 MG: 10 INJECTION, POWDER, LYOPHILIZED, FOR SOLUTION INTRAVENOUS at 05:58

## 2017-08-25 RX ADMIN — NAPROXEN 500 MG: 250 TABLET ORAL at 18:04

## 2017-08-25 RX ADMIN — LOSARTAN POTASSIUM 25 MG: 50 TABLET, FILM COATED ORAL at 08:36

## 2017-08-25 RX ADMIN — METRONIDAZOLE 500 MG: 250 TABLET, FILM COATED ORAL at 23:00

## 2017-08-25 RX ADMIN — NAPROXEN 500 MG: 250 TABLET ORAL at 08:35

## 2017-08-25 RX ADMIN — NYSTATIN 500000 UNITS: 100000 SUSPENSION ORAL at 18:04

## 2017-08-25 RX ADMIN — Medication 2 MG: at 03:46

## 2017-08-25 NOTE — PROGRESS NOTES
8/25/2017 10:20 AM [facility] is following pt's progress. Discussed with Ortho PA, no weekend discharge needs identified. CM will follow up on Monday. Planning on rehab with [facility] vs home health through [facility] and iv abx through [facility]. If pt is accepted by [facility] will need to be received.   Toña Garibay, SARAHW

## 2017-08-25 NOTE — PROGRESS NOTES
Orthopaedic Progress Note  Post Op day: 5 Days Post-Op    August 25, 2017 1:28 PM     Patient: Yael Moody MRN: [id number]  SSN: [id number]    YOB: 1965  Age: 50 y.o. Sex: female      Admit date:  8/19/2017  Date of Surgery:  8/20/2017   Procedures:  Procedure(s):  KNEE ARTHROSCOPY INCISION AND DRAINAGE OF LEFT AND RIGHT KNEE  Admitting Physician:  Justin Reeves MD   Surgeon:  Kobi March) and Role:     * Justin Reeves MD - Primary    Consulting Physician(s): Treatment Team: Attending Provider: Justin Reeves MD; Nurse Practitioner: Debi Arzola NP; Consulting Provider: Delma Liu NP; Consulting Provider: Justin Reeves MD; Utilization Review: Justen Joseph RN; Consulting Provider: Hao Lakhani MD; Utilization Review: Benito Olivera RN; Care Manager: Nidia Angelo; Consulting Provider: Meghan Pacheco MD    SUBJECTIVE:     Yael Moody is a 50 y.o. female is 5 Days Post-Op s/p Procedure(s):  KNEE ARTHROSCOPY INCISION AND DRAINAGE OF LEFT AND RIGHT KNEE with an appropriate level of post-operative pain. No complaints of nausea, vomiting, dizziness, lightheadedness, chest pain, or shortness of breath. OBJECTIVE:       Physical Exam:  General: Alert, cooperative, no distress. Respiratory: Respirations unlabored  Neurological:  Neurovascular exam within normal limits. Motor: + DF/PF. Musculoskeletal: Calves soft, supple, non-tender upon palpation. Right and left knee with small effusions. No erythema surrounding either knee. The wounds are healing well. She has pain with ROM (P and A) of both knees. No pain of lumbar spine palpation. NVI distally. Dressing/Wound:  Clean, dry and intact. No significant erythema or swelling.       Vital Signs:      Patient Vitals for the past 8 hrs:   BP Temp Pulse Resp SpO2   08/25/17 1223 135/82 98 °F (36.7 °C) 89 20 97 %   08/25/17 0835 118/83 - 90 - -                                          Temp (24hrs), Av.2 °F (36.8 °C), Min:98 °F (36.7 °C), Max:98.4 °F (36.9 °C)      Labs:        Recent Labs      17   0126   HCT  26.3*   HGB  8.7*     Lab Results   Component Value Date/Time    Sodium 139 2017 01:26 AM    Potassium 3.4 2017 01:26 AM    Chloride 107 2017 01:26 AM    CO2 25 2017 01:26 AM    Glucose 99 2017 01:26 AM    BUN 6 2017 01:26 AM    Creatinine 0.73 2017 01:06 AM    Calcium 8.5 2017 01:26 AM       PT/OT:        Gait  Base of Support: Widened  Speed/Astrid: Slow  Step Length: Right shortened, Left shortened  Gait Abnormalities: Antalgic, Decreased step clearance, Step to gait  Ambulation - Level of Assistance: Contact guard assistance  Distance (ft): 30 Feet (ft)  Assistive Device: Walker, rolling, Gait belt       Patient mobility  Bed Mobility Training  Rolling: Moderate assistance  Supine to Sit: Contact guard assistance  Sit to Supine: Minimum assistance  Transfer Training  Sit to Stand: Minimum assistance, Moderate assistance  Stand to Sit: Moderate assistance  Bed to Chair: Total assistance      Gait Training  Assistive Device: Walker, rolling, Gait belt  Ambulation - Level of Assistance: Contact guard assistance  Distance (ft): 30 Feet (ft)   Weight Bearing Status  Right Side Weight Bearing: As tolerated  Left Side Weight Bearing: As tolerated        ASSESSMENT / PLAN:   Principal Problem:    Knee pain, left (2017)    Active Problems:    Essential hypertension (2017)      Crohn's disease of both small and large intestine without complication ([facility] (7784)      Anemia (2017)      Debilitated patient (2017)      Obese (2017)      UTI (urinary tract infection) (2017)      Septic arthritis of knee, left ([facility] (2017)          A: Bilateral knee septic arthritis     P: 1. Septic arthritis: no growth to date on cultures. ID on board and recommended Daptomycin and Invanz IV until 10/19.   They'll manage changes outpatient if needed. No intervention needed for knees at this time. 2. Debility: Multifactoral- septic arthritis and Crohn's disease. Biologics held per GI due to infection. Will need SNF placement. Not a candidate for rehab. 3. PT: ROM as tolerated. OOB to chair as tolerated. Signed By:  Donya Esposito, [address] [place] [address]  Pgr.  [phone]

## 2017-08-25 NOTE — PROGRESS NOTES
Occupational Therapy:  Attempted to see patient, spoke with nursing. Nursing indicating that patient with increased pain levels, just gave IV morphine and Narco due to her pain levels. We will re-attempt in the afternoon as able. Thank you.

## 2017-08-25 NOTE — PROGRESS NOTES
Bedside and Verbal shift change report given to [address] (oncoming nurse) by Marco A Morelos (offgoing nurse). Report included the following information SBAR, Kardex, Intake/Output, MAR and Recent Results.

## 2017-08-25 NOTE — PROGRESS NOTES
Gastrointestinal Progress Note    8/25/2017    Admit Date: 8/19/2017    Subjective: \"my right knee doesn't feel right\"     New Complaints Today: Complaints of right knee pain and limited ROM, decreased ability to ambulate. Having some lower abdominal pain. Had some mild regurgitation which resolved. No vomiting. Still having diarrhea.      New complaint of full unpleasant oral sensation    Current Facility-Administered Medications   Medication Dose Route Frequency    fluconazole (DIFLUCAN) tablet 100 mg  100 mg Oral DAILY    nystatin (MYCOSTATIN) 100,000 unit/mL oral suspension 500,000 Units  500,000 Units Oral QID    [START ON 8/26/2017] Vancomycin Trough 05:30 8/26  1 Each Other ONCE    vancomycin (VANCOCIN) 1500 mg in  ml infusion  1,500 mg IntraVENous Q8H    azaTHIOprine (IMURAN) tablet 100 mg  100 mg Oral DAILY AFTER BREAKFAST    mesalamine (DELZICOL) DR capsule 800 mg  800 mg Oral TID    cefTRIAXone (ROCEPHIN) 2 g in 0.9% sodium chloride (MBP/ADV) 50 mL  2 g IntraVENous Q24H    0.9% sodium chloride infusion 250 mL  250 mL IntraVENous PRN    losartan (COZAAR) tablet 25 mg  25 mg Oral DAILY    metroNIDAZOLE (FLAGYL) tablet 500 mg  500 mg Oral TID    acetaminophen (TYLENOL) tablet 650 mg  650 mg Oral Q4H PRN    diphenhydrAMINE (BENADRYL) injection 12.5 mg  12.5 mg IntraVENous Q4H PRN    morphine injection 2 mg  2 mg IntraVENous Q4H PRN    ondansetron (ZOFRAN) injection 4 mg  4 mg IntraVENous Q4H PRN    sodium chloride (NS) flush 5-10 mL  5-10 mL IntraVENous Q8H    sodium chloride (NS) flush 5-10 mL  5-10 mL IntraVENous Q8H    sodium chloride (NS) flush 5-10 mL  5-10 mL IntraVENous PRN    oxyCODONE-acetaminophen (PERCOCET) 5-325 mg per tablet 1 Tab  1 Tab Oral Q4H PRN    naloxone (NARCAN) injection 0.4 mg  0.4 mg IntraVENous PRN    diphenhydrAMINE (BENADRYL) capsule 25 mg  25 mg Oral Q4H PRN    ondansetron (ZOFRAN ODT) tablet 4 mg  4 mg Oral Q4H PRN    docusate sodium (COLACE) capsule 100 mg  100 mg Oral BID    naproxen (NAPROSYN) tablet 500 mg  500 mg Oral BID WITH MEALS        Objective:     Blood pressure 135/82, pulse 89, temperature 98 °F (36.7 °C), resp. rate 20, height 5' 6\" (1.676 m), weight 95.3 kg (210 lb), last menstrual period 07/12/2017, SpO2 97 %, not currently breastfeeding. 08/23 1901 - 08/25 0700  In: 500 [I.V.:500]  Out: -     EXAM:  GENERAL: alert, cooperative, no distress, pale Oral candida HEART: regular rate and rhythm, S1, S2 normal, no murmur, click, rub or gallop, LUNGS: chest clear, no wheezing, rales, normal symmetric air entry, ABDOMEN: bowel sounds present, soft, nondistended, less tender in LLQ. No peritoneal signs. EXTREMITY: No lower extremity edema, sensation intact. Limited bilateral knee flexion. Data Review    Recent Results (from the past 24 hour(s))   SED RATE (ESR)    Collection Time: 08/24/17  1:53 PM   Result Value Ref Range    Sed rate, automated 126 (H) 0 - 30 mm/hr   CRP, HIGH SENSITIVITY    Collection Time: 08/24/17  1:53 PM   Result Value Ref Range    CRP, High sensitivity >9.5 mg/L   CREATININE    Collection Time: 08/25/17  1:06 AM   Result Value Ref Range    Creatinine 0.73 0.55 - 1.02 MG/DL    GFR est AA >60 >60 ml/min/1.73m2    GFR est non-AA >60 >60 ml/min/1.73m2         Assessment:     Principal Problem:    Knee pain, left (8/19/2017)    Active Problems:    Essential hypertension (1/4/2017)      Crohn's disease of both small and large intestine without complication ([address] (1/4/3712)      Anemia (8/19/2017)      Debilitated patient (8/19/2017)      Obese (8/19/2017)      UTI (urinary tract infection) (8/19/2017)      Septic arthritis of knee, left ([address] (8/21/2017)        Plan:     1. R/O C diff  2. Oral Diflucan ordered for oral thrush  2. Antibiotic regimen as per infectious disease  3. continue Azathioprine 100 mg daily and Mesalamine 800 mg TID  4.  Per [facility] patient would not tolerate inpatient rehab at this time - will have to consider alternative options  5. Ortho following     Elsie Simon PA-C  08/25/17  12:15PM    I have interviewed and examined patient with addendum to note above and formulation care plan.   On call available as needed over weekend    Ofilia Moritz, M.D.

## 2017-08-25 NOTE — PROGRESS NOTES
NUTRITION   RD Screen      Diet: Regular  Skin: incision   PO Intake: <50%    Estimated Daily Nutrition Requirements:  Kcals:  6549 Kcals/day (BMR(1590x1.3) -250)              Protein: 76 g (-95g/day (0.8-1.0g/kg) ActBW)             Fluid:   1800 mL    Pt screened for nutrition risk d/t LOS. Pt was eating ~50% of meals for the last few days, but has not eaten anything at all today. Pt states pain is affecting appetite and \"drugs\" she's on currently are making her very sleepy. Adding Ensure Enlive - chocolate with all meals until pt can eat >50% of meals. Pt states she had lost 7-10lbs prior to previous admission due to poor appetite and low PO intakes. Pt states she knows she needs to eat something but she's been in pain or asleep all day today. No chew/swallow difficulties. Pt states she \"threw up a little bit in her mouth\" a couple times today. BM are loose, watery. Nutrition Diagnosis: decreased ability to consume sufficient energy   Nutrition Intervention: adding Ensure Enlive TID for added protein/calories. Goal: Pt will consume >50% of meals.   Monitoring and Evaluation: PO intakes, appetite    RD will continue to monitor and will f/u for further nutrition assessment and intervention as appropriate    Education & Discharge Needs:   [] Nutrition related discharge needs addressed:    [x] Supplements (on d/c instruction &/or coupons provided)    [] Education    [] No nutrition related discharge needs at this time     Cultural, Bahai and ethnic food preferences identified    [x]None   [] Yes     Marisol Ford  [phone]

## 2017-08-25 NOTE — PROGRESS NOTES
Bedside shift change report given to Radha Romero (oncoming nurse) by Rosanna Thompson (offgoing nurse). Report included the following information SBAR, Kardex, Intake/Output and MAR.

## 2017-08-25 NOTE — PROGRESS NOTES
Problem: Mobility Impaired (Adult and Pediatric)  Goal: *Acute Goals and Plan of Care (Insert Text)  Physical Therapy Goals  Initiated 8/21/2017  1. Patient will move from supine to sit and sit to supine in bed with independence within 7 day(s). 2. Patient will transfer from bed to chair and chair to bed with modified independence using the least restrictive device within 7 day(s). 3. Patient will perform sit to stand with modified independence within 7 day(s). 4. Patient will ambulate with modified independence for 150 feet with the least restrictive device within 7 day(s). 5. Patient will ascend/descend 4 stairs with 1 handrail(s) with modified independence within 7 day(s). 6. Patient will be independent with HEP for knees within 7 days. PHYSICAL THERAPY TREATMENT  Patient: Israel Escobedo (89 y.o. female)  Date: 8/25/2017  Diagnosis: Knee pain, left  LEFT KNEE INFECTION  Septic arthritis of knee, left (HCC) Knee pain, left  Procedure(s) (LRB):  KNEE ARTHROSCOPY INCISION AND DRAINAGE OF LEFT AND RIGHT KNEE (Left) 5 Days Post-Op  Precautions: Fall, WBAT  Chart, physical therapy assessment, plan of care and goals were reviewed. ASSESSMENT:  Pt comes to sit with mod assist.Pt to stand with bed elevated mod assist of 2. Pt ambulated 30ft with RW CGA. Pt moves slowly with moderat knee pain. Pt requesting back to bed with mod assist.Pt progressing slowly. Continue goals. Progression toward goals:  [ ]      Improving appropriately and progressing toward goals  [X]      Improving slowly and progressing toward goals  [ ]      Not making progress toward goals and plan of care will be adjusted       PLAN:  Patient continues to benefit from skilled intervention to address the above impairments. Continue treatment per established plan of care.   Discharge Recommendations:  [facility] for Rehab  Further Equipment Recommendations for Discharge:  rolling walker       SUBJECTIVE:          OBJECTIVE DATA SUMMARY:   Critical Behavior:  Neurologic State: Alert  Orientation Level: Oriented X4  Cognition: Appropriate safety awareness, Follows commands  Safety/Judgement: Awareness of environment, Fall prevention, Home safety  Functional Mobility Training:  Bed Mobility:                                Transfers:  Sit to Stand: Moderate assistance  Stand to Sit: Moderate assistance;Assist x2                             Balance:  Sitting: Intact  Standing: Intact; With support  Ambulation/Gait Training:  Distance (ft): 30 Feet (ft)  Assistive Device: Walker, rolling;Gait belt  Ambulation - Level of Assistance: Contact guard assistance        Gait Abnormalities: Antalgic;Decreased step clearance        Base of Support: Narrowed     Speed/Astrid: Pace decreased (<100 feet/min)  Step Length: Left shortened;Right shortened                               Pain:  Pain Scale 1: Numeric (0 - 10)  Pain Intensity 1: 2  Pain Location 1: Knee  Pain Orientation 1: Anterior  Pain Description 1: Aching  Pain Intervention(s) 1: Medication (see MAR)  Activity Tolerance:   Pt tolerated treatment fairly well. Please refer to the flowsheet for vital signs taken during this treatment.   After treatment:   [ ] Patient left in no apparent distress sitting up in chair  [X] Patient left in no apparent distress in bed  [ ] Call bell left within reach  [ ] Nursing notified  [ ] Caregiver present  [ ] Bed alarm activated      COMMUNICATION/COLLABORATION:   The patients plan of care was discussed with: Physical Therapist     Corazon Vera PTA   Time Calculation: 23 mins

## 2017-08-25 NOTE — PROGRESS NOTES
Physical Therapy:  Attempted to see patient, spoke with nursing. Nursing indicating that patient with increased pain levels, just gave IV morphine and Narco due to her pain levels. We will re-attempt in the afternoon as able.   Thank you  Bob Roach PT,DPT,NCS

## 2017-08-25 NOTE — PROGRESS NOTES
[facility] [address]  [address] [facility], [address] [place] [address]  [phone]      Medical Progress Note      NAME: Rose Zaragoza   :  1965  MRM:  [id number]    Date/Time: 2017  9:42 AM       Assessment and Plan:     Septic arthritis of knee, left / Knee pain, left - Tolerated surgery. Ortho is attending to pain control, DVT prophylaxis and rehab decisions as usual.  ID is managing Abx. Patient tolerated surgery without complications. Cultures negative so far, ID recommending extended course of vancomycin, flagyl and ceftriaxone    Essential hypertension - BP stable on losartan     Crohn's disease of both small and large intestine without complication - GI has re-started her azathioprine and mesalamine    Anemia - Acute on chronic with iron deficiency. Hb down earlier this week with acute blood loss, but improved with transfusion. He has iron deficiency in the setting of chronic inflammation from IBD     Thrush - Start Nystatin, to fluconazole    Diarrhea - with abd pain, and she is on Flagyl, which would cover C diff. Debilitated patient - mobilize with PT/OT per ortho instructions     Obese - counseled on weight loss      UTI (urinary tract infection) - Enterococcus on urine culture sens to vancomycin. Antibiotics at discharge per ID           Subjective:     Chief Complaint:  Throat pain. Knee pain. Abd pain. Diarrhea    ROS:  (bold if positive, if negative)    DiarrheaAbd PainNausea  Tolerating minimal PT Tolerating minimal Diet        Objective:     Last 24hrs VS reviewed since prior progress note.  Most recent are:    Visit Vitals    /83    Pulse 90    Temp 98.4 °F (36.9 °C)    Resp 20    Ht 5' 6\" (1.676 m)    Wt 95.3 kg (210 lb)    SpO2 98%    Breastfeeding No    BMI 33.89 kg/m2     SpO2 Readings from Last 6 Encounters:   17 98%   17 93%   17 98%   17 98%   17 96%    O2 Flow Rate (L/min): 2 l/min     Intake/Output Summary (Last 24 hours) at 08/25/17 4112  Last data filed at 08/24/17 1459   Gross per 24 hour   Intake              500 ml   Output                0 ml   Net              500 ml        Physical Exam:    Gen:  Obese, frail, ill-appearing, in no acute distress  HEENT:  Pink conjunctivae, PERRL, hearing intact to voice, thrush on mucous membranes  Neck:  Supple, without masses, thyroid non-tender  Resp:  No accessory muscle use, clear breath sounds without wheezes rales or rhonchi  Card:  No murmurs, tachycardic S1, S2 without thrills, bruits or peripheral edema  Abd:  Soft, non-tender, mildly distended, normoactive bowel sounds are present, no mass  Lymph:  No cervical or inguinal adenopathy  Musc:  No cyanosis or clubbing  Skin:  No rashes or ulcers, skin turgor is reduced  Neuro:  Cranial nerves are grossly intact, general motor weakness, follows commands appropriately  Psych:  Good insight, oriented to person, place and time, alert    Telemetry reviewed:   normal sinus rhythm  __________________________________________________________________  Medications Reviewed: (see below)  Medications:     Current Facility-Administered Medications   Medication Dose Route Frequency    fluconazole (DIFLUCAN) tablet 100 mg  100 mg Oral DAILY    vancomycin (VANCOCIN) 1500 mg in  ml infusion  1,500 mg IntraVENous Q8H    azaTHIOprine (IMURAN) tablet 100 mg  100 mg Oral DAILY AFTER BREAKFAST    mesalamine (DELZICOL) DR capsule 800 mg  800 mg Oral TID    cefTRIAXone (ROCEPHIN) 2 g in 0.9% sodium chloride (MBP/ADV) 50 mL  2 g IntraVENous Q24H    0.9% sodium chloride infusion 250 mL  250 mL IntraVENous PRN    losartan (COZAAR) tablet 25 mg  25 mg Oral DAILY    metroNIDAZOLE (FLAGYL) tablet 500 mg  500 mg Oral TID    acetaminophen (TYLENOL) tablet 650 mg  650 mg Oral Q4H PRN    diphenhydrAMINE (BENADRYL) injection 12.5 mg  12.5 mg IntraVENous Q4H PRN    morphine injection 2 mg  2 mg IntraVENous Q4H PRN    ondansetron (ZOFRAN) injection 4 mg  4 mg IntraVENous Q4H PRN    sodium chloride (NS) flush 5-10 mL  5-10 mL IntraVENous Q8H    sodium chloride (NS) flush 5-10 mL  5-10 mL IntraVENous Q8H    sodium chloride (NS) flush 5-10 mL  5-10 mL IntraVENous PRN    oxyCODONE-acetaminophen (PERCOCET) 5-325 mg per tablet 1 Tab  1 Tab Oral Q4H PRN    naloxone (NARCAN) injection 0.4 mg  0.4 mg IntraVENous PRN    diphenhydrAMINE (BENADRYL) capsule 25 mg  25 mg Oral Q4H PRN    ondansetron (ZOFRAN ODT) tablet 4 mg  4 mg Oral Q4H PRN    docusate sodium (COLACE) capsule 100 mg  100 mg Oral BID    naproxen (NAPROSYN) tablet 500 mg  500 mg Oral BID WITH MEALS        Lab Data Reviewed: (see below)  Lab Review:     Recent Labs      08/24/17   0126  08/23/17   0816   WBC  7.0  6.8   HGB  8.7*  8.0*   HCT  26.3*  24.5*   PLT  320  287     Recent Labs      08/25/17   0106  08/24/17   0126  08/23/17   0816   NA   --   139  141   K   --   3.4*  3.5   CL   --   107  108   CO2   --   25  29   GLU   --   99  110*   BUN   --   6  6   CREA  0.73  0.78  0.75   CA   --   8.5  8.3*     No results found for: GLUCPOC  No results for input(s): PH, PCO2, PO2, HCO3, FIO2 in the last 72 hours. No results for input(s): INR in the last 72 hours.     No lab exists for component: INREXT  All Micro Results     Procedure Component Value Units Date/Time    C. DIFFICILE (DNA) [[id number]]     Order Status:  Sent     CULTURE, URINE [[id number]]  (Abnormal)  (Susceptibility) Collected:  08/19/17 1108    Order Status:  Completed Specimen:  Urine from Clean catch Updated:  08/22/17 1117     Special Requests: NO SPECIAL REQUESTS        [place] Count --        >100,000  COLONIES/mL       Culture result:         ENTEROCOCCUS FAECIUM GROUP D (A)    CULTURE, ANAEROBIC [[id number]] Collected:  08/20/17 0915    Order Status:  Completed Specimen:  Surgical Specimen Updated:  08/22/17 0909     Special Requests: --        RIGHT  KNEE  ASPIRATE       Culture result:         No growth thus far, holding 14 days. CULTURE, BODY FLUID Robert Cruise [[id number]] Collected:  08/20/17 0915    Order Status:  Completed Specimen:  Surgical Specimen Updated:  08/22/17 0907     Special Requests: --        RIGHT  KNEE  ASPIRATE       GRAM STAIN 4+ WBCS SEEN         NO ORGANISMS SEEN                 Culture performed on Unspun Fluid     Culture result:         No growth thus far, holding 14 days. CULTURE, BODY FLUID Robert Cruise [[id number]] Collected:  08/19/17 1230    Order Status:  Completed Specimen:  Knee Fluid Updated:  08/21/17 0911     Special Requests: NO SPECIAL REQUESTS        GRAM STAIN         PLEASE REFER TO STAT GRAM STAIN     Culture result:         Culture performed on Unspun Fluid              No growth thus far, holding 14 days. CULTURE, ANAEROBIC [[id number]] Collected:  08/19/17 1230    Order Status:  Completed Specimen:  Knee  Updated:  08/21/17 0910     Special Requests: NO SPECIAL REQUESTS        Culture result:         No growth thus far, holding 14 days. Marifer Xu [[id number]] Collected:  08/19/17 1230    Order Status:  Completed Specimen:  Knee Fluid Updated:  08/19/17 1820     Special Requests: NO SPECIAL REQUESTS        GRAM STAIN 3+ WBCS SEEN         NO ORGANISMS SEEN       CULTURE, URINE [[id number]]     Order Status:  Canceled Specimen:  Urine from Clean catch           I have reviewed notes of prior 24hr.     Other pertinent lab: none    Total time spent with patient: [address] [place] [address] discussed with: Patient, Nursing Staff and >50% of time spent in counseling and coordination of care    Discussed:  Care Plan    Prophylaxis:  H2B/PPI    Disposition:  Home w/Family           ___________________________________________________    Attending Physician: Pillo Go MD

## 2017-08-25 NOTE — PROGRESS NOTES
Occupational Therapy Note: Second attempt to see pt and she again insisted on using a bed pan prior to getting out of bed. Max encouragement to use bedside commode however pt refused stating \" Its going to fall out. \" Of note, pt has not eaten breakfast or lunch. Will attempt OT next treatment date.

## 2017-08-26 LAB
ALBUMIN SERPL-MCNC: 1.4 G/DL (ref 3.5–5)
ALBUMIN/GLOB SERPL: 0.4 {RATIO} (ref 1.1–2.2)
ALP SERPL-CCNC: 172 U/L (ref 45–117)
ALT SERPL-CCNC: 12 U/L (ref 12–78)
ANION GAP SERPL CALC-SCNC: 7 MMOL/L (ref 5–15)
AST SERPL-CCNC: 16 U/L (ref 15–37)
BILIRUB SERPL-MCNC: 0.5 MG/DL (ref 0.2–1)
BUN SERPL-MCNC: 8 MG/DL (ref 6–20)
BUN/CREAT SERPL: 10 (ref 12–20)
CALCIUM SERPL-MCNC: 8.4 MG/DL (ref 8.5–10.1)
CHLORIDE SERPL-SCNC: 108 MMOL/L (ref 97–108)
CO2 SERPL-SCNC: 25 MMOL/L (ref 21–32)
CREAT SERPL-MCNC: 0.82 MG/DL (ref 0.55–1.02)
DATE LAST DOSE: ABNORMAL
ERYTHROCYTE [DISTWIDTH] IN BLOOD BY AUTOMATED COUNT: 15.1 % (ref 11.5–14.5)
GLOBULIN SER CALC-MCNC: 4 G/DL (ref 2–4)
GLUCOSE SERPL-MCNC: 108 MG/DL (ref 65–100)
HCT VFR BLD AUTO: 26 % (ref 35–47)
HGB BLD-MCNC: 8.6 G/DL (ref 11.5–16)
MAGNESIUM SERPL-MCNC: 1.9 MG/DL (ref 1.6–2.4)
MCH RBC QN AUTO: 30.4 PG (ref 26–34)
MCHC RBC AUTO-ENTMCNC: 33.1 G/DL (ref 30–36.5)
MCV RBC AUTO: 91.9 FL (ref 80–99)
PHOSPHATE SERPL-MCNC: 3.3 MG/DL (ref 2.6–4.7)
PLATELET # BLD AUTO: 338 K/UL (ref 150–400)
POTASSIUM SERPL-SCNC: 3.5 MMOL/L (ref 3.5–5.1)
PROT SERPL-MCNC: 5.4 G/DL (ref 6.4–8.2)
RBC # BLD AUTO: 2.83 M/UL (ref 3.8–5.2)
REPORTED DOSE,DOSE: ABNORMAL UNITS
REPORTED DOSE/TIME,TMG: 2200
SODIUM SERPL-SCNC: 140 MMOL/L (ref 136–145)
VANCOMYCIN TROUGH SERPL-MCNC: 28 UG/ML (ref 5–10)
WBC # BLD AUTO: 10.7 K/UL (ref 3.6–11)

## 2017-08-26 PROCEDURE — 74011250637 HC RX REV CODE- 250/637: Performed by: NURSE PRACTITIONER

## 2017-08-26 PROCEDURE — 74011000258 HC RX REV CODE- 258: Performed by: PHYSICIAN ASSISTANT

## 2017-08-26 PROCEDURE — 74011250637 HC RX REV CODE- 250/637: Performed by: INTERNAL MEDICINE

## 2017-08-26 PROCEDURE — 84100 ASSAY OF PHOSPHORUS: CPT | Performed by: INTERNAL MEDICINE

## 2017-08-26 PROCEDURE — 74011250636 HC RX REV CODE- 250/636: Performed by: FAMILY MEDICINE

## 2017-08-26 PROCEDURE — 65270000029 HC RM PRIVATE

## 2017-08-26 PROCEDURE — 74011250636 HC RX REV CODE- 250/636: Performed by: NURSE PRACTITIONER

## 2017-08-26 PROCEDURE — 74011250637 HC RX REV CODE- 250/637: Performed by: PHYSICIAN ASSISTANT

## 2017-08-26 PROCEDURE — 74011250636 HC RX REV CODE- 250/636: Performed by: PHYSICIAN ASSISTANT

## 2017-08-26 PROCEDURE — 74011250637 HC RX REV CODE- 250/637: Performed by: FAMILY MEDICINE

## 2017-08-26 PROCEDURE — 97530 THERAPEUTIC ACTIVITIES: CPT

## 2017-08-26 PROCEDURE — 85027 COMPLETE CBC AUTOMATED: CPT | Performed by: INTERNAL MEDICINE

## 2017-08-26 PROCEDURE — 80202 ASSAY OF VANCOMYCIN: CPT | Performed by: NURSE PRACTITIONER

## 2017-08-26 PROCEDURE — 83735 ASSAY OF MAGNESIUM: CPT | Performed by: INTERNAL MEDICINE

## 2017-08-26 PROCEDURE — 36415 COLL VENOUS BLD VENIPUNCTURE: CPT | Performed by: INTERNAL MEDICINE

## 2017-08-26 PROCEDURE — 80053 COMPREHEN METABOLIC PANEL: CPT | Performed by: INTERNAL MEDICINE

## 2017-08-26 PROCEDURE — 97116 GAIT TRAINING THERAPY: CPT

## 2017-08-26 RX ORDER — METRONIDAZOLE 250 MG/1
500 TABLET ORAL 3 TIMES DAILY
Status: DISCONTINUED | OUTPATIENT
Start: 2017-08-26 | End: 2017-08-27

## 2017-08-26 RX ORDER — NYSTATIN 100000 [USP'U]/ML
500000 SUSPENSION ORAL 4 TIMES DAILY
Status: DISCONTINUED | OUTPATIENT
Start: 2017-08-26 | End: 2017-08-30 | Stop reason: HOSPADM

## 2017-08-26 RX ADMIN — NAPROXEN 500 MG: 250 TABLET ORAL at 07:47

## 2017-08-26 RX ADMIN — METRONIDAZOLE 500 MG: 250 TABLET, FILM COATED ORAL at 21:34

## 2017-08-26 RX ADMIN — Medication 2 MG: at 07:47

## 2017-08-26 RX ADMIN — METRONIDAZOLE 500 MG: 250 TABLET, FILM COATED ORAL at 09:18

## 2017-08-26 RX ADMIN — OXYCODONE HYDROCHLORIDE AND ACETAMINOPHEN 1 TABLET: 5; 325 TABLET ORAL at 03:29

## 2017-08-26 RX ADMIN — Medication 10 ML: at 14:17

## 2017-08-26 RX ADMIN — METRONIDAZOLE 500 MG: 250 TABLET, FILM COATED ORAL at 17:11

## 2017-08-26 RX ADMIN — Medication 10 ML: at 14:18

## 2017-08-26 RX ADMIN — MESALAMINE 800 MG: 400 CAPSULE, DELAYED RELEASE ORAL at 21:34

## 2017-08-26 RX ADMIN — VANCOMYCIN HYDROCHLORIDE 1000 MG: 1 INJECTION, POWDER, LYOPHILIZED, FOR SOLUTION INTRAVENOUS at 17:11

## 2017-08-26 RX ADMIN — NAPROXEN 500 MG: 250 TABLET ORAL at 17:10

## 2017-08-26 RX ADMIN — OXYCODONE HYDROCHLORIDE AND ACETAMINOPHEN 1 TABLET: 5; 325 TABLET ORAL at 17:25

## 2017-08-26 RX ADMIN — NYSTATIN 500000 UNITS: 100000 SUSPENSION ORAL at 21:34

## 2017-08-26 RX ADMIN — CEFTRIAXONE 2 G: 2 INJECTION, POWDER, FOR SOLUTION INTRAMUSCULAR; INTRAVENOUS at 17:11

## 2017-08-26 RX ADMIN — Medication 10 ML: at 21:34

## 2017-08-26 RX ADMIN — FLUCONAZOLE 100 MG: 100 TABLET ORAL at 09:18

## 2017-08-26 RX ADMIN — MESALAMINE 800 MG: 400 CAPSULE, DELAYED RELEASE ORAL at 17:11

## 2017-08-26 RX ADMIN — MESALAMINE 800 MG: 400 CAPSULE, DELAYED RELEASE ORAL at 09:18

## 2017-08-26 RX ADMIN — NYSTATIN 500000 UNITS: 100000 SUSPENSION ORAL at 14:17

## 2017-08-26 RX ADMIN — VANCOMYCIN HYDROCHLORIDE 1000 MG: 1 INJECTION, POWDER, LYOPHILIZED, FOR SOLUTION INTRAVENOUS at 23:11

## 2017-08-26 RX ADMIN — LOSARTAN POTASSIUM 25 MG: 50 TABLET, FILM COATED ORAL at 09:18

## 2017-08-26 RX ADMIN — VANCOMYCIN HYDROCHLORIDE 1500 MG: 10 INJECTION, POWDER, LYOPHILIZED, FOR SOLUTION INTRAVENOUS at 06:14

## 2017-08-26 RX ADMIN — AZATHIOPRINE 100 MG: 50 TABLET ORAL at 09:17

## 2017-08-26 RX ADMIN — NYSTATIN 500000 UNITS: 100000 SUSPENSION ORAL at 17:10

## 2017-08-26 RX ADMIN — OXYCODONE HYDROCHLORIDE AND ACETAMINOPHEN 1 TABLET: 5; 325 TABLET ORAL at 23:11

## 2017-08-26 RX ADMIN — OXYCODONE HYDROCHLORIDE AND ACETAMINOPHEN 1 TABLET: 5; 325 TABLET ORAL at 11:58

## 2017-08-26 NOTE — PROGRESS NOTES
Problem: Mobility Impaired (Adult and Pediatric)  Goal: *Acute Goals and Plan of Care (Insert Text)  Physical Therapy Goals  Initiated 8/21/2017  1. Patient will move from supine to sit and sit to supine in bed with independence within 7 day(s). 2. Patient will transfer from bed to chair and chair to bed with modified independence using the least restrictive device within 7 day(s). 3. Patient will perform sit to stand with modified independence within 7 day(s). 4. Patient will ambulate with modified independence for 150 feet with the least restrictive device within 7 day(s). 5. Patient will ascend/descend 4 stairs with 1 handrail(s) with modified independence within 7 day(s). 6. Patient will be independent with HEP for knees within 7 days. PHYSICAL THERAPY TREATMENT  Patient: Zaina Vasquez (13 y.o. female)  Date: 8/26/2017  Diagnosis: Knee pain, left  LEFT KNEE INFECTION  Septic arthritis of knee, left (HCC) Knee pain, left  Procedure(s) (LRB):  KNEE ARTHROSCOPY INCISION AND DRAINAGE OF LEFT AND RIGHT KNEE (Left) 6 Days Post-Op  Precautions: Fall, WBAT      ASSESSMENT:  Pt received in bed, agreeable to participate with physical therapy. Min a for bed mobility>sitting EOB. Tolerates limited knee flexion ~30 degrees. Mod A for for sit<>stand using RW for steadying. Pt very tearful with transfers and WB. Stand pivot using RW to [facility]. Max A for hygiene. Gait training x 10', around end of bed, with min A. Reports of R foot pain, along metatarsal arch. PT returned to bed, with min A to manage BLE. Pt slow to mobilize and may benefit from short stay rehab to improve functional mobility before returning home.   Progression toward goals:  [ ]    Improving appropriately and progressing toward goals  [X]    Improving slowly and progressing toward goals  [ ]    Not making progress toward goals and plan of care will be adjusted       PLAN:  Patient continues to benefit from skilled intervention to address the above impairments. Continue treatment per established plan of care. Discharge Recommendations:  Rehab  Further Equipment Recommendations for Discharge:  RW       SUBJECTIVE:   Patient stated it hurts so much.       OBJECTIVE DATA SUMMARY:   Critical Behavior:  Neurologic State: Alert  Orientation Level: Oriented X4  Cognition: Appropriate safety awareness  Safety/Judgement: Awareness of environment, Fall prevention, Home safety  Functional Mobility Training:  Bed Mobility:     Supine to Sit: Minimum assistance  Sit to Supine: Minimum assistance           Transfers:  Sit to Stand: Moderate assistance  Stand to Sit: Minimum assistance                             Balance:  Sitting: Intact  Standing - Static: Good;Constant support  Standing - Dynamic : Fair  Ambulation/Gait Training:  Distance (ft): 10 Feet (ft)  Assistive Device: Walker, rolling;Gait belt  Ambulation - Level of Assistance: Minimal assistance        Gait Abnormalities: Antalgic;Decreased step clearance; Step to gait        Base of Support: Widened     Speed/Astrid: Slow;Shuffled                                  Stairs:                       Neuro Re-Education:     Therapeutic Exercises:      Pain:  Pain Scale 1: Numeric (0 - 10)  Pain Intensity 1: 6  Pain Location 1: Knee  Pain Orientation 1: Left;Right  Pain Description 1: Aching     Activity Tolerance:   Fair  Please refer to the flowsheet for vital signs taken during this treatment.   After treatment:   [ ]    Patient left in no apparent distress sitting up in chair  [X]    Patient left in no apparent distress in bed  [X]    Call bell left within reach  [X]    Nursing notified  [ ]    Caregiver present  [ ]    Bed alarm activated      COMMUNICATION/COLLABORATION:   The patients plan of care was discussed with: Registered Nurse     Madai Mcmahon   Time Calculation: 40 mins

## 2017-08-26 NOTE — PROGRESS NOTES
Bedside and Verbal shift change report given to Jose Maria Manzanares RN (oncoming nurse) by Dany Amaya RN (offgoing nurse). Report included the following information SBAR, Kardex, OR Summary, Procedure Summary, Intake/Output, MAR and Recent Results.

## 2017-08-26 NOTE — PROGRESS NOTES
[facility] [address]  [address] [facility] [address] [place] [address]  [phone]      Medical Progress Note      NAME: Rose Zaragoza   :  1965  MRM:  [id number]    Date/Time: 2017  9:42 AM       Assessment and Plan:     Septic arthritis of knee, left / Knee pain, left - Tolerated surgery. Ortho is attending to pain control, DVT prophylaxis and rehab decisions as usual.  ID is managing Abx. Patient tolerated surgery without complications. Cultures negative so far, ID recommending extended course of invanz and dapto. She is currently on vancomycin, flagyl and ceftriaxone    Essential hypertension - BP stable on losartan     Crohn's disease of both small and large intestine without complication - GI has re-started her azathioprine and mesalamine    Anemia - Acute on chronic with iron deficiency. Hb down earlier this week with acute blood loss, but improved with transfusion. He has iron deficiency in the setting of chronic inflammation from IBD     Thrush - Continue Nystatin, she may be passing fluconazole tablet without absorption    Diarrhea - With abd pain. C diff ordered, but stool not sent. I see she is on Flagyl, which would cover C diff anyway. I do not think she has C diff. Debilitated patient - mobilize with PT/OT per ortho instructions     Obese - counseled on weight loss      UTI (urinary tract infection) - Enterococcus on urine culture sens to vancomycin. Antibiotics at discharge per ID           Subjective:     Chief Complaint:  Throat pain better. Knee pain unchanged. Abd pain and diarrhea persist    ROS:  (bold if positive, if negative)    DiarrheaAbd PainNausea  Tolerating minimal PT Tolerating minimal Diet        Objective:     Last 24hrs VS reviewed since prior progress note.  Most recent are:    Visit Vitals    /83    Pulse 89    Temp 98.3 °F (36.8 °C)    Resp 15    Ht 5' 6\" (1.676 m)    Wt 95.3 kg (210 lb)    SpO2 98%    Breastfeeding No    BMI 33.89 kg/m2     SpO2 Readings from Last 6 Encounters:   08/26/17 98%   08/17/17 93%   08/11/17 98%   08/03/17 98%   01/04/17 96%    O2 Flow Rate (L/min): 2 l/min   No intake or output data in the 24 hours ending 08/26/17 1031     Physical Exam:    Gen:  Obese, frail, ill-appearing, in no acute distress  HEENT:  Pink conjunctivae, PERRL, hearing intact to voice, thrush on mucous membranes  Neck:  Supple, without masses, thyroid non-tender  Resp:  No accessory muscle use, clear breath sounds without wheezes rales or rhonchi  Card:  No murmurs, tachycardic S1, S2 without thrills, bruits or peripheral edema  Abd:  Soft, non-tender, mildly distended, normoactive bowel sounds are present, no mass  Lymph:  No cervical or inguinal adenopathy  Musc:  No cyanosis or clubbing  Skin:  No rashes or ulcers, skin turgor is reduced  Neuro:  Cranial nerves are grossly intact, general motor weakness, follows commands appropriately  Psych:  Good insight, oriented to person, place and time, alert, anxious and depressed.     Telemetry reviewed:   normal sinus rhythm  __________________________________________________________________  Medications Reviewed: (see below)  Medications:     Current Facility-Administered Medications   Medication Dose Route Frequency    vancomycin (VANCOCIN) 1,000 mg in 0.9% sodium chloride (MBP/ADV) 250 mL  1,000 mg IntraVENous Q8H    fluconazole (DIFLUCAN) tablet 100 mg  100 mg Oral DAILY    azaTHIOprine (IMURAN) tablet 100 mg  100 mg Oral DAILY AFTER BREAKFAST    mesalamine (DELZICOL) DR capsule 800 mg  800 mg Oral TID    cefTRIAXone (ROCEPHIN) 2 g in 0.9% sodium chloride (MBP/ADV) 50 mL  2 g IntraVENous Q24H    0.9% sodium chloride infusion 250 mL  250 mL IntraVENous PRN    losartan (COZAAR) tablet 25 mg  25 mg Oral DAILY    metroNIDAZOLE (FLAGYL) tablet 500 mg  500 mg Oral TID    acetaminophen (TYLENOL) tablet 650 mg  650 mg Oral Q4H PRN    diphenhydrAMINE (BENADRYL) injection 12.5 mg  12.5 mg IntraVENous Q4H PRN    morphine injection 2 mg  2 mg IntraVENous Q4H PRN    ondansetron (ZOFRAN) injection 4 mg  4 mg IntraVENous Q4H PRN    sodium chloride (NS) flush 5-10 mL  5-10 mL IntraVENous Q8H    sodium chloride (NS) flush 5-10 mL  5-10 mL IntraVENous Q8H    sodium chloride (NS) flush 5-10 mL  5-10 mL IntraVENous PRN    oxyCODONE-acetaminophen (PERCOCET) 5-325 mg per tablet 1 Tab  1 Tab Oral Q4H PRN    naloxone (NARCAN) injection 0.4 mg  0.4 mg IntraVENous PRN    diphenhydrAMINE (BENADRYL) capsule 25 mg  25 mg Oral Q4H PRN    ondansetron (ZOFRAN ODT) tablet 4 mg  4 mg Oral Q4H PRN    docusate sodium (COLACE) capsule 100 mg  100 mg Oral BID    naproxen (NAPROSYN) tablet 500 mg  500 mg Oral BID WITH MEALS        Lab Data Reviewed: (see below)  Lab Review:     Recent Labs      08/26/17 0517 08/24/17 0126   WBC  10.7  7.0   HGB  8.6*  8.7*   HCT  26.0*  26.3*   PLT  338  320     Recent Labs      08/26/17 0517 08/25/17   0106  08/24/17   0126   NA  140   --   139   K  3.5   --   3.4*   CL  108   --   107   CO2  25   --   25   GLU  108*   --   99   BUN  8   --   6   CREA  0.82  0.73  0.78   CA  8.4*   --   8.5   MG  1.9   --    --    PHOS  3.3   --    --    ALB  1.4*   --    --    TBILI  0.5   --    --    SGOT  16   --    --    ALT  12   --    --      No results found for: GLUCPOC  No results for input(s): PH, PCO2, PO2, HCO3, FIO2 in the last 72 hours. No results for input(s): INR in the last 72 hours.     No lab exists for component: INREXT, INREXT  All Micro Results     Procedure Component Value Units Date/Time    C. DIFFICILE (DNA) [[id number]]     Order Status:  Sent     C. DIFFICILE (DNA) [[id number]]     Order Status:  Canceled     CULTURE, URINE [[id number]]  (Abnormal)  (Susceptibility) Collected:  08/19/17 1108    Order Status:  Completed Specimen:  Urine from Clean catch Updated:  08/22/17 1116     Special Requests: NO SPECIAL REQUESTS        [place] Count -- >100,000  COLONIES/mL       Culture result:         ENTEROCOCCUS FAECIUM GROUP D (A)    CULTURE, ANAEROBIC [[id number]] Collected:  08/20/17 0915    Order Status:  Completed Specimen:  Surgical Specimen Updated:  08/22/17 0909     Special Requests: --        RIGHT  KNEE  ASPIRATE       Culture result:         No growth thus far, holding 14 days. CULTURE, BODY FLUID Anthony Ruggiero [[id number]] Collected:  08/20/17 0915    Order Status:  Completed Specimen:  Surgical Specimen Updated:  08/22/17 0907     Special Requests: --        RIGHT  KNEE  ASPIRATE       GRAM STAIN 4+ WBCS SEEN         NO ORGANISMS SEEN                 Culture performed on Unspun Fluid     Culture result:         No growth thus far, holding 14 days. CULTURE, BODY FLUID Johnnieadore Tomerills [[id number]] Collected:  08/19/17 1230    Order Status:  Completed Specimen:  Knee Fluid Updated:  08/21/17 0911     Special Requests: NO SPECIAL REQUESTS        GRAM STAIN         PLEASE REFER TO STAT GRAM STAIN     Culture result:         Culture performed on Unspun Fluid              No growth thus far, holding 14 days. CULTURE, ANAEROBIC [[id number]] Collected:  08/19/17 1230    Order Status:  Completed Specimen:  Knee  Updated:  08/21/17 0910     Special Requests: NO SPECIAL REQUESTS        Culture result:         No growth thus far, holding 14 days. Mitch Rocha [[id number]] Collected:  08/19/17 1230    Order Status:  Completed Specimen:  Knee Fluid Updated:  08/19/17 1820     Special Requests: NO SPECIAL REQUESTS        GRAM STAIN 3+ WBCS SEEN         NO ORGANISMS SEEN       CULTURE, URINE [[id number]]     Order Status:  Canceled Specimen:  Urine from Clean catch           I have reviewed notes of prior 24hr.     Other pertinent lab: none    Total time spent with patient: [address] discussed with: Patient, Nursing Staff and >50% of time spent in counseling and coordination of care    Discussed:  Care Plan    Prophylaxis: H2B/PPI    Disposition:  Home w/Family           ___________________________________________________    Attending Physician: Mireille Miranda MD

## 2017-08-26 NOTE — PROGRESS NOTES
[facility] Dosing Services: Antimicrobial Stewardship Daily Doc    Consult for Vancomycin dosing by Zuleima Boggs Sa NP  Indication: Bone and Joint infection  Day of Therapy 8    Vancomycin therapy:  Current maintenance dose: 1500 (mg) every 8 hours (frequency). Trough goal 15-20 mcg/mL. Last trough level 28 mcg/ml drawn early. Corrected trough 23 mcg/ml. Adjustment in Therapy:1000 mg every 8 hours  Dose calculated to approximate a therapeutic trough of 16 mcg/mL. Pharmacy to follow daily.   Pharmacist Claudio LOCKWOOD:[phone]

## 2017-08-26 NOTE — CONSULTS
Rheumatology    Presumed septic arthritis, both knees, cultures negative so far, no crystals. This could have been a partially treated septic arthritis as she was previously on antibiotics She is 2 days post arthroscopic debridement/ lavage. Crohn's disease could be seen w/ a systemic inflammatory arthropathy but only her knees are involved, no other joint inflammation, deformity or nodules. Agree w/ full course of antibiotics for septic arthritis to clear the way for starting a biologic, which will help inflammatory arthropathy anyway. She had been on Remicade several years ago which was helpful. Full note to be dictated.

## 2017-08-26 NOTE — PROGRESS NOTES
Orthopaedic Progress Note  Post Op day: 6 Days Post-Op    2017 11:15 AM     Patient: Cele Buck MRN: [id number]  SSN: [id number]    YOB: 1965  Age: 50 y.o. Sex: female      Admit date:  2017  Date of Surgery:  2017   Procedures:  Procedure(s):  KNEE ARTHROSCOPY INCISION AND DRAINAGE OF LEFT AND RIGHT KNEE  Admitting Physician:  Ting Isabel MD   Surgeon:  Leona Ramos) and Role:     * Ting Isabel MD - Primary    Consulting Physician(s): Treatment Team: Attending Provider: Ting Isabel MD; Nurse Practitioner: Sergey Mahan NP; Consulting Provider: Celia Cortés NP; Consulting Provider: Ting Isabel MD; Utilization Review: Juliane Lopez RN; Consulting Provider: Rory García MD; Utilization Review: Alfie Fish RN; Care Manager: Oma King; Consulting Provider: Aman Gonzalez MD    SUBJECTIVE:     Cele Buck is a 50 y.o. female is 6 Days Post-Op s/p Procedure(s):  KNEE ARTHROSCOPY INCISION AND DRAINAGE OF LEFT AND RIGHT KNEE with an appropriate level of post-operative pain. No complaints of nausea, vomiting, dizziness, lightheadedness, chest pain, or shortness of breath. OBJECTIVE:       Physical Exam:  General: Alert, cooperative, no distress. Respiratory: Respirations unlabored  Neurological:  Neurovascular exam within normal limits. Motor: + DF/PF. Musculoskeletal: Calves soft, supple, non-tender upon palpation. Dressing/Wound:  Clean, dry and intact. No significant erythema or swelling.       Vital Signs:      Patient Vitals for the past 8 hrs:   BP Temp Pulse Resp SpO2   17 1101 (!) 145/96 98.8 °F (37.1 °C) 91 18 98 %   17 0918 129/83 - 89 - -   17 0418 129/83 98.3 °F (36.8 °C) 89 15 98 %                                          Temp (24hrs), Av.3 °F (36.8 °C), Min:97.6 °F (36.4 °C), Max:98.8 °F (37.1 °C)      Labs:        Recent Labs      17   0517   HCT  26.0*   HGB  8.6*     Lab Results   Component Value Date/Time    Sodium 140 08/26/2017 05:17 AM    Potassium 3.5 08/26/2017 05:17 AM    Chloride 108 08/26/2017 05:17 AM    CO2 25 08/26/2017 05:17 AM    Glucose 108 08/26/2017 05:17 AM    BUN 8 08/26/2017 05:17 AM    Creatinine 0.82 08/26/2017 05:17 AM    Calcium 8.4 08/26/2017 05:17 AM       PT/OT:        Gait  Base of Support: Narrowed  Speed/Astrid: Pace decreased (<100 feet/min)  Step Length: Left shortened, Right shortened  Gait Abnormalities: Antalgic, Decreased step clearance  Ambulation - Level of Assistance: Contact guard assistance  Distance (ft): 30 Feet (ft)  Assistive Device: Walker, rolling, Gait belt       Patient mobility  Bed Mobility Training  Rolling: Moderate assistance  Supine to Sit: Contact guard assistance  Sit to Supine: Minimum assistance  Transfer Training  Sit to Stand:  Moderate assistance  Stand to Sit: Moderate assistance, Assist x2  Bed to Chair: Total assistance      Gait Training  Assistive Device: Walker, rolling, Gait belt  Ambulation - Level of Assistance: Contact guard assistance  Distance (ft): 30 Feet (ft)   Weight Bearing Status  Right Side Weight Bearing: As tolerated  Left Side Weight Bearing: As tolerated        ASSESSMENT / PLAN:   Principal Problem:    Knee pain, left (8/19/2017)    Active Problems:    Essential hypertension (1/4/2017)      Crohn's disease of both small and large intestine without complication ([facility] [address] (0/9/5138)      Anemia (8/19/2017)      Debilitated patient (8/19/2017)      Obese (8/19/2017)      UTI (urinary tract infection) (8/19/2017)      Septic arthritis of knee, left (HCC) (8/21/2017)                    Pain Control: Adequate with oral agents and PRN IV narcotics   DVT Prophylaxis: Lovenox daily, SCDs, JAMEEL Hose    Therapy/ Weight bearing: WBAT   Wound/ incisional issues: Vimal knees, healing well, no erythema   Medical concerns: Bilateral Knee cultures negative   Disposition: SA[facility]     ESR and CRP still elevated, knees edema resolved, no erythema, no continue signs of infection. Cultures still negative. Question if Rheum in nature. IV abx changed today per ID recs on 8/24. Appreciate Rheumotology input. Signed By:  Jayesh Mata PA-C    [id number] [facility].  [phone]

## 2017-08-26 NOTE — PROGRESS NOTES
Bedside shift change report given to Victor Hugo Teresa (oncoming nurse) by Yoselyn Benjamin (offgoing nurse). Report included the following information SBAR, Kardex, Intake/Output and MAR.

## 2017-08-27 ENCOUNTER — APPOINTMENT (OUTPATIENT)
Dept: GENERAL RADIOLOGY | Age: 52
DRG: 488 | End: 2017-08-27
Attending: ANESTHESIOLOGY
Payer: COMMERCIAL

## 2017-08-27 ENCOUNTER — ANESTHESIA EVENT (OUTPATIENT)
Dept: MEDSURG UNIT | Age: 52
DRG: 488 | End: 2017-08-27
Payer: COMMERCIAL

## 2017-08-27 ENCOUNTER — ANESTHESIA (OUTPATIENT)
Dept: MEDSURG UNIT | Age: 52
DRG: 488 | End: 2017-08-27
Payer: COMMERCIAL

## 2017-08-27 LAB
ALBUMIN SERPL-MCNC: 1.3 G/DL (ref 3.5–5)
ALBUMIN/GLOB SERPL: 0.4 {RATIO} (ref 1.1–2.2)
ALP SERPL-CCNC: 180 U/L (ref 45–117)
ALT SERPL-CCNC: 12 U/L (ref 12–78)
ANION GAP SERPL CALC-SCNC: 7 MMOL/L (ref 5–15)
APPEARANCE FLD: ABNORMAL
AST SERPL-CCNC: 15 U/L (ref 15–37)
BASOPHILS # BLD: 0 K/UL (ref 0–0.1)
BASOPHILS NFR BLD: 0 % (ref 0–1)
BILIRUB SERPL-MCNC: 0.4 MG/DL (ref 0.2–1)
BODY FLD TYPE: NORMAL
BUN SERPL-MCNC: 8 MG/DL (ref 6–20)
BUN/CREAT SERPL: 11 (ref 12–20)
CALCIUM SERPL-MCNC: 7.9 MG/DL (ref 8.5–10.1)
CHLORIDE SERPL-SCNC: 108 MMOL/L (ref 97–108)
CO2 SERPL-SCNC: 28 MMOL/L (ref 21–32)
COLOR FLD: ABNORMAL
CREAT SERPL-MCNC: 0.69 MG/DL (ref 0.55–1.02)
CREAT SERPL-MCNC: 0.74 MG/DL (ref 0.55–1.02)
CRP SERPL-MCNC: 23.9 MG/DL
CRYSTALS FLD MICRO: NORMAL
DIFFERENTIAL METHOD BLD: ABNORMAL
EOSINOPHIL # BLD: 0 K/UL (ref 0–0.4)
EOSINOPHIL NFR BLD: 0 % (ref 0–7)
ERYTHROCYTE [DISTWIDTH] IN BLOOD BY AUTOMATED COUNT: 15.3 % (ref 11.5–14.5)
ERYTHROCYTE [SEDIMENTATION RATE] IN BLOOD: 127 MM/HR (ref 0–30)
GLOBULIN SER CALC-MCNC: 3.7 G/DL (ref 2–4)
GLUCOSE SERPL-MCNC: 141 MG/DL (ref 65–100)
HCT VFR BLD AUTO: 25.4 % (ref 35–47)
HGB BLD-MCNC: 8.1 G/DL (ref 11.5–16)
LYMPHOCYTES # BLD: 0.4 K/UL (ref 0.8–3.5)
LYMPHOCYTES NFR BLD: 4 % (ref 12–49)
LYMPHOCYTES NFR FLD: 14 %
MCH RBC QN AUTO: 29.6 PG (ref 26–34)
MCHC RBC AUTO-ENTMCNC: 31.9 G/DL (ref 30–36.5)
MCV RBC AUTO: 92.7 FL (ref 80–99)
METAMYELOCYTES NFR BLD MANUAL: 4 %
MONOCYTES # BLD: 0.7 K/UL (ref 0–1)
MONOCYTES NFR BLD: 6 % (ref 5–13)
MONOS+MACROS NFR FLD: 8 %
MYELOCYTES NFR BLD MANUAL: 2 %
NEUTROPHILS NFR FLD: 78 %
NEUTS BAND NFR BLD MANUAL: 4 % (ref 0–6)
NEUTS SEG # BLD: 9.3 K/UL (ref 1.8–8)
NEUTS SEG NFR BLD: 80 % (ref 32–75)
NRBC # BLD: 0 K/UL (ref 0–0.01)
NRBC BLD-RTO: 0 PER 100 WBC
NUC CELL # FLD: 4130 /CU MM (ref 0–5)
PLATELET # BLD AUTO: 308 K/UL (ref 150–400)
POTASSIUM SERPL-SCNC: 3 MMOL/L (ref 3.5–5.1)
PROT SERPL-MCNC: 5 G/DL (ref 6.4–8.2)
RBC # BLD AUTO: 2.74 M/UL (ref 3.8–5.2)
RBC # FLD: >100 /CU MM
RBC MORPH BLD: ABNORMAL
SODIUM SERPL-SCNC: 143 MMOL/L (ref 136–145)
SPECIMEN SOURCE FLD: ABNORMAL
WBC # BLD AUTO: 11.1 K/UL (ref 3.6–11)
WBC MORPH BLD: ABNORMAL

## 2017-08-27 PROCEDURE — 74011250636 HC RX REV CODE- 250/636: Performed by: FAMILY MEDICINE

## 2017-08-27 PROCEDURE — 74011250637 HC RX REV CODE- 250/637: Performed by: INTERNAL MEDICINE

## 2017-08-27 PROCEDURE — 86140 C-REACTIVE PROTEIN: CPT | Performed by: PHYSICIAN ASSISTANT

## 2017-08-27 PROCEDURE — 74011250636 HC RX REV CODE- 250/636: Performed by: PHYSICIAN ASSISTANT

## 2017-08-27 PROCEDURE — 85025 COMPLETE CBC W/AUTO DIFF WBC: CPT | Performed by: PHYSICIAN ASSISTANT

## 2017-08-27 PROCEDURE — C1751 CATH, INF, PER/CENT/MIDLINE: HCPCS | Performed by: ANESTHESIOLOGY

## 2017-08-27 PROCEDURE — 87205 SMEAR GRAM STAIN: CPT | Performed by: PHYSICIAN ASSISTANT

## 2017-08-27 PROCEDURE — 71010 XR CHEST PORT: CPT

## 2017-08-27 PROCEDURE — 36415 COLL VENOUS BLD VENIPUNCTURE: CPT | Performed by: NURSE PRACTITIONER

## 2017-08-27 PROCEDURE — 65270000029 HC RM PRIVATE

## 2017-08-27 PROCEDURE — 74011250637 HC RX REV CODE- 250/637: Performed by: NURSE PRACTITIONER

## 2017-08-27 PROCEDURE — 02HV33Z INSERTION OF INFUSION DEVICE INTO SUPERIOR VENA CAVA, PERCUTANEOUS APPROACH: ICD-10-PCS | Performed by: ANESTHESIOLOGY

## 2017-08-27 PROCEDURE — 74011000258 HC RX REV CODE- 258: Performed by: PHYSICIAN ASSISTANT

## 2017-08-27 PROCEDURE — 85652 RBC SED RATE AUTOMATED: CPT | Performed by: PHYSICIAN ASSISTANT

## 2017-08-27 PROCEDURE — 74011250637 HC RX REV CODE- 250/637: Performed by: FAMILY MEDICINE

## 2017-08-27 PROCEDURE — 89060 EXAM SYNOVIAL FLUID CRYSTALS: CPT | Performed by: PHYSICIAN ASSISTANT

## 2017-08-27 PROCEDURE — 80053 COMPREHEN METABOLIC PANEL: CPT | Performed by: NURSE PRACTITIONER

## 2017-08-27 PROCEDURE — 89050 BODY FLUID CELL COUNT: CPT | Performed by: PHYSICIAN ASSISTANT

## 2017-08-27 PROCEDURE — 97110 THERAPEUTIC EXERCISES: CPT

## 2017-08-27 PROCEDURE — 74011250637 HC RX REV CODE- 250/637: Performed by: PHYSICIAN ASSISTANT

## 2017-08-27 RX ORDER — LIDOCAINE HYDROCHLORIDE 10 MG/ML
3 INJECTION, SOLUTION EPIDURAL; INFILTRATION; INTRACAUDAL; PERINEURAL ONCE
Status: DISPENSED | OUTPATIENT
Start: 2017-08-27 | End: 2017-08-27

## 2017-08-27 RX ORDER — METRONIDAZOLE 250 MG/1
500 TABLET ORAL 3 TIMES DAILY
Status: DISCONTINUED | OUTPATIENT
Start: 2017-08-27 | End: 2017-08-29

## 2017-08-27 RX ADMIN — OXYCODONE HYDROCHLORIDE AND ACETAMINOPHEN 1 TABLET: 5; 325 TABLET ORAL at 22:49

## 2017-08-27 RX ADMIN — NYSTATIN 500000 UNITS: 100000 SUSPENSION ORAL at 08:50

## 2017-08-27 RX ADMIN — Medication 10 ML: at 23:10

## 2017-08-27 RX ADMIN — Medication 10 ML: at 13:52

## 2017-08-27 RX ADMIN — MESALAMINE 800 MG: 400 CAPSULE, DELAYED RELEASE ORAL at 08:47

## 2017-08-27 RX ADMIN — OXYCODONE HYDROCHLORIDE AND ACETAMINOPHEN 1 TABLET: 5; 325 TABLET ORAL at 03:45

## 2017-08-27 RX ADMIN — AZATHIOPRINE 100 MG: 50 TABLET ORAL at 08:45

## 2017-08-27 RX ADMIN — OXYCODONE HYDROCHLORIDE AND ACETAMINOPHEN 1 TABLET: 5; 325 TABLET ORAL at 18:47

## 2017-08-27 RX ADMIN — NAPROXEN 500 MG: 250 TABLET ORAL at 18:45

## 2017-08-27 RX ADMIN — VANCOMYCIN HYDROCHLORIDE 1000 MG: 1 INJECTION, POWDER, LYOPHILIZED, FOR SOLUTION INTRAVENOUS at 12:30

## 2017-08-27 RX ADMIN — DAPTOMYCIN 600 MG: 500 INJECTION, POWDER, LYOPHILIZED, FOR SOLUTION INTRAVENOUS at 18:57

## 2017-08-27 RX ADMIN — NYSTATIN 500000 UNITS: 100000 SUSPENSION ORAL at 13:49

## 2017-08-27 RX ADMIN — Medication 10 ML: at 13:53

## 2017-08-27 RX ADMIN — MEROPENEM 500 MG: 500 INJECTION, POWDER, FOR SOLUTION INTRAVENOUS at 14:49

## 2017-08-27 RX ADMIN — MEROPENEM 500 MG: 500 INJECTION, POWDER, FOR SOLUTION INTRAVENOUS at 20:30

## 2017-08-27 RX ADMIN — OXYCODONE HYDROCHLORIDE AND ACETAMINOPHEN 1 TABLET: 5; 325 TABLET ORAL at 13:50

## 2017-08-27 RX ADMIN — MESALAMINE 800 MG: 400 CAPSULE, DELAYED RELEASE ORAL at 22:49

## 2017-08-27 RX ADMIN — MESALAMINE 800 MG: 400 CAPSULE, DELAYED RELEASE ORAL at 18:47

## 2017-08-27 RX ADMIN — NYSTATIN 500000 UNITS: 100000 SUSPENSION ORAL at 22:49

## 2017-08-27 RX ADMIN — METRONIDAZOLE 500 MG: 250 TABLET, FILM COATED ORAL at 08:50

## 2017-08-27 RX ADMIN — NAPROXEN 500 MG: 250 TABLET ORAL at 08:47

## 2017-08-27 RX ADMIN — LOSARTAN POTASSIUM 25 MG: 50 TABLET, FILM COATED ORAL at 08:49

## 2017-08-27 RX ADMIN — FLUCONAZOLE 100 MG: 100 TABLET ORAL at 08:47

## 2017-08-27 RX ADMIN — METRONIDAZOLE 500 MG: 250 TABLET, FILM COATED ORAL at 20:29

## 2017-08-27 RX ADMIN — OXYCODONE HYDROCHLORIDE AND ACETAMINOPHEN 1 TABLET: 5; 325 TABLET ORAL at 08:44

## 2017-08-27 RX ADMIN — NYSTATIN 500000 UNITS: 100000 SUSPENSION ORAL at 18:44

## 2017-08-27 NOTE — ROUTINE PROCESS
Orthopedic & Surgical Nursing Communication Tool      8:00 AM  8/27/2017     Bedside and Verbal shift change report given to Audie Muhammad RN (incoming nurse) by Leann Pate RN (outgoing nurse) on Court Puller a 50 y.o. female who was admitted on 8/19/2017 10:14 AM. Report included the following information SBAR, Procedure Summary, Intake/Output and MAR. Patient currently has no IV access, new IV placement attempted by myself, night shift charge, as well as an ICU nurse. Oncoming nurse will look into getting a PICC line placed if possible. Opportunity for questions and clarifications were given to the incoming nurse. Patient's bed is in low position, side rails x3, bed alarm on, and call bell within reach. Hourly rounding performed throughout shift.     Leann Pate RN

## 2017-08-27 NOTE — CONSULTS
[facility]   [address] [facility] [facility]   [address] [facility]       Name:  Stephanie Nuñez   MR#:  [id number]   :  1965   Account #:  [de-identified]    Date of Consultation:  2017   Date of Adm:  2017       RHEUMATOLOGY CONSULTATION    ATTENDING: Santo Beckwith MD.     REASON FOR CONSULTATION: Evaluate knee pain. HISTORY OF PRESENT ILLNESS: The patient is a 16-year-old white   female with a several year history of Crohn disease with small and   large bowel involvement without complications. She had been on   Remicade in the past, but she stopped this when she moved out of   [facility]. She has done fairly well until approximately 7 weeks ago when   she developed severe GI complaints along with bloody diarrhea. She   wanted to hold off on the biologics and be managed with more   conservative medications. Currently, she is on Imuran and mesalamine. This is not really helping   her all that much. About a week ago, she developed severe knee pain,   this was followed by the opposite knee getting involved. Pain is   progressed. She did receive one cortisone shot in the office but this   lasted only temporarily. She was brought to the hospital and treated for   presumed bilateral septic arthritis. She received b/l knee arthroscopies/ lavage, findings revealed purulent joint effusions, with a white count of   50,000, 80% lymphocytes. The Gram stain did not reveal any bacteria. Cultures are negative so far. There were no crystals. The patient tells me she has never had any knee pain or any joint pain   associated with arthritis or her Crohn disease. This is the first time she   has had any significant joint pain. Does have some mild back pain, but   no other joints involved such as hands, elbows and feet. PAST MEDICAL HISTORY:   1. Crohn disease with small and large bowel involvement, no   complication.    2. Anemia, likely related to chronic disease. 3. Obesity. 4. Hypertension. ALLERGIES:   1. SULFA. 2. CODEINE. CURRENT MEDICATIONS:   1. Imuran   2. Rocephin. 3. Vancomycin. 4. Diflucan. 5. Losartan. 6. Mesalamine. 7. Flagyl. 8. Nystatin. SOCIAL HISTORY: No alcohol or tobacco.    FAMILY HISTORY: Noncontributory. REVIEW OF SYSTEMS   GENERAL: No fever or chills. SKIN: No rashes. LYMPHATIC: no enlarged lymph nodes or blood clots. HEENT: no mouth ulcers, dry mouth, dry eyes. GI: abdominal pain w/ diarrhea, blood. PULMONARY: no shortness of breath or pleuritic pain. CARDIOVASCULAR: no Raynaud's symptoms. : no hematuria. MUSCULOSKELETAL: per HPI  NEUROLOGICAL: no numbness in extremities or headaches. PHYSICAL EXAM   VITAL SIGNS: Temperature 98.6, blood pressure 145/96, pulse 91,   respirations 18, pulse oximeter 98% on room air. HEENT: Benign. No facial rash or eye redness. SKIN: no rash   PULMONARY: No respiratory distress. Trachea midline. CARDIOVASCULAR: Regular rate and rhythm. ABDOMEN: Soft, mild tenderness. EXTREMITIES: Without clubbing, cyanosis or edema. MUSCULOSKELETAL: No active synovitis, joint deformities or   nodules. Her knees are wrapped. Examination was not able to be   done. . Her ankles are not tender. NEUROLOGIC: Alert and oriented x4. Moves all 4 extremities. LABORATORY DATA: Hemoglobin 8.7, white count 7000, platelets   460,236. Electrolytes unremarkable. Creatinine 0.73, glucose 108. Liver function tests, calcium, magnesium were unremarkable. Sedimentation rate elevated at 106. Chlamydia and GC are negative. Culture results as stated above. IMPRESSION:   1. Crohn disease with large and small bowel involvement without   complications. She still has active symptoms despite being on Imuran   and mesalamine. Consideration is being given to start her on   a biologic again after the knee issues are cleared up. 2. Presumed bilateral septic arthritis of both knees. Looks like she   probably had a partially treated septic arthritis as she was on   antibiotics prior to getting any of the synovial fluid data. Infectious   disease is recommending a full course of IV antibiotics to treat this. I   am certainly in agreement, this will be able to clear the way for her to   get back on her biologic. Crohn disease can cause an inflammatory   arthropathy. Technically, her knee pain could be related to this, but due   to the degree of purulence in her joint fluid and the fact that culture   data is obscured I would favor proceeding with full course of IV   antibiotics versus septic arthritis. I would like to thank you for the opportunity to participate in the   patient's health care.         Ezequiel Neff., MD Mechele Ganser / Marcelino.Eugenio   D:  08/27/2017   10:41   T:  08/27/2017   15:49   Job #:  [id number]

## 2017-08-27 NOTE — PROGRESS NOTES
Problem: Mobility Impaired (Adult and Pediatric)  Goal: *Acute Goals and Plan of Care (Insert Text)  Physical Therapy Goals  Initiated 8/21/2017  1. Patient will move from supine to sit and sit to supine in bed with independence within 7 day(s). 2. Patient will transfer from bed to chair and chair to bed with modified independence using the least restrictive device within 7 day(s). 3. Patient will perform sit to stand with modified independence within 7 day(s). 4. Patient will ambulate with modified independence for 150 feet with the least restrictive device within 7 day(s). 5. Patient will ascend/descend 4 stairs with 1 handrail(s) with modified independence within 7 day(s). 6. Patient will be independent with HEP for knees within 7 days. PHYSICAL THERAPY TREATMENT  Patient: Bart Gaytan (58 y.o. female)  Date: 8/27/2017  Diagnosis: Knee pain, left  LEFT KNEE INFECTION  Septic arthritis of knee, left (HCC) Knee pain, left  Procedure(s) (LRB):  KNEE ARTHROSCOPY INCISION AND DRAINAGE OF LEFT AND RIGHT KNEE (Left) 7 Days Post-Op  Precautions: Fall, WBAT      ASSESSMENT:  Patient agrees to have PT but refusing to get OOB stating she \"hasn't moved her knees and wants to do some exercises in the bed\". Patient reports minimal pain at rest, however with movement patient reports 10/10 and becoming tearful/sobbing due to high pain level. See below for bed exercises. PT attempting AAROM for knee flexion on the R with increasing pain only able to tolerate approx 20 deg,  L slightly less painful with approx 25 deg. PT encouraging heel slide with strap in bed in between PT sessions and demonstrated with patient. PT asking patient to roll onto side to attempt GE knee flexion. Patient sobbing due to pain and unable to tolerate any additional range. AT this point anaesthesiologist and PA entering the room for evaluation.   PT placed ice on R knee with advice to keep on for 20min then switch ice to L LE with assist from nursing tech. Patient c/o fatigue after session. Progression toward goals:  [ ]      Improving appropriately and progressing toward goals  [X]      Improving slowly and progressing toward goals  [ ]      Not making progress toward goals and plan of care will be adjusted       PLAN:  Patient continues to benefit from skilled intervention to address the above impairments. Continue treatment per established plan of care. Discharge Recommendations:  Rehab  Further Equipment Recommendations for Discharge:  TBD       SUBJECTIVE:   Patient stated I don't want to get up but I do want to move and exercise my legs.       OBJECTIVE DATA SUMMARY:   Critical Behavior:  Neurologic State: Alert  Orientation Level: Oriented X4  Cognition: Appropriate for age attention/concentration, Follows commands  Safety/Judgement: Awareness of environment, Fall prevention, Home safety  Range of Motion: R knee 0-20. L knee 0-25                          Functional Mobility Training:  Bed Mobility:  Rolling:  Moderate assistance;Assist x1 (LE supported)                 Transfers:refused                                   Balance:refused     Ambulation/Gait Training:refused                                                                   Stairs:unable           Therapeutic Exercises:     EXERCISE   Sets   Reps   Active Active Assist   Passive Self ROM   Comments   Ankle Pumps 1 15 [X]                                        [ ]                                        [ ]                                        [ ]                                            Quad Sets 1 10 [X]                                        [ ]                                        [ ]                                        [ ]                                            Hamstring Sets 1 8 [ ]                                        [ ]                                        [ ]                                        [ ]                                        Tactile assistance   Micheline Bhat     [ ]                                        [ ]                                        [ ]                                        [ ]                                        unable   Guille Driver       [ ]                                          [ ]                                          [ ]                                          [ ]                                            Heel Slides 1 5 [ ]                                        [X]                                        [ ]                                        [ ]                                        0-20 high pain   Samuel Temitope     [ ]                                        [ ]                                        [ ]                                        [ ]                                        unable   Knee Flexion Stretch     [ ]                                        [ ]                                        [ ]                                        [ ]                                        unable   Straight Leg Raises     [ ]                                        [ ]                                        [ ]                                        [ ]                                        unable      Pain:  Pain Scale 1: Numeric (0 - 10)  Pain Intensity 1: 3  Pain Location 1: Knee  Pain Orientation 1: Left;Right  Pain Description 1: Aching  Pain Intervention(s) 1: Medication (see MAR)  Activity Tolerance:   Poor. See above  Please refer to the flowsheet for vital signs taken during this treatment.   After treatment:   [ ] Patient left in no apparent distress sitting up in chair  [X] Patient left in no apparent distress in bed  [X] Call bell left within reach  [X] Nursing notified  [ ] Caregiver present  [ ] Bed alarm activated      COMMUNICATION/COLLABORATION:   The patients plan of care was discussed with: Registered Nurse and CARLI Faria DPT   Time Calculation: 26 mins

## 2017-08-27 NOTE — ANESTHESIA PROCEDURE NOTES
Central Line Placement    Start time: 8/27/2017 11:00 AM  End time: 8/27/2017 11:25 AM  Performed by: Jean Pierre Thornton  Authorized by: Jean Pierre Thornton     Indications: vascular access  Preanesthetic Checklist: patient identified, risks and benefits discussed, anesthesia consent, site marked, patient being monitored and timeout performed    Timeout Time: 11:42       Pre-procedure: All elements of maximal sterile barrier technique followed?  Yes    Maximal barrier precautions followed, 2% Chlorhexidine for cutaneous antisepsis, Hand hygiene performed prior to catheter insertion and Ultrasound guidance    Sterile Ultrasound Technique followed?: Yes          Procedure:   Prep:  Chlorhexidine  Location:  Internal jugular  Orientation:  Right  Patient position:  Trendelenburg  Catheter type:  Triple lumen  Catheter size:  8 Fr  Catheter length:  16 cm  Number of attempts:  1  Successful placement: Yes      Assessment:   Post-procedure:  Catheter secured, sterile dressing applied and sterile dressing with CHG applied  Assessment:  Blood return through all ports, free fluid flow, placement verified by x-ray and guidewire removal verified  Insertion:  Uncomplicated  Patient tolerance:  Patient tolerated the procedure well with no immediate complications   Seldinger technique, venous access verified by gravity and ultrasound

## 2017-08-27 NOTE — PROGRESS NOTES
Ortho Daily Progress Note    NAME: Rose Zaragoza   :  1965   MRN:  [id number]   DATE:             2017    POD:  7 Days Post-Op  S/P:  Procedure(s):  KNEE ARTHROSCOPY INCISION AND DRAINAGE OF LEFT AND RIGHT KNEE    SUBJECTIVE:  Afebrile/VSS  Doing well without complaints of nausea  Pain well controlled  Calves soft/NTTP Bilaterally  Main complaint today is belly pain. Moderate amount of right knee pain. Lab Results   Component Value Date/Time    HGB 8.1 2017 11:25 AM     Incision OK; no drainage  Dressings clean and dry  Moving LE well  Neurocirculatory exam WNL.   Recent Labs      17   1125   HGB  8.1*   HCT  25.4*   NA  143   K  3.0*   CL  108   CO2  28   BUN  8   CREA  0.74   GLU  141*     Patient Vitals for the past 12 hrs:   BP Temp Pulse Resp SpO2   17 0805 134/80 98.6 °F (37 °C) 83 18 99 %   17 0346 129/81 98 °F (36.7 °C) 83 15 96 %       PLAN:  Right knee aspiration today with decreased cell count  Slow to improve bilateral knee pain  Tolerating regular diet  WBAT with PT-mobilization  Pain Control  Plan to D/C to SNF/Rehab for IV abx pending GI eval on Monday  Rehab/Hospitalist consult    Mo Baker MD

## 2017-08-27 NOTE — PROGRESS NOTES
Sravan Loredo hafsa [address] 81  [address] [facility], [address] [place] [address]  [phone]      Medical Progress Note      NAME: Rosalia Evans   :  1965  MRM:  [id number]    Date/Time: 2017  10:29 AM       Assessment and Plan:     Septic arthritis of knee, left / Knee pain, left - Tolerated surgery. Ortho is attending to pain control, DVT prophylaxis and rehab decisions as usual.  ID is managing Abx. Patient tolerated surgery without complications. Cultures negative so far, ID recommending extended course of invanz and dapto. She is currently on vancomycin, flagyl and ceftriaxone. Ortho plans to tap knee again today to see if infection is present again    Essential hypertension - BP stable on losartan     Crohn's disease of both small and large intestine without complication - GI has re-started her azathioprine and mesalamine. I have consulted GI to ensure they see her every day, since her GI issues, diarrhea, anorexia are all limited her recovery and discharge    Anemia - Acute on chronic with iron deficiency. Hb down earlier this week with acute blood loss, but improved with transfusion. He has iron deficiency in the setting of chronic inflammation from IBD. Needs iron, and once central line present could consider IV forms for now.     Thrush - Continue Nystatin, she may be passing fluconazole tablet without absorption    Diarrhea - With abd pain. C diff ordered, but stool not sent. I see she is on Flagyl, which would cover C diff anyway. I do not think she has C diff, but I will re-order the lab    Debilitated patient - mobilize with PT/OT per ortho instructions, she is not making much progress     Obese - counseled on weight loss      UTI (urinary tract infection) - Enterococcus on urine culture sens to vancomycin. Antibiotics at discharge per ID           Subjective:     Chief Complaint:  Throat pain better. Knee pain worse.   Abd pain and diarrhea persist    ROS:  (bold if positive, if negative)    DiarrheaAbd PainNausea  Tolerating minimal PT Tolerating minimal Diet        Objective:     Last 24hrs VS reviewed since prior progress note. Most recent are:    Visit Vitals    /80 (BP 1 Location: Left arm, BP Patient Position: At rest)    Pulse 83    Temp 98.6 °F (37 °C)    Resp 18    Ht 5' 6\" (1.676 m)    Wt 95.3 kg (210 lb)    SpO2 99%    Breastfeeding No    BMI 33.89 kg/m2     SpO2 Readings from Last 6 Encounters:   08/27/17 99%   08/17/17 93%   08/11/17 98%   08/03/17 98%   01/04/17 96%    O2 Flow Rate (L/min): 2 l/min       Intake/Output Summary (Last 24 hours) at 08/27/17 1029  Last data filed at 08/27/17 0434   Gross per 24 hour   Intake               50 ml   Output                1 ml   Net               49 ml        Physical Exam:    Gen:  Obese, frail, ill-appearing, in no acute distress  HEENT:  Pink conjunctivae, PERRL, hearing intact to voice, thrush on mucous membranes  Neck:  Supple, without masses, thyroid non-tender  Resp:  No accessory muscle use, clear breath sounds without wheezes rales or rhonchi  Card:  No murmurs, tachycardic S1, S2 without thrills, bruits or peripheral edema  Abd:  Soft, non-tender, mildly distended, normoactive bowel sounds are present, no mass  Lymph:  No cervical or inguinal adenopathy  Musc:  No cyanosis or clubbing  Skin:  No rashes or ulcers, skin turgor is reduced  Neuro:  Cranial nerves are grossly intact, general motor weakness, follows commands appropriately  Psych:  Good insight, oriented to person, place and time, alert, anxious and depressed.     Telemetry reviewed:   normal sinus rhythm  __________________________________________________________________  Medications Reviewed: (see below)  Medications:     Current Facility-Administered Medications   Medication Dose Route Frequency    lidocaine (PF) (XYLOCAINE) 10 mg/mL (1 %) injection 3 mL  3 mL SubCUTAneous ONCE    nystatin (MYCOSTATIN) 100,000 unit/mL oral suspension 500,000 Units  500,000 Units Oral QID    cefTRIAXone (ROCEPHIN) 2 g in 0.9% sodium chloride (MBP/ADV) 50 mL  2 g IntraVENous Q24H    metroNIDAZOLE (FLAGYL) tablet 500 mg  500 mg Oral TID    vancomycin (VANCOCIN) 1,000 mg in 0.9% sodium chloride (MBP/ADV) 250 mL  1,000 mg IntraVENous Q8H    fluconazole (DIFLUCAN) tablet 100 mg  100 mg Oral DAILY    azaTHIOprine (IMURAN) tablet 100 mg  100 mg Oral DAILY AFTER BREAKFAST    mesalamine (DELZICOL) DR capsule 800 mg  800 mg Oral TID    0.9% sodium chloride infusion 250 mL  250 mL IntraVENous PRN    losartan (COZAAR) tablet 25 mg  25 mg Oral DAILY    acetaminophen (TYLENOL) tablet 650 mg  650 mg Oral Q4H PRN    diphenhydrAMINE (BENADRYL) injection 12.5 mg  12.5 mg IntraVENous Q4H PRN    morphine injection 2 mg  2 mg IntraVENous Q4H PRN    ondansetron (ZOFRAN) injection 4 mg  4 mg IntraVENous Q4H PRN    sodium chloride (NS) flush 5-10 mL  5-10 mL IntraVENous Q8H    sodium chloride (NS) flush 5-10 mL  5-10 mL IntraVENous Q8H    sodium chloride (NS) flush 5-10 mL  5-10 mL IntraVENous PRN    oxyCODONE-acetaminophen (PERCOCET) 5-325 mg per tablet 1 Tab  1 Tab Oral Q4H PRN    naloxone (NARCAN) injection 0.4 mg  0.4 mg IntraVENous PRN    diphenhydrAMINE (BENADRYL) capsule 25 mg  25 mg Oral Q4H PRN    ondansetron (ZOFRAN ODT) tablet 4 mg  4 mg Oral Q4H PRN    docusate sodium (COLACE) capsule 100 mg  100 mg Oral BID    naproxen (NAPROSYN) tablet 500 mg  500 mg Oral BID WITH MEALS        Lab Data Reviewed: (see below)  Lab Review:     Recent Labs      08/26/17 0517   WBC  10.7   HGB  8.6*   HCT  26.0*   PLT  338     Recent Labs      08/27/17   0322  08/26/17   0517  08/25/17   0106   NA   --   140   --    K   --   3.5   --    CL   --   108   --    CO2   --   25   --    GLU   --   108*   --    BUN   --   8   --    CREA  0.69  0.82  0.73   CA   --   8.4*   --    MG   --   1.9   --    PHOS   --   3.3   --    ALB   --   1.4*   --    TBILI   --   0.5   --    SGOT --   16   --    ALT   --   12   --      No results found for: GLUCPOC  No results for input(s): PH, PCO2, PO2, HCO3, FIO2 in the last 72 hours. No results for input(s): INR in the last 72 hours. No lab exists for component: INREXT, INREXT  All Micro Results     Procedure Component Value Units Date/Time    C. DIFFICILE (DNA) [[id number]]     Order Status:  Sent     CULTURE, BODY FLUID Shane Oven STAIN [[id number]]     Order Status:  Sent Specimen:  Synovial Fluid     C. DIFFICILE (DNA) [[id number]]     Order Status:  Sent     C. DIFFICILE (DNA) [[id number]]     Order Status:  Canceled     CULTURE, URINE [[id number]]  (Abnormal)  (Susceptibility) Collected:  08/19/17 1108    Order Status:  Completed Specimen:  Urine from Clean catch Updated:  08/22/17 1117     Special Requests: NO SPECIAL REQUESTS        [place] Count --        >100,000  COLONIES/mL       Culture result:         ENTEROCOCCUS FAECIUM GROUP D (A)    CULTURE, ANAEROBIC [[id number]] Collected:  08/20/17 0915    Order Status:  Completed Specimen:  Surgical Specimen Updated:  08/22/17 0909     Special Requests: --        RIGHT  KNEE  ASPIRATE       Culture result:         No growth thus far, holding 14 days. CULTURE, BODY FLUID [place] Geraldine [[id number]] Collected:  08/20/17 0915    Order Status:  Completed Specimen:  Surgical Specimen Updated:  08/22/17 0907     Special Requests: --        RIGHT  KNEE  ASPIRATE       GRAM STAIN 4+ WBCS SEEN         NO ORGANISMS SEEN                 Culture performed on Unspun Fluid     Culture result:         No growth thus far, holding 14 days. CULTURE, BODY FLUID Olivia Geraldine [[id number]] Collected:  08/19/17 1230    Order Status:  Completed Specimen:  Knee Fluid Updated:  08/21/17 0911     Special Requests: NO SPECIAL REQUESTS        GRAM STAIN         PLEASE REFER TO STAT GRAM STAIN     Culture result:         Culture performed on Unspun Fluid              No growth thus far, holding 14 days.     CULTURE, ANAEROBIC [[id number]] Collected:  08/19/17 1230    Order Status:  Completed Specimen:  Knee  Updated:  08/21/17 0910     Special Requests: NO SPECIAL REQUESTS        Culture result:         No growth thus far, holding 14 days. Igor Johnson [[id number]] Collected:  08/19/17 1230    Order Status:  Completed Specimen:  Knee Fluid Updated:  08/19/17 1820     Special Requests: NO SPECIAL REQUESTS        GRAM STAIN 3+ WBCS SEEN         NO ORGANISMS SEEN       CULTURE, URINE [[id number]]     Order Status:  Canceled Specimen:  Urine from Clean catch           I have reviewed notes of prior 24hr.     Other pertinent lab: none    Total time spent with patient: 28 [address] discussed with: Patient, Nursing Staff, Consultant/Specialist and >50% of time spent in counseling and coordination of care    Discussed:  Care Plan    Prophylaxis:  H2B/PPI    Disposition:  Home w/Family           ___________________________________________________    Attending Physician: Remberto Moreno MD

## 2017-08-27 NOTE — ANESTHESIA PREPROCEDURE EVALUATION
Anesthetic History   No history of anesthetic complications            Review of Systems / Medical History  Patient summary reviewed and pertinent labs reviewed    Pulmonary  Within defined limits                 Neuro/Psych   Within defined limits           Cardiovascular    Hypertension                   GI/Hepatic/Renal               Comments: Chron's, diverticulitis Endo/Other        Anemia    Comments: Blood loss anemia Other Findings              Physical Exam    Airway  Mallampati: II  TM Distance: 4 - 6 cm  Neck ROM: normal range of motion   Mouth opening: Normal     Cardiovascular  Regular rate and rhythm,  S1 and S2 normal,  no murmur, click, rub, or gallop  Rhythm: regular  Rate: normal         Dental  No notable dental hx       Pulmonary  Breath sounds clear to auscultation               Abdominal  GI exam deferred       Other Findings            Anesthetic Plan    ASA: 2              Anesthetic plan and risks discussed with: Patient

## 2017-08-27 NOTE — PROGRESS NOTES
Orthopaedic Progress Note  Post Op day: 7 Days Post-Op    2017 11:02 AM     Patient: Rosalia Evans MRN: [id number]  SSN: [id number]    YOB: 1965  Age: 50 y.o. Sex: female      Admit date:  2017  Date of Surgery:  2017   Procedures:  Procedure(s):  KNEE ARTHROSCOPY INCISION AND DRAINAGE OF LEFT AND RIGHT KNEE  Admitting Physician:  Erin Gomez MD   Surgeon:  Pat Jerry) and Role:     * Erin Gomez MD - Primary    Consulting Physician(s): Treatment Team: Attending Provider: Erin Gomez MD; Nurse Practitioner: Robert dEge NP; Consulting Provider: Autumn Walls NP; Consulting Provider: Erin Gomez MD; Utilization Review: Sivan Cline RN; Consulting Provider: Quinton Parson MD; Utilization Review: Eduardo Holt RN; Care Manager: Janie Bro; Consulting Provider: Janene Hare MD; Consulting Provider: Aishwarya Rodrigues MD    SUBJECTIVE:     Rosalia Evans is a 50 y.o. female is 7 Days Post-Op s/p Procedure(s):  KNEE ARTHROSCOPY INCISION AND DRAINAGE OF LEFT AND RIGHT KNEE with an appropriate level of post-operative pain. No complaints of nausea, vomiting, dizziness, lightheadedness, chest pain, or shortness of breath. OBJECTIVE:       Physical Exam:  General: Alert, cooperative, no distress. Respiratory: Respirations unlabored  Neurological:  Neurovascular exam within normal limits. Motor: + DF/PF. Musculoskeletal: Calves soft, supple, non-tender upon palpation. Dressing/Wound:  Clean, dry and intact. No significant erythema or swelling.       Vital Signs:      Patient Vitals for the past 8 hrs:   BP Temp Pulse Resp SpO2   17 0805 134/80 98.6 °F (37 °C) 83 18 99 %   17 0346 129/81 98 °F (36.7 °C) 83 15 96 %                                          Temp (24hrs), Av.1 °F (36.7 °C), Min:97.7 °F (36.5 °C), Max:98.6 °F (37 °C)      Labs:        Recent Labs      17   0517   HCT  26.0*   HGB  8.6*     Lab Results   Component Value Date/Time    Sodium 140 08/26/2017 05:17 AM    Potassium 3.5 08/26/2017 05:17 AM    Chloride 108 08/26/2017 05:17 AM    CO2 25 08/26/2017 05:17 AM    Glucose 108 08/26/2017 05:17 AM    BUN 8 08/26/2017 05:17 AM    Creatinine 0.69 08/27/2017 03:22 AM    Calcium 8.4 08/26/2017 05:17 AM       PT/OT:        Gait  Base of Support: Widened  Speed/Astrid: Slow, Shuffled  Step Length: Left shortened, Right shortened  Gait Abnormalities: Antalgic, Decreased step clearance, Step to gait  Ambulation - Level of Assistance: Minimal assistance  Distance (ft): 10 Feet (ft)  Assistive Device: Walker, rolling, Gait belt       Patient mobility  Bed Mobility Training  Rolling: Moderate assistance, Assist x1 (LE supported)  Supine to Sit: Minimum assistance  Sit to Supine: Minimum assistance  Transfer Training  Sit to Stand: Moderate assistance  Stand to Sit: Minimum assistance  Bed to Chair: Total assistance      Gait Training  Assistive Device: Walker, rolling, Gait belt  Ambulation - Level of Assistance: Minimal assistance  Distance (ft): 10 Feet (ft)   Weight Bearing Status  Right Side Weight Bearing: As tolerated  Left Side Weight Bearing: As tolerated        ASSESSMENT / PLAN:   Principal Problem:    Knee pain, left (8/19/2017)    Active Problems:    Essential hypertension (1/4/2017)      Crohn's disease of both small and large intestine without complication ([facility] [address] (0/1/5224)      Anemia (8/19/2017)      Debilitated patient (8/19/2017)      Obese (8/19/2017)      UTI (urinary tract infection) (8/19/2017)      Septic arthritis of knee, left (HCC) (8/21/2017)                    Pain Control: Adequate with oral agents and PRN IV narcotics   DVT Prophylaxis: Lovenox daily, SCDs, JAMEEL Hose    Therapy/ Weight bearing: WBAT, R knee pain severe with PT. Wound/ incisional issues: No erythema of bilat. Knees. Increased pain in R knee today. No effusion. Medical concerns: IV out in night. 15hrs without IV abx. PICC line unable to be placed today per Nursing supervisor. Plan for tomorrow. Appreciate Anesthesia placing IJ. Disposition: Rehab with long term IV abx per ID     Procedure: I explained the risk vs. Benefits to pt in regards to aspirating the R knee. Pt. states they understand and give verbal consent for the procedure. Under sterile conditions, I aspirated 4ml Clear, Serous/sang fluid from the R knee, No signs of infection noted. I injected SQ area of the superior lateral aspect of the R knee with 2ml LidocainePt. Tolerated the Procedure well. Bleeding controlled. Synovial fluid sent to lab for cell count, gram stain/culture and crystals. ESR and CRP redrawn. Will eval results to make decision if R knee needs another washout. Cultures still negative, presentation still seems inflammatory and not joint sepsis. NPO after MN    Dr. Rusty Emerson agrees with plan. Signed By:  Stephen Ramos PA-C    [id number] [facility]  Pgr.  [phone]

## 2017-08-27 NOTE — PROGRESS NOTES
Problem: Falls - Risk of  Goal: *Absence of Falls  Document [place] Fall Risk and appropriate interventions in the flowsheet.    Outcome: Progressing Towards Goal  Fall Risk Interventions:  Mobility Interventions: Patient to call before getting OOB           Medication Interventions: Patient to call before getting OOB     Elimination Interventions: Call light in reach     History of Falls Interventions: Bed/chair exit alarm

## 2017-08-28 LAB
ALBUMIN SERPL-MCNC: 1.3 G/DL (ref 3.5–5)
ALBUMIN/GLOB SERPL: 0.4 {RATIO} (ref 1.1–2.2)
ALP SERPL-CCNC: 165 U/L (ref 45–117)
ALT SERPL-CCNC: 10 U/L (ref 12–78)
ANION GAP SERPL CALC-SCNC: 7 MMOL/L (ref 5–15)
AST SERPL-CCNC: 14 U/L (ref 15–37)
BASOPHILS # BLD: 0 K/UL (ref 0–0.1)
BASOPHILS NFR BLD: 0 % (ref 0–1)
BILIRUB SERPL-MCNC: 0.5 MG/DL (ref 0.2–1)
BUN SERPL-MCNC: 7 MG/DL (ref 6–20)
BUN/CREAT SERPL: 9 (ref 12–20)
CALCIUM SERPL-MCNC: 8.1 MG/DL (ref 8.5–10.1)
CHLORIDE SERPL-SCNC: 108 MMOL/L (ref 97–108)
CK SERPL-CCNC: 8 U/L (ref 26–192)
CO2 SERPL-SCNC: 28 MMOL/L (ref 21–32)
CREAT SERPL-MCNC: 0.74 MG/DL (ref 0.55–1.02)
EOSINOPHIL # BLD: 0.1 K/UL (ref 0–0.4)
EOSINOPHIL NFR BLD: 1 % (ref 0–7)
ERYTHROCYTE [DISTWIDTH] IN BLOOD BY AUTOMATED COUNT: 15.3 % (ref 11.5–14.5)
GLOBULIN SER CALC-MCNC: 3.7 G/DL (ref 2–4)
GLUCOSE SERPL-MCNC: 112 MG/DL (ref 65–100)
HCT VFR BLD AUTO: 25.2 % (ref 35–47)
HGB BLD-MCNC: 8.1 G/DL (ref 11.5–16)
LYMPHOCYTES # BLD: 0.7 K/UL (ref 0.8–3.5)
LYMPHOCYTES NFR BLD: 6 % (ref 12–49)
MAGNESIUM SERPL-MCNC: 1.8 MG/DL (ref 1.6–2.4)
MCH RBC QN AUTO: 29.6 PG (ref 26–34)
MCHC RBC AUTO-ENTMCNC: 32.1 G/DL (ref 30–36.5)
MCV RBC AUTO: 92 FL (ref 80–99)
METAMYELOCYTES NFR BLD MANUAL: 3 %
MONOCYTES # BLD: 0.3 K/UL (ref 0–1)
MONOCYTES NFR BLD: 3 % (ref 5–13)
MYELOCYTES NFR BLD MANUAL: 2 %
NEUTS BAND NFR BLD MANUAL: 11 % (ref 0–6)
NEUTS SEG # BLD: 9.4 K/UL (ref 1.8–8)
NEUTS SEG NFR BLD: 74 % (ref 32–75)
NRBC # BLD: 0 K/UL (ref 0–0.01)
NRBC BLD-RTO: 0 PER 100 WBC
PHOSPHATE SERPL-MCNC: 3.9 MG/DL (ref 2.6–4.7)
PLATELET # BLD AUTO: 350 K/UL (ref 150–400)
POTASSIUM SERPL-SCNC: 3.1 MMOL/L (ref 3.5–5.1)
PROT SERPL-MCNC: 5 G/DL (ref 6.4–8.2)
RBC # BLD AUTO: 2.74 M/UL (ref 3.8–5.2)
RBC MORPH BLD: ABNORMAL
SODIUM SERPL-SCNC: 143 MMOL/L (ref 136–145)
WBC # BLD AUTO: 11 K/UL (ref 3.6–11)

## 2017-08-28 PROCEDURE — 82550 ASSAY OF CK (CPK): CPT | Performed by: PHYSICIAN ASSISTANT

## 2017-08-28 PROCEDURE — 97110 THERAPEUTIC EXERCISES: CPT

## 2017-08-28 PROCEDURE — 74011250637 HC RX REV CODE- 250/637: Performed by: INTERNAL MEDICINE

## 2017-08-28 PROCEDURE — 74011250637 HC RX REV CODE- 250/637: Performed by: NURSE PRACTITIONER

## 2017-08-28 PROCEDURE — 97535 SELF CARE MNGMENT TRAINING: CPT

## 2017-08-28 PROCEDURE — 65270000029 HC RM PRIVATE

## 2017-08-28 PROCEDURE — 85025 COMPLETE CBC W/AUTO DIFF WBC: CPT | Performed by: PHYSICIAN ASSISTANT

## 2017-08-28 PROCEDURE — 84100 ASSAY OF PHOSPHORUS: CPT | Performed by: PHYSICIAN ASSISTANT

## 2017-08-28 PROCEDURE — 74011250636 HC RX REV CODE- 250/636: Performed by: INTERNAL MEDICINE

## 2017-08-28 PROCEDURE — 36415 COLL VENOUS BLD VENIPUNCTURE: CPT | Performed by: PHYSICIAN ASSISTANT

## 2017-08-28 PROCEDURE — 74011000250 HC RX REV CODE- 250: Performed by: PHYSICIAN ASSISTANT

## 2017-08-28 PROCEDURE — 83735 ASSAY OF MAGNESIUM: CPT | Performed by: PHYSICIAN ASSISTANT

## 2017-08-28 PROCEDURE — 76450000000

## 2017-08-28 PROCEDURE — 74011250637 HC RX REV CODE- 250/637: Performed by: PHYSICIAN ASSISTANT

## 2017-08-28 PROCEDURE — 74011000258 HC RX REV CODE- 258: Performed by: INTERNAL MEDICINE

## 2017-08-28 PROCEDURE — 74011000258 HC RX REV CODE- 258: Performed by: PHYSICIAN ASSISTANT

## 2017-08-28 PROCEDURE — 74011250637 HC RX REV CODE- 250/637: Performed by: FAMILY MEDICINE

## 2017-08-28 PROCEDURE — 74011250636 HC RX REV CODE- 250/636: Performed by: PHYSICIAN ASSISTANT

## 2017-08-28 PROCEDURE — 80053 COMPREHEN METABOLIC PANEL: CPT | Performed by: PHYSICIAN ASSISTANT

## 2017-08-28 PROCEDURE — 74011250636 HC RX REV CODE- 250/636: Performed by: FAMILY MEDICINE

## 2017-08-28 PROCEDURE — 97116 GAIT TRAINING THERAPY: CPT

## 2017-08-28 RX ORDER — METAXALONE 800 MG/1
800 TABLET ORAL
Status: DISCONTINUED | OUTPATIENT
Start: 2017-08-28 | End: 2017-08-30 | Stop reason: HOSPADM

## 2017-08-28 RX ORDER — OXYCODONE HYDROCHLORIDE 5 MG/1
2.5 TABLET ORAL
Status: DISCONTINUED | OUTPATIENT
Start: 2017-08-28 | End: 2017-08-29

## 2017-08-28 RX ORDER — OXYCODONE HYDROCHLORIDE 5 MG/1
5 TABLET ORAL
Status: DISCONTINUED | OUTPATIENT
Start: 2017-08-28 | End: 2017-08-30 | Stop reason: HOSPADM

## 2017-08-28 RX ORDER — ACETAMINOPHEN 325 MG/1
650 TABLET ORAL EVERY 4 HOURS
Status: DISCONTINUED | OUTPATIENT
Start: 2017-08-28 | End: 2017-08-30 | Stop reason: HOSPADM

## 2017-08-28 RX ORDER — POTASSIUM CHLORIDE 7.45 MG/ML
10 INJECTION INTRAVENOUS
Status: COMPLETED | OUTPATIENT
Start: 2017-08-28 | End: 2017-08-29

## 2017-08-28 RX ADMIN — NYSTATIN 500000 UNITS: 100000 SUSPENSION ORAL at 13:45

## 2017-08-28 RX ADMIN — POTASSIUM CHLORIDE 10 MEQ: 10 INJECTION, SOLUTION INTRAVENOUS at 11:02

## 2017-08-28 RX ADMIN — LOSARTAN POTASSIUM 25 MG: 50 TABLET, FILM COATED ORAL at 08:37

## 2017-08-28 RX ADMIN — MESALAMINE 800 MG: 400 CAPSULE, DELAYED RELEASE ORAL at 15:24

## 2017-08-28 RX ADMIN — IRON SUCROSE 100 MG: 20 INJECTION, SOLUTION INTRAVENOUS at 10:17

## 2017-08-28 RX ADMIN — DOCUSATE SODIUM 100 MG: 100 CAPSULE ORAL at 17:12

## 2017-08-28 RX ADMIN — POTASSIUM CHLORIDE 10 MEQ: 10 INJECTION, SOLUTION INTRAVENOUS at 12:00

## 2017-08-28 RX ADMIN — Medication 10 ML: at 13:44

## 2017-08-28 RX ADMIN — NAPROXEN 500 MG: 250 TABLET ORAL at 16:00

## 2017-08-28 RX ADMIN — ACETAMINOPHEN 650 MG: 325 TABLET ORAL at 15:24

## 2017-08-28 RX ADMIN — MEROPENEM 500 MG: 500 INJECTION, POWDER, FOR SOLUTION INTRAVENOUS at 21:09

## 2017-08-28 RX ADMIN — OXYCODONE HYDROCHLORIDE AND ACETAMINOPHEN 1 TABLET: 5; 325 TABLET ORAL at 11:14

## 2017-08-28 RX ADMIN — NAPROXEN 500 MG: 250 TABLET ORAL at 08:37

## 2017-08-28 RX ADMIN — Medication 10 ML: at 06:38

## 2017-08-28 RX ADMIN — AZATHIOPRINE 100 MG: 50 TABLET ORAL at 08:37

## 2017-08-28 RX ADMIN — MESALAMINE 800 MG: 400 CAPSULE, DELAYED RELEASE ORAL at 21:09

## 2017-08-28 RX ADMIN — OXYCODONE HYDROCHLORIDE AND ACETAMINOPHEN 1 TABLET: 5; 325 TABLET ORAL at 06:38

## 2017-08-28 RX ADMIN — NYSTATIN 500000 UNITS: 100000 SUSPENSION ORAL at 21:09

## 2017-08-28 RX ADMIN — FLUCONAZOLE 100 MG: 100 TABLET ORAL at 08:37

## 2017-08-28 RX ADMIN — MEROPENEM 500 MG: 500 INJECTION, POWDER, FOR SOLUTION INTRAVENOUS at 02:45

## 2017-08-28 RX ADMIN — METRONIDAZOLE 500 MG: 250 TABLET, FILM COATED ORAL at 13:45

## 2017-08-28 RX ADMIN — POTASSIUM CHLORIDE 10 MEQ: 10 INJECTION, SOLUTION INTRAVENOUS at 11:03

## 2017-08-28 RX ADMIN — MEROPENEM 500 MG: 500 INJECTION, POWDER, FOR SOLUTION INTRAVENOUS at 13:47

## 2017-08-28 RX ADMIN — Medication 10 ML: at 21:10

## 2017-08-28 RX ADMIN — METRONIDAZOLE 500 MG: 250 TABLET, FILM COATED ORAL at 21:08

## 2017-08-28 RX ADMIN — OXYCODONE HYDROCHLORIDE 5 MG: 5 TABLET ORAL at 21:09

## 2017-08-28 RX ADMIN — OXYCODONE HYDROCHLORIDE 5 MG: 5 TABLET ORAL at 15:25

## 2017-08-28 RX ADMIN — ACETAMINOPHEN 650 MG: 325 TABLET ORAL at 21:09

## 2017-08-28 RX ADMIN — POTASSIUM CHLORIDE 10 MEQ: 10 INJECTION, SOLUTION INTRAVENOUS at 13:43

## 2017-08-28 RX ADMIN — NYSTATIN 500000 UNITS: 100000 SUSPENSION ORAL at 17:11

## 2017-08-28 RX ADMIN — Medication 10 ML: at 06:39

## 2017-08-28 RX ADMIN — OXYCODONE HYDROCHLORIDE AND ACETAMINOPHEN 1 TABLET: 5; 325 TABLET ORAL at 02:45

## 2017-08-28 RX ADMIN — MESALAMINE 800 MG: 400 CAPSULE, DELAYED RELEASE ORAL at 08:37

## 2017-08-28 RX ADMIN — DAPTOMYCIN 600 MG: 500 INJECTION, POWDER, LYOPHILIZED, FOR SOLUTION INTRAVENOUS at 17:19

## 2017-08-28 RX ADMIN — METAXALONE 800 MG: 800 TABLET ORAL at 11:14

## 2017-08-28 RX ADMIN — MEROPENEM 500 MG: 500 INJECTION, POWDER, FOR SOLUTION INTRAVENOUS at 08:37

## 2017-08-28 RX ADMIN — METRONIDAZOLE 500 MG: 250 TABLET, FILM COATED ORAL at 06:38

## 2017-08-28 RX ADMIN — NYSTATIN 500000 UNITS: 100000 SUSPENSION ORAL at 08:37

## 2017-08-28 NOTE — PROGRESS NOTES
Problem: Self Care Deficits Care Plan (Adult)  Goal: *Acute Goals and Plan of Care (Insert Text)  Occupational Therapy Goals  Initiated 8/21/2017; Reviewed 8/28/17 and all goals remain appropriate to be met within 7 days  1. Patient will perform grooming and sponge bathing standing at the sink with supervision/set-up within 7 day(s). 2. Patient will perform lower body dressing with supervision/set-up within 7 day(s). 3. Patient will perform toilet transfer with supervision/set-up and best adaptive equipment within 7 day(s). 4. Patient will perform all aspects of toileting with modified independence within 7 day(s). 5. Patient will participate in upper extremity therapeutic exercise/activities with independence for 10 minutes within 7 day(s). 6. Patient will stand for functional activity > or = 5 minutes with < or = 5/10 pain per patient report within 7 day(7). OCCUPATIONAL THERAPY TREATMENT: WEEKLY REASSESSMENT  Patient: Bart Gaytan (39 y.o. female)  Date: 8/28/2017  Diagnosis: Knee pain, left  LEFT KNEE INFECTION  Septic arthritis of knee, left (HCC) Knee pain, left  Procedure(s) (LRB):  KNEE ARTHROSCOPY INCISION AND DRAINAGE OF LEFT AND RIGHT KNEE (Left) 8 Days Post-Op  Precautions: Fall, WBAT      ASSESSMENT:  Patient seen for weekly reassessment, all goals reviewed and remain appropriate. Pt with improved activity tolerance today, agreeable to further functional mobility and being up in chair. Pt received seated EOB with PT, wanting to use the toilet in restroom rather than BSC. Pt stood with mod A x2 to RW with bed elevated, and ambulated into bathroom with min-mod A. Performed toilet transfer to raised seat with mod A, cues to reach back for toilet armrests and hold grabbar rather than walker. Pt required min A for toilet hygiene, able to perform in sitting but required A in standing for thoroughness.  Pt performed seated hand washing with wipe with setup, then stood with mod A x2 and ambulated to chair in room. Pt transferred to chair with mod A and tactile cueing, seat height elevated with pillow. Pillows placed beneath pt's heels to slightly elevate LEs for comfort. Pt is progressing slowly but steadily towards her goals. As she was independent at baseline, recommend rehab upon discharge to improve functional performance. Acute OT will continue to see pt 5x/week. Recommend introduction to LB AE next session. Progression toward goals:  [ ]            Improving appropriately and progressing toward goals  [X]            Improving slowly and progressing toward goals  [ ]            Not making progress toward goals and plan of care will be adjusted       PLAN:  Goals have been updated based on progression since last assessment. Patient continues to benefit from skilled intervention to address the above impairments. Continue to follow patient 5 times a week to address goals. Planned Interventions:  [X]                    Self Care Training                  [X]             Therapeutic Activities  [X]                    Functional Mobility Training    [ ]             Cognitive Retraining  [X]                    Therapeutic Exercises           [X]             Endurance Activities  [X]                    Balance Training                   [ ]             Neuromuscular Re-Education  [ ]                    Visual/Perceptual Training     [X]        Home Safety Training  [X]                    Patient Education                 [X]             Family Training/Education  [ ]                    Other (comment):  Discharge Recommendations: Rehab  Further Equipment Recommendations for Discharge: TBD at rehab       SUBJECTIVE:   Patient stated I'd like to go sit on the toilet.       OBJECTIVE DATA SUMMARY:   Cognitive/Behavioral Status:  Neurologic State: Alert  Orientation Level: Oriented X4  Cognition: Follows commands  Perception: Appears intact  Perseveration: No perseveration noted  Safety/Judgement: Awareness of environment;Home safety; Fall prevention     Functional Mobility and Transfers for ADLs:  Bed Mobility:  Supine to Sit: Additional time;Modified independent     Transfers:  Sit to Stand: Assist x2; Moderate assistance  Functional Transfers  Toilet Transfer : Moderate assistance     Balance:  Sitting: Intact  Standing: Intact; With support  Standing - Static: Good  Standing - Dynamic : Fair     ADL Intervention:  Feeding  Feeding Assistance: Independent     Grooming  Grooming Assistance: Supervision/set up (in sitting)  Washing Hands: Supervision/set-up        Toileting  Bowel Hygiene: Minimum assistance     Cognitive Retraining  Safety/Judgement: Awareness of environment;Home safety; Fall prevention     Pain:  Pain Scale 1: Numeric (0 - 10)  Pain Intensity 1: 5  Pain Location 1: Knee  Pain Orientation 1: Right;Left  Pain Description 1: Aching;Tightness  Pain Intervention(s) 1: Medication (see MAR)  Activity Tolerance:   Good  Please refer to the flowsheet for vital signs taken during this treatment.   After treatment:   [X] Patient left in no apparent distress sitting up in chair  [ ] Patient left in no apparent distress in bed  [X] Call bell left within reach  [X] Nursing notified  [ ] Caregiver present   [ ] Bed alarm activated      COMMUNICATION/COLLABORATION:   The patients plan of care was discussed with: Physical Therapist and Registered Nurse     ERIK Cardoso  Time Calculation: 23 mins

## 2017-08-28 NOTE — CONSULTS
Palliative Medicine Consult  [place]: [phone] ([phone])    Patient Name: Asmita Hong  YOB: 1965    Date of Initial Consult: 8/28/17  Reason for Consult: Overwhelming symptoms  Requesting Provider: Dr. Joann Velazquez   Primary Care Physician: Manan Dueñas MD      SUMMARY:   Asmita Hong is a 50 y.o. with a past history of Crohns, who was admitted on 8/19/2017 from home with a diagnosis of left septic knee. Current medical issues leading to Palliative Medicine involvement include: Overwhelming symptoms/pain management. Chart reviewed/HPI-patient with crohns disease and had been off medications for almost 1 year until symptoms started back in June 2017. Has been back on imuran and asacol. Had colonoscopy august 14 with the following results Ileum is normal  Entire colon has variable sized and depth ulceration with purulent exudate  Sigmoid colon also has diverticulosis with edema, submucosal hemorrhage and purulent exudate from the diverticulosis in this area. Fistula seen at anus  Anorectal sessile polyp    During first hospitalization in August, patient with severe right knee pain-had steroid injection and felt much better. After discharge, patient returned with severe left knee pain and now appears to have b/l septic arthritis of left knee. Patient never had this issue before in her knees and never been on chronic opioids     reviewed-no medications noted from the last year       PALLIATIVE DIAGNOSES:   1. B/L knee pain  2. Septic arthritis  3. Crohns disease  4. Debility       PLAN:   1. Met with patient. She states the percocet does provide some relief of her pain but she becomes so sedated, she then not able to participate in therapy. She was just using tylenol for pain during last admission. 2. Start Tylenol 650 mg every 4 hours scheduled  3. Oxycodone 2.5 mg every 4 hours prn mild pain, 5 mg every 4 hours prn moderate pain. She has morphine 2 mg IV prn severe pain  4.  Will monitor her needs and change her opioid if sedation remains  5. Patient already on NSAID with naprosyn 500 mg bid  6. No issues with constipation secondary to her Crohns  7. Patient moved her from [place] 1 year ago for a job. Lives with her cousin and friend. Works for [employer] and they have supportive in her situation. Her mom still lives in [place] and sister in [place]  8. Initial consult note routed to primary continuity provider  9. Communicated plan of care with: Palliative IDT       GOALS OF CARE / TREATMENT PREFERENCES:   [====Goals of Care====]  GOALS OF CARE:  Patient / health care proxy stated goals: better pain control so she can go home      TREATMENT PREFERENCES:   Code Status: Full Code    Advance Care Planning:  Advance Care Planning 8/23/2017   Patient's Healthcare Decision Maker is: Legal Next of Kin   Confirm Advance Directive Yes, not on file       Other:    The palliative care team has discussed with patient / health care proxy about goals of care / treatment preferences for patient.  [====Goals of Care====]         HISTORY:     History obtained from: chart, patient    CHIEF COMPLAINT: knee pain    HPI/SUBJECTIVE:    The patient is:   [x] Verbal and participatory  [] Non-participatory due to:   Patient states pain after percocet will lower from 8 to 4 at times.  Worse with ambulation     Clinical Pain Assessment (nonverbal scale for severity on nonverbal patients):   [++++ Clinical Pain Assessment++++]  [++++Pain Severity++++]: Pain: 8  [++++Pain Character++++]: throbbing  [++++Pain Duration++++]: days  [++++Pain Effect++++]: limits walking  [++++Pain Factors++++]: worse with ambulation  [++++Pain Frequency++++]: intermittent  [++++Pain Location++++]: both knees  [++++ Clinical Pain Assessment++++]  Duration: for how long has pt been experiencing pain (e.g., 2 days, 1 month, years)  Frequency: how often pain is an issue (e.g., several times per day, once every few days, constant)     FUNCTIONAL ASSESSMENT: Palliative Performance Scale (PPS):  PPS: 80       PSYCHOSOCIAL/SPIRITUAL SCREENING:     Advance Care Planning:  Advance Care Planning 8/23/2017   Patient's Healthcare Decision Maker is: Legal Next of Kin   Confirm Advance Directive Yes, not on file        Any spiritual / Jew concerns:  [] Yes /  [x] No    Caregiver Burnout:  [] Yes /  [] No /  [x] No Caregiver Present      Anticipatory grief assessment:   [x] Normal  / [] Maladaptive       ESAS Anxiety: Anxiety: 1    ESAS Depression: Depression: 0        REVIEW OF SYSTEMS:     Positive and pertinent negative findings in ROS are noted above in HPI. The following systems were [x] reviewed / [] unable to be reviewed as noted in HPI  Other findings are noted below. Systems: constitutional, ears/nose/mouth/throat, respiratory, gastrointestinal, genitourinary, musculoskeletal, integumentary, neurologic, psychiatric, endocrine. Positive findings noted below. Modified ESAS Completed by: provider   Fatigue: 3 Drowsiness: 5   Depression: 0 Pain: 8   Anxiety: 1 Nausea: 0   Anorexia: 2 Dyspnea: 0     Constipation: No     Stool Occurrence(s): 1        PHYSICAL EXAM:     From RN flowsheet:  Wt Readings from Last 3 Encounters:   08/19/17 210 lb (95.3 kg)   08/12/17 210 lb (95.3 kg)   08/11/17 212 lb (96.2 kg)     Blood pressure 152/79, pulse 92, temperature 97.6 °F (36.4 °C), resp. rate 19, height 5' 6\" (1.676 m), weight 210 lb (95.3 kg), last menstrual period 07/12/2017, SpO2 98 %, not currently breastfeeding.     Pain Scale 1: Numeric (0 - 10)  Pain Intensity 1: 6  Pain Onset 1: acute  Pain Location 1: Knee  Pain Orientation 1: Right  Pain Description 1: Aching  Pain Intervention(s) 1: Medication (see MAR)  Last bowel movement, if known:     Constitutional: alert and oriented, appears comfortable  Eyes: pupils equal, anicteric  ENMT: no nasal discharge, moist mucous membranes  Cardiovascular: regular rhythm, distal pulses intact  Respiratory: breathing not labored, symmetric  Gastrointestinal: soft slight diffuse ttp  Musculoskeletal: no deformity, no tenderness to palpation  Skin: warm, dry  Neurologic: following commands, moving all extremities  Psychiatric: full affect, no hallucinations  Other:       HISTORY:     Principal Problem:    Knee pain, left (8/19/2017)    Active Problems:    Essential hypertension (1/4/2017)      Crohn's disease of both small and large intestine without complication ([address] (9/8/9801)      Anemia (8/19/2017)      Debilitated patient (8/19/2017)      Obese (8/19/2017)      UTI (urinary tract infection) (8/19/2017)      Septic arthritis of knee, left ([address] (8/21/2017)      Past Medical History:   Diagnosis Date    [facility]     Hypertension     Ill-defined condition     history of  ulcerative colitis      Past Surgical History:   Procedure Laterality Date    COLONOSCOPY N/A 8/14/2017    COLONOSCOPY performed by Caren Chavarria MD at [facility] HX APPENDECTOMY      1993    HX CHOLECYSTECTOMY N/A     HX TONSIL AND ADENOIDECTOMY Bilateral     HX TONSIL AND ADENOIDECTOMY      HX WISDOM TEETH EXTRACTION        History reviewed. No pertinent family history. History reviewed, no pertinent family history.   Social History   Substance Use Topics    Smoking status: Never Smoker    Smokeless tobacco: Never Used      Comment: some smoking in college    Alcohol use 1.8 oz/week     3 Glasses of wine per week     Allergies   Allergen Reactions    Codeine Nausea and Vomiting    Sulfa (Sulfonamide Antibiotics) Other (comments)     Makes face flush      Current Facility-Administered Medications   Medication Dose Route Frequency    iron sucrose (VENOFER) 100 mg in 0.9% sodium chloride 100 mL IVPB  100 mg IntraVENous Q24H    metaxalone (SKELAXIN) tablet 800 mg  800 mg Oral TID PRN    acetaminophen (TYLENOL) tablet 650 mg  650 mg Oral Q4H    oxyCODONE IR (ROXICODONE) tablet 2.5 mg  2.5 mg Oral Q4H PRN    oxyCODONE IR (ROXICODONE) tablet 5 mg  5 mg Oral Q4H PRN    DAPTOmycin (CUBICIN) 600 mg in sterile water (preservative free) 12 mL IV syringe RF formulation  600 mg IntraVENous Q24H    meropenem (MERREM) 500 mg in 0.9% sodium chloride (MBP/ADV) 50 mL  500 mg IntraVENous Q6H    metroNIDAZOLE (FLAGYL) tablet 500 mg  500 mg Oral TID    nystatin (MYCOSTATIN) 100,000 unit/mL oral suspension 500,000 Units  500,000 Units Oral QID    fluconazole (DIFLUCAN) tablet 100 mg  100 mg Oral DAILY    azaTHIOprine (IMURAN) tablet 100 mg  100 mg Oral DAILY AFTER BREAKFAST    mesalamine (DELZICOL) DR capsule 800 mg  800 mg Oral TID    0.9% sodium chloride infusion 250 mL  250 mL IntraVENous PRN    losartan (COZAAR) tablet 25 mg  25 mg Oral DAILY    diphenhydrAMINE (BENADRYL) injection 12.5 mg  12.5 mg IntraVENous Q4H PRN    morphine injection 2 mg  2 mg IntraVENous Q4H PRN    ondansetron (ZOFRAN) injection 4 mg  4 mg IntraVENous Q4H PRN    sodium chloride (NS) flush 5-10 mL  5-10 mL IntraVENous Q8H    sodium chloride (NS) flush 5-10 mL  5-10 mL IntraVENous Q8H    sodium chloride (NS) flush 5-10 mL  5-10 mL IntraVENous PRN    naloxone (NARCAN) injection 0.4 mg  0.4 mg IntraVENous PRN    diphenhydrAMINE (BENADRYL) capsule 25 mg  25 mg Oral Q4H PRN    ondansetron (ZOFRAN ODT) tablet 4 mg  4 mg Oral Q4H PRN    docusate sodium (COLACE) capsule 100 mg  100 mg Oral BID    naproxen (NAPROSYN) tablet 500 mg  500 mg Oral BID WITH MEALS          LAB AND IMAGING FINDINGS:     Lab Results   Component Value Date/Time    WBC 11.0 08/28/2017 02:50 AM    HGB 8.1 08/28/2017 02:50 AM    PLATELET 958 27/30/8452 02:50 AM     Lab Results   Component Value Date/Time    Sodium 143 08/28/2017 02:50 AM    Potassium 3.1 08/28/2017 02:50 AM    Chloride 108 08/28/2017 02:50 AM    CO2 28 08/28/2017 02:50 AM    BUN 7 08/28/2017 02:50 AM    Creatinine 0.74 08/28/2017 02:50 AM    Calcium 8.1 08/28/2017 02:50 AM    Magnesium 1.8 08/28/2017 02:50 AM    Phosphorus 3.9 08/28/2017 02:50 AM      Lab Results   Component Value Date/Time    AST (SGOT) 14 08/28/2017 02:50 AM    Alk. phosphatase 165 08/28/2017 02:50 AM    Protein, total 5.0 08/28/2017 02:50 AM    Albumin 1.3 08/28/2017 02:50 AM    Globulin 3.7 08/28/2017 02:50 AM     Lab Results   Component Value Date/Time    INR 1.1 08/11/2017 05:32 PM    Prothrombin time 11.1 08/11/2017 05:32 PM      Lab Results   Component Value Date/Time    Iron 12 08/21/2017 10:42 AM    TIBC 109 08/21/2017 10:42 AM    Iron % saturation 11 08/21/2017 10:42 AM    Ferritin 575 08/21/2017 10:42 AM      No results found for: PH, PCO2, PO2  No components found for: [place]   Lab Results   Component Value Date/Time    CK 8 08/28/2017 02:50 AM    CK - MB <1.0 08/11/2017 05:32 PM                Total time: 50  Counseling / coordination time, spent as noted above:   > 50% counseling / coordination?:     Prolonged service was provided for  []30 min   []75 min in face to face time in the presence of the patient, spent as noted above. Time Start:   Time End:   Note: this can only be billed with 62235 (initial) or 20126 (follow up). If multiple start / stop times, list each separately.

## 2017-08-28 NOTE — PROGRESS NOTES
VAT Note- To acknowledge order for PICC placement for long term antibiotics. Chart reviewed for PICC placement evaluation. Areas reviewed include, but are not limited to, patient's current and past H&P, Lab and Procedure Results, all notes, media records and medications. Pt currently with TLC. Will plan to place PICC when definitive discharge plan is completed. Will visit patient.

## 2017-08-28 NOTE — ROUTINE PROCESS
Orthopedic & Surgical Nursing Communication Tool      7:39 AM  8/28/2017     Bedside and Verbal shift change report given to Harlan Chowdary RN (incoming nurse) by Angel Nice RN (outgoing nurse) on Rosalia Cristina a 50 y.o. female who was admitted on 8/19/2017 10:14 AM. Report included the following information SBAR, Kardex and Recent Results. Opportunity for questions and clarifications were given to the incoming nurse. Patient's bed is in low position, side rails x2, door open PRN, call bell within reach and patient not in distress.     Angel Nice RN

## 2017-08-28 NOTE — PROGRESS NOTES
Bedside and Verbal shift change report given to Eliceo Feliz RN (oncoming nurse) by Delmy Dukes RN (offgoing nurse). Report included the following information SBAR, Kardex, Procedure Summary, Intake/Output, MAR and Recent Results. Pt had soft, non-watery BM this morning. Per discussion with Dr. Veronica Ruelas. Diff order discontinued and contact precautions removed. Pt educated on the change and on-coming RN aware of the rationale for the discontinuation.

## 2017-08-28 NOTE — PROGRESS NOTES
8/28/2017 3:39 PM Called and lvm with admissions at [address] [place] [address] again and also lvm with admissions director's cell phone. Spoke with pt regarding [facility] acceptance. Pt reported [address] [place] [address] is preference and would like to wait for their response prior to deciding. CM will follow up.     8/28/2017 1:43 PM Updated PT and Ortho PA progress notes sent to [facility] and [address] [place] [address]. [facility] has accepted pt. Awaiting response from [address] [place] [address]. 8/28/2017  1:14 PM Called and lvms with both admission departments with [address] [place] [address] and [facility]. CM will follow up.     8/28/2017 9:26 AM Met with pt to discuss rehab placement. Pt was understanding [facility] has denied and SNF will be needed. List of SNFs reviewed with pt, choice given, [address] [place] [address] and [facility] selected. Referrals sent to both facilities via [employer]. CM will follow up.  JEOVANY De Anda

## 2017-08-28 NOTE — PROGRESS NOTES
[facility] [place] [address]  [address] [place] [address], [address] [place] [address]  [phone]      Medical Progress Note      NAME: Venancio Ratliff   :  1965  MRM:  [id number]    Date/Time: 2017  10:29 AM       Assessment and Plan:     Septic arthritis of knee, left / Knee pain, left / Debilitated patient- Tolerated surgery. Ortho is attending to pain control, DVT prophylaxis and rehab decisions as usual.  ID is managing Abx. Patient tolerated surgery without complications. ID recommending extended course of invanz and dapto. She is currently on vancomycin, flagyl and ceftriaxone. Ortho did tap knee again , with improved counts, and they have no further surgical plans. Mobilize with PT/OT per ortho instructions, she is not making much progress. Needs SNF, and is medically ready. Anemia - Acute on chronic with iron deficiency. Hb down earlier this week with acute blood loss, but improved with transfusion. He has iron deficiency in the setting of chronic inflammation from IBD. Needs iron, and once central line present could consider IV forms for now.     Essential hypertension - BP stable on losartan     Thrush - Continue Nystatin, she may be passing fluconazole tablet without absorption    Crohn's disease of both small and large intestine without complication / Diarrhea - GI is managing this issue. They re-started her azathioprine and mesalamine. I have consulted GI to ensure they see her every day, since her GI issues, diarrhea, anorexia are all limited her recovery and discharge. No more abd pain. C diff ordered, but stool never sent. Per nursing, stool may be partially formed, in which case C diff is ruled out clinically. I see she is on Flagyl, which would cover C diff anyway. I do not think she has C diff, but will send stool for testing if stool is purely liquid.     Obese - counseled on weight loss      UTI (urinary tract infection) - Enterococcus on urine culture sens to vancomycin. Antibiotics at discharge per ID           Subjective:     Chief Complaint:  Throat pain better. Knee pain worse. Diarrhea persists    ROS:  (bold if positive, if negative)    DiarrheaNausea  Tolerating minimal PT Tolerating minimal Diet        Objective:     Last 24hrs VS reviewed since prior progress note. Most recent are:    Visit Vitals    /81 (BP 1 Location: Left arm, BP Patient Position: At rest;Head of bed elevated (Comment degrees))    Pulse 82    Temp 97.4 °F (36.3 °C)    Resp 18    Ht 5' 6\" (1.676 m)    Wt 95.3 kg (210 lb)    SpO2 96%    Breastfeeding No    BMI 33.89 kg/m2     SpO2 Readings from Last 6 Encounters:   08/28/17 96%   08/17/17 93%   08/11/17 98%   08/03/17 98%   01/04/17 96%    O2 Flow Rate (L/min): 2 l/min       Intake/Output Summary (Last 24 hours) at 08/28/17 0853  Last data filed at 08/28/17 1247   Gross per 24 hour   Intake                0 ml   Output              500 ml   Net             -500 ml        Physical Exam:    Gen:  Obese, frail, ill-appearing, in no acute distress  HEENT:  Pink conjunctivae, PERRL, hearing intact to voice, thrush on mucous membranes  Neck:  Supple, without masses, thyroid non-tender  Resp:  No accessory muscle use, clear breath sounds without wheezes rales or rhonchi  Card:  No murmurs, tachycardic S1, S2 without thrills, bruits or peripheral edema  Abd:  Soft, non-tender, mildly distended, normoactive bowel sounds are present, no mass  Lymph:  No cervical or inguinal adenopathy  Musc:  No cyanosis or clubbing  Skin:  No rashes or ulcers, skin turgor is reduced  Neuro:  Cranial nerves are grossly intact, general motor weakness, follows commands appropriately  Psych: Moderate insight, oriented to person, place and time, alert, anxious and depressed.     Telemetry reviewed:   normal sinus rhythm  __________________________________________________________________  Medications Reviewed: (see below)  Medications:     Current Facility-Administered Medications   Medication Dose Route Frequency    meropenem (MERREM) 500 mg in 0.9% sodium chloride (MBP/ADV) 50 mL  500 mg IntraVENous Q6H    DAPTOmycin (CUBICIN) 600 mg in water, bacteriostatic (WATER) 12 mL IV syringe RF formulation  600 mg IntraVENous Q24H    metroNIDAZOLE (FLAGYL) tablet 500 mg  500 mg Oral TID    nystatin (MYCOSTATIN) 100,000 unit/mL oral suspension 500,000 Units  500,000 Units Oral QID    fluconazole (DIFLUCAN) tablet 100 mg  100 mg Oral DAILY    azaTHIOprine (IMURAN) tablet 100 mg  100 mg Oral DAILY AFTER BREAKFAST    mesalamine (DELZICOL) DR capsule 800 mg  800 mg Oral TID    0.9% sodium chloride infusion 250 mL  250 mL IntraVENous PRN    losartan (COZAAR) tablet 25 mg  25 mg Oral DAILY    acetaminophen (TYLENOL) tablet 650 mg  650 mg Oral Q4H PRN    diphenhydrAMINE (BENADRYL) injection 12.5 mg  12.5 mg IntraVENous Q4H PRN    morphine injection 2 mg  2 mg IntraVENous Q4H PRN    ondansetron (ZOFRAN) injection 4 mg  4 mg IntraVENous Q4H PRN    sodium chloride (NS) flush 5-10 mL  5-10 mL IntraVENous Q8H    sodium chloride (NS) flush 5-10 mL  5-10 mL IntraVENous Q8H    sodium chloride (NS) flush 5-10 mL  5-10 mL IntraVENous PRN    oxyCODONE-acetaminophen (PERCOCET) 5-325 mg per tablet 1 Tab  1 Tab Oral Q4H PRN    naloxone (NARCAN) injection 0.4 mg  0.4 mg IntraVENous PRN    diphenhydrAMINE (BENADRYL) capsule 25 mg  25 mg Oral Q4H PRN    ondansetron (ZOFRAN ODT) tablet 4 mg  4 mg Oral Q4H PRN    docusate sodium (COLACE) capsule 100 mg  100 mg Oral BID    naproxen (NAPROSYN) tablet 500 mg  500 mg Oral BID WITH MEALS        Lab Data Reviewed: (see below)  Lab Review:     Recent Labs      08/28/17   0250  08/27/17   1125  08/26/17   0517   WBC  11.0  11.1*  10.7   HGB  8.1*  8.1*  8.6*   HCT  25.2*  25.4*  26.0*   PLT  350  308  338     Recent Labs      08/28/17   0250  08/27/17   1125  08/27/17   0322  08/26/17   0517   NA  143  143   --   140   K  3.1* 3.0*   --   3.5   CL  108  108   --   108   CO2  28  28   --   25   GLU  112*  141*   --   108*   BUN  7  8   --   8   CREA  0.74  0.74  0.69  0.82   CA  8.1*  7.9*   --   8.4*   MG  1.8   --    --   1.9   PHOS  3.9   --    --   3.3   ALB  1.3*  1.3*   --   1.4*   TBILI  0.5  0.4   --   0.5   SGOT  14*  15   --   16   ALT  10*  12   --   12     No results found for: GLUCPOC  No results for input(s): PH, PCO2, PO2, HCO3, FIO2 in the last 72 hours. No results for input(s): INR in the last 72 hours. No lab exists for component: INREXT, INREXT  All [facility] Results     Procedure Component Value Units Date/Time    C. DIFFICILE (DNA) [[id number]]     Order Status:  Sent     CULTURE, BODY FLUID Cornelius Stephenson [[id number]] Collected:  08/27/17 1105    Order Status:  Completed Specimen:  Synovial Fluid Updated:  08/27/17 1449     Special Requests: NO SPECIAL REQUESTS        GRAM STAIN NO WBC'S SEEN         NO ORGANISMS SEEN        Culture result:         Culture performed on Fluid swab specimen    C. DIFFICILE (DNA) [[id number]]     Order Status:  Canceled     C. DIFFICILE (DNA) [[id number]] Collected:  08/26/17 1000    Order Status:  Canceled     C. DIFFICILE (DNA) [[id number]]     Order Status:  Canceled     CULTURE, URINE [[id number]]  (Abnormal)  (Susceptibility) Collected:  08/19/17 1108    Order Status:  Completed Specimen:  Urine from Clean catch Updated:  08/22/17 1117     Special Requests: NO SPECIAL REQUESTS        [place] Count --        >100,000  COLONIES/mL       Culture result:         ENTEROCOCCUS FAECIUM GROUP D (A)    CULTURE, ANAEROBIC [[id number]] Collected:  08/20/17 0915    Order Status:  Completed Specimen:  Surgical Specimen Updated:  08/22/17 0909     Special Requests: --        RIGHT  KNEE  ASPIRATE       Culture result:         No growth thus far, holding 14 days.     CULTURE, BODY FLUID Cornelius Stephenson [[id number]] Collected:  08/20/17 0915    Order Status:  Completed Specimen:  Surgical Specimen Updated: 08/22/17 0907     Special Requests: --        RIGHT  KNEE  ASPIRATE       GRAM STAIN 4+ WBCS SEEN         NO ORGANISMS SEEN                 Culture performed on Unspun Fluid     Culture result:         No growth thus far, holding 14 days. CULTURE, BODY FLUID Rachael Cruz [[id number]] Collected:  08/19/17 1230    Order Status:  Completed Specimen:  Knee Fluid Updated:  08/21/17 0911     Special Requests: NO SPECIAL REQUESTS        GRAM STAIN         PLEASE REFER TO STAT GRAM STAIN     Culture result:         Culture performed on Unspun Fluid              No growth thus far, holding 14 days. CULTURE, ANAEROBIC [[id number]] Collected:  08/19/17 1230    Order Status:  Completed Specimen:  Knee  Updated:  08/21/17 0910     Special Requests: NO SPECIAL REQUESTS        Culture result:         No growth thus far, holding 14 days. Maria M Muir [[id number]] Collected:  08/19/17 1230    Order Status:  Completed Specimen:  Knee Fluid Updated:  08/19/17 1820     Special Requests: NO SPECIAL REQUESTS        GRAM STAIN 3+ WBCS SEEN         NO ORGANISMS SEEN       CULTURE, URINE [[id number]]     Order Status:  Canceled Specimen:  Urine from Clean catch           I have reviewed notes of prior 24hr.     Other pertinent lab: none    Total time spent with patient: 28 [address] discussed with: Patient, Nursing Staff, Consultant/Specialist and >50% of time spent in counseling and coordination of care    Discussed:  Care Plan    Prophylaxis:  H2B/PPI    Disposition:  Home w/Family           ___________________________________________________    Attending Physician: Chandana Hoyt MD

## 2017-08-28 NOTE — PROGRESS NOTES
Orthopaedic Progress Note  Post Op day: 8 Days Post-Op    August 28, 2017 10:05 AM     Patient: Marcy Herrera MRN: [id number]  SSN: [id number]    YOB: 1965  Age: 50 y.o. Sex: female      Admit date:  8/19/2017  Date of Surgery:  8/20/2017   Procedures:  Procedure(s):  KNEE ARTHROSCOPY INCISION AND DRAINAGE OF LEFT AND RIGHT KNEE  Admitting Physician:  Nathan العراقي MD   Surgeon:  Jailene Su) and Role:     * Nathan العراقي MD - Primary    Consulting Physician(s): Treatment Team: Attending Provider: Nathan العراقي MD; Nurse Practitioner: Syed James NP; Consulting Provider: Muriel Ruiz NP; Consulting Provider: Nathan العراقي MD; Utilization Review: Vito Mcgregor RN; Consulting Provider: Ricardo Guevara MD; Utilization Review: Lavell Sandifer, RN; Care Manager: Zaid Mayfield; Consulting Provider: Brandon Gilmore MD; Consulting Provider: Mathew Duarte MD; Consulting Provider: Radha Goodwin MD    SUBJECTIVE:     Marcy Herrera is a 50 y.o. female is 8 Days Post-Op s/p Procedure(s):  KNEE ARTHROSCOPY INCISION AND DRAINAGE OF LEFT AND RIGHT KNEE with an appropriate level of post-operative pain. No complaints of nausea, vomiting, dizziness, lightheadedness, chest pain, or shortness of breath. OBJECTIVE:       Physical Exam:  General: Alert, cooperative, no distress. Respiratory: Respirations unlabored  Neurological:  Neurovascular exam within normal limits. Motor: + DF/PF. Musculoskeletal: Calves soft, supple, non-tender upon palpation. Left and right knee without erythema. She has moderate effusions of bilateral knees. NVI distally. Dressing/Wound:  Clean, dry and intact. No significant erythema or swelling.       Vital Signs:      Patient Vitals for the past 8 hrs:   BP Temp Pulse Resp SpO2   08/28/17 0718 138/81 97.4 °F (36.3 °C) 82 18 96 %   08/28/17 0246 136/79 97 °F (36.1 °C) 85 18 97 %                                          Temp (24hrs), Av.6 °F (36.4 °C), Min:97 °F (36.1 °C), Max:98 °F (36.7 °C)      Labs:        Recent Labs      17   0250   HCT  25.2*   HGB  8.1*     Lab Results   Component Value Date/Time    Sodium 143 2017 02:50 AM    Potassium 3.1 2017 02:50 AM    Chloride 108 2017 02:50 AM    CO2 28 2017 02:50 AM    Glucose 112 2017 02:50 AM    BUN 7 2017 02:50 AM    Creatinine 0.74 2017 02:50 AM    Calcium 8.1 2017 02:50 AM       PT/OT:        Gait  Base of Support: Widened  Speed/Astrid: Slow, Shuffled  Step Length: Left shortened, Right shortened  Gait Abnormalities: Antalgic, Decreased step clearance, Step to gait  Ambulation - Level of Assistance: Minimal assistance  Distance (ft): 10 Feet (ft)  Assistive Device: Walker, rolling, Gait belt       Patient mobility  Bed Mobility Training  Rolling: Moderate assistance, Assist x1 (LE supported)  Supine to Sit: Minimum assistance  Sit to Supine: Minimum assistance  Transfer Training  Sit to Stand: Moderate assistance  Stand to Sit: Minimum assistance  Bed to Chair: Total assistance      Gait Training  Assistive Device: Walker, rolling, Gait belt  Ambulation - Level of Assistance: Minimal assistance  Distance (ft): 10 Feet (ft)   Weight Bearing Status  Right Side Weight Bearing: As tolerated  Left Side Weight Bearing: As tolerated        ASSESSMENT / PLAN:   Principal Problem:    Knee pain, left (2017)    Active Problems:    Essential hypertension (2017)      Crohn's disease of both small and large intestine without complication ([address] (2620)      Anemia (2017)      Debilitated patient (2017)      Obese (2017)      UTI (urinary tract infection) (2017)      Septic arthritis of knee, left ([address] (2017)         A: BL knee septic arthritis              Pain Control: Percocet and will add in MR   DVT Prophylaxis: Mechanical DVT ppx   Hemodynamics: Stable at 8.1   Activity: WBAT on BL LE.      Disposition: SNF placement- will likely be DC tomorrow. ? C-Diff testing will need results. Signed By:  Rosio Ansari, [address] [place] [address]  Pgr.  [phone]

## 2017-08-28 NOTE — PROGRESS NOTES
Problem: Mobility Impaired (Adult and Pediatric)  Goal: *Acute Goals and Plan of Care (Insert Text)  Physical Therapy Goals  Initiated 8/21/2017Weekly Reassessment 8/28/17 (all goals ongoing)  1. Patient will move from supine to sit and sit to supine in bed with independence within 7 day(s). 2. Patient will transfer from bed to chair and chair to bed with modified independence using the least restrictive device within 7 day(s). 3. Patient will perform sit to stand with modified independence within 7 day(s). 4. Patient will ambulate with modified independence for 150 feet with the least restrictive device within 7 day(s). 5. Patient will ascend/descend 4 stairs with 1 handrail(s) with modified independence within 7 day(s). 6. Patient will be independent with HEP for knees within 7 days. PHYSICAL THERAPY TREATMENT: WEEKLY REASSESSMENT  Patient: Cele Buck (26 y.o. female)  Date: 8/28/2017  Diagnosis: Knee pain, left  LEFT KNEE INFECTION  Septic arthritis of knee, left (HCC) Knee pain, left  Procedure(s) (LRB):  KNEE ARTHROSCOPY INCISION AND DRAINAGE OF LEFT AND RIGHT KNEE (Left) 8 Days Post-Op  Precautions: Fall, WBAT      ASSESSMENT:  Patient seen for weekly reassessment. Received in bed alert and willing to work with PT. She was pre-medicated for pain and rates pain 0/10 at rest in her knees but 4/10 in her belly. Patient had aspiration to right knee 8/27 and has bandaids in tact to both knees. Patient was able to perform all bed exercises, then stood and ambulated 20 feet with rolling walker to bathroom and back,  +2 mod assist to stand but min assist to ambulate. Patient left up in chair with pillow support to BLE. ROM remains very limited to bilateral knees (see measurements below). All goals ongoing. Patient will need rehab. Patient's progression toward goals since last assessment: all goals ongoing. Slow progress.         PLAN:  Goals have been updated based on progression since last assessment. Patient continues to benefit from skilled intervention to address the above impairments. Continue to follow the patient 6 times a week to address goals. Planned Interventions:  [X]              Bed Mobility Training             [ ]       Neuromuscular Re-Education  [X]              Transfer Training                   [ ]       Orthotic/Prosthetic Training  [X]              Gait Training                         [ ]       Modalities  [X]              Therapeutic Exercises           [ ]       Edema Management/Control  [ ]              Therapeutic Activities            [ ]       Patient and Family Training/Education  [ ]              Other (comment):  Discharge Recommendations: Rehab  Further Equipment Recommendations for Discharge: none       SUBJECTIVE:   Patient stated I would like to go sit on the real commode like a real person.       OBJECTIVE DATA SUMMARY:   Critical Behavior:  Neurologic State: Alert  Orientation Level: Oriented X4  Cognition: Appropriate decision making, Appropriate for age attention/concentration, Appropriate safety awareness, Follows commands  Safety/Judgement: Awareness of environment, Fall prevention, Home safety     Strength:       able to do SLR to BLE with CGA or less                    Functional Mobility Training:  Bed Mobility:     Supine to Sit: Additional time;Modified independent              Transfers:  Sit to Stand: Assist x2; Moderate assistance  Stand to Sit: Assist x2;Minimum assistance        Bed to Chair: Assist x2;Minimum assistance                    Balance:  Sitting: Intact  Standing: Intact; With support  Ambulation/Gait Training:  Distance (ft): 20 Feet (ft)  Assistive Device: Gait belt;Walker, rolling  Ambulation - Level of Assistance: Minimal assistance        Gait Abnormalities: Step to gait; Decreased step clearance  Right Side Weight Bearing: As tolerated  Left Side Weight Bearing: As tolerated  Base of Support: Widened     Speed/Astrid: [place] decreased (<100 feet/min)  Step Length: Right shortened;Left shortened                                           Therapeutic Exercises: Ankle pumps, quad sets, heel slides, SAQ, SLR  Pain:  Pain Scale 1: Numeric (0 - 10)  Pain Intensity 1: 5  Pain Location 1: Knee  Pain Orientation 1: Right;Left  Activity Tolerance:   Limited by increased pain with ROM  Please refer to the flowsheet for vital signs taken during this treatment.   After treatment:   [X]  Patient left in no apparent distress sitting up in chair  [X]  Patient left in no apparent distress in bed  [X]  Call bell left within reach  [ ]  Nursing notified  [ ]  Caregiver present  [ ]  Bed alarm activated      COMMUNICATION/COLLABORATION:   The patients plan of care was discussed with: Occupational Therapist and Registered Nurse     Riya Liu PT   Time Calculation: 30 mins

## 2017-08-28 NOTE — PROGRESS NOTES
Gastrointestinal Progress Note    8/28/2017    Admit Date: 8/19/2017    Subjective: \"I have no appetite\"     New Complaints Today: Denies abdominal pain at this time. States she has decreased appetite, partly due to abnormal oral sensation. Endorse feeling of swelling without difficulty in swallowing. States she feels tired with chewing. Denies nausea/vomitng. Still having diarrhea. Patient is also describing green Vaginal discharge with some BM. Denies vaginal irritation, bleeding, or pain. Discussed with nursing, C. Diff testing not completed.     Current Facility-Administered Medications   Medication Dose Route Frequency    meropenem (MERREM) 500 mg in 0.9% sodium chloride (MBP/ADV) 50 mL  500 mg IntraVENous Q6H    DAPTOmycin (CUBICIN) 600 mg in water, bacteriostatic (WATER) 12 mL IV syringe RF formulation  600 mg IntraVENous Q24H    metroNIDAZOLE (FLAGYL) tablet 500 mg  500 mg Oral TID    nystatin (MYCOSTATIN) 100,000 unit/mL oral suspension 500,000 Units  500,000 Units Oral QID    fluconazole (DIFLUCAN) tablet 100 mg  100 mg Oral DAILY    azaTHIOprine (IMURAN) tablet 100 mg  100 mg Oral DAILY AFTER BREAKFAST    mesalamine (DELZICOL) DR capsule 800 mg  800 mg Oral TID    0.9% sodium chloride infusion 250 mL  250 mL IntraVENous PRN    losartan (COZAAR) tablet 25 mg  25 mg Oral DAILY    acetaminophen (TYLENOL) tablet 650 mg  650 mg Oral Q4H PRN    diphenhydrAMINE (BENADRYL) injection 12.5 mg  12.5 mg IntraVENous Q4H PRN    morphine injection 2 mg  2 mg IntraVENous Q4H PRN    ondansetron (ZOFRAN) injection 4 mg  4 mg IntraVENous Q4H PRN    sodium chloride (NS) flush 5-10 mL  5-10 mL IntraVENous Q8H    sodium chloride (NS) flush 5-10 mL  5-10 mL IntraVENous Q8H    sodium chloride (NS) flush 5-10 mL  5-10 mL IntraVENous PRN    oxyCODONE-acetaminophen (PERCOCET) 5-325 mg per tablet 1 Tab  1 Tab Oral Q4H PRN    naloxone (NARCAN) injection 0.4 mg  0.4 mg IntraVENous PRN    diphenhydrAMINE (BENADRYL) capsule 25 mg  25 mg Oral Q4H PRN    ondansetron (ZOFRAN ODT) tablet 4 mg  4 mg Oral Q4H PRN    docusate sodium (COLACE) capsule 100 mg  100 mg Oral BID    naproxen (NAPROSYN) tablet 500 mg  500 mg Oral BID WITH MEALS        Objective:     Blood pressure 138/81, pulse 82, temperature 97.4 °F (36.3 °C), resp. rate 18, height 5' 6\" (1.676 m), weight 210 lb (95.3 kg), last menstrual period 07/12/2017, SpO2 96 %, not currently breastfeeding. 08/28 0701 - 08/28 1900  In: -   Out: 500 [Urine:500]    08/26 1901 - 08/28 0700  In: 50 [I.V.:50]  Out: 1 [Urine:1]    EXAM:  GENERAL: alert, cooperative, no distress, Oral candida HEART: regular rate and rhythm, S1, S2 normal, no murmur, click, rub or gallop, LUNGS: chest clear, no wheezing, rales, normal symmetric air entry, ABDOMEN: bowel sounds present, soft, nondistended, nontender. EXTREMITY: No lower extremity edema, sensation intact. Limited bilateral knee flexion.     Data Review    Recent Results (from the past 24 hour(s))   CELL COUNT, BODY FLUID    Collection Time: 08/27/17 11:05 AM   Result Value Ref Range    BODY FLUID TYPE SYNOVIAL FLUID      FLUID COLOR RED      FLUID APPEARANCE BLOODY      FLUID RBC COUNT >100 /cu mm    FLUID WBC COUNT 4130 (H) 0 - 5 /cu mm    FLD NEUTROPHILS 78 %    FLD LYMPHS 14 %    FLD MONO/MACROPHAGE 8 %   CRYSTALS, SYNOVIAL FLUID    Collection Time: 08/27/17 11:05 AM   Result Value Ref Range    FLUID TYPE(7) SYNOVIAL FLUID      Crystals, body fluid NO CRYSTALS SEEN WITH POLARIZED LIGHT     CULTURE, BODY FLUID W GRAM STAIN    Collection Time: 08/27/17 11:05 AM   Result Value Ref Range    Special Requests: NO SPECIAL REQUESTS      GRAM STAIN NO WBC'S SEEN      GRAM STAIN NO ORGANISMS SEEN      Culture result: Culture performed on Fluid swab specimen     CBC WITH AUTOMATED DIFF    Collection Time: 08/27/17 11:25 AM   Result Value Ref Range    WBC 11.1 (H) 3.6 - 11.0 K/uL    RBC 2.74 (L) 3.80 - 5.20 M/uL    HGB 8.1 (L) 11.5 - 16.0 g/dL    HCT 25.4 (L) 35.0 - 47.0 %    MCV 92.7 80.0 - 99.0 FL    MCH 29.6 26.0 - 34.0 PG    MCHC 31.9 30.0 - 36.5 g/dL    RDW 15.3 (H) 11.5 - 14.5 %    PLATELET 768 466 - 036 K/uL    NEUTROPHILS 80 (H) 32 - 75 %    BAND NEUTROPHILS 4 0 - 6 %    LYMPHOCYTES 4 (L) 12 - 49 %    MONOCYTES 6 5 - 13 %    EOSINOPHILS 0 0 - 7 %    BASOPHILS 0 0 - 1 %    METAMYELOCYTES 4 (H) 0 %    MYELOCYTES 2 (H) 0 %    ABS. NEUTROPHILS 9.3 (H) 1.8 - 8.0 K/UL    ABS. LYMPHOCYTES 0.4 (L) 0.8 - 3.5 K/UL    ABS. MONOCYTES 0.7 0.0 - 1.0 K/UL    ABS. EOSINOPHILS 0.0 0.0 - 0.4 K/UL    ABS. BASOPHILS 0.0 0.0 - 0.1 K/UL    DF MANUAL      RBC COMMENTS POLYCHROMASIA  1+        WBC COMMENTS TOXIC GRANULATION     METABOLIC PANEL, COMPREHENSIVE    Collection Time: 08/27/17 11:25 AM   Result Value Ref Range    Sodium 143 136 - 145 mmol/L    Potassium 3.0 (L) 3.5 - 5.1 mmol/L    Chloride 108 97 - 108 mmol/L    CO2 28 21 - 32 mmol/L    Anion gap 7 5 - 15 mmol/L    Glucose 141 (H) 65 - 100 mg/dL    BUN 8 6 - 20 MG/DL    Creatinine 0.74 0.55 - 1.02 MG/DL    BUN/Creatinine ratio 11 (L) 12 - 20      GFR est AA >60 >60 ml/min/1.73m2    GFR est non-AA >60 >60 ml/min/1.73m2    Calcium 7.9 (L) 8.5 - 10.1 MG/DL    Bilirubin, total 0.4 0.2 - 1.0 MG/DL    ALT (SGPT) 12 12 - 78 U/L    AST (SGOT) 15 15 - 37 U/L    Alk.  phosphatase 180 (H) 45 - 117 U/L    Protein, total 5.0 (L) 6.4 - 8.2 g/dL    Albumin 1.3 (L) 3.5 - 5.0 g/dL    Globulin 3.7 2.0 - 4.0 g/dL    A-G Ratio 0.4 (L) 1.1 - 2.2     C REACTIVE PROTEIN, QT    Collection Time: 08/27/17 11:25 AM   Result Value Ref Range    C-Reactive protein 23.90 (H) <0.60 mg/dL   SED RATE (ESR)    Collection Time: 08/27/17 11:25 AM   Result Value Ref Range    Sed rate, automated 127 (H) 0 - 30 mm/hr   NUCLEATED RBC    Collection Time: 08/27/17 11:25 AM   Result Value Ref Range    NRBC 0.0 0  WBC    ABSOLUTE NRBC 0.00 0.00 - 0.01 K/uL   CBC WITH AUTOMATED DIFF    Collection Time: 08/28/17  2:50 AM   Result Value Ref Range WBC 11.0 3.6 - 11.0 K/uL    RBC 2.74 (L) 3.80 - 5.20 M/uL    HGB 8.1 (L) 11.5 - 16.0 g/dL    HCT 25.2 (L) 35.0 - 47.0 %    MCV 92.0 80.0 - 99.0 FL    MCH 29.6 26.0 - 34.0 PG    MCHC 32.1 30.0 - 36.5 g/dL    RDW 15.3 (H) 11.5 - 14.5 %    PLATELET 425 851 - 592 K/uL    NEUTROPHILS 74 32 - 75 %    BAND NEUTROPHILS 11 (H) 0 - 6 %    LYMPHOCYTES 6 (L) 12 - 49 %    MONOCYTES 3 (L) 5 - 13 %    EOSINOPHILS 1 0 - 7 %    BASOPHILS 0 0 - 1 %    METAMYELOCYTES 3 (H) 0 %    MYELOCYTES 2 (H) 0 %    ABS. NEUTROPHILS 9.4 (H) 1.8 - 8.0 K/UL    ABS. LYMPHOCYTES 0.7 (L) 0.8 - 3.5 K/UL    ABS. MONOCYTES 0.3 0.0 - 1.0 K/UL    ABS. EOSINOPHILS 0.1 0.0 - 0.4 K/UL    ABS. BASOPHILS 0.0 0.0 - 0.1 K/UL    RBC COMMENTS ANISOCYTOSIS  1+       METABOLIC PANEL, COMPREHENSIVE    Collection Time: 08/28/17  2:50 AM   Result Value Ref Range    Sodium 143 136 - 145 mmol/L    Potassium 3.1 (L) 3.5 - 5.1 mmol/L    Chloride 108 97 - 108 mmol/L    CO2 28 21 - 32 mmol/L    Anion gap 7 5 - 15 mmol/L    Glucose 112 (H) 65 - 100 mg/dL    BUN 7 6 - 20 MG/DL    Creatinine 0.74 0.55 - 1.02 MG/DL    BUN/Creatinine ratio 9 (L) 12 - 20      GFR est AA >60 >60 ml/min/1.73m2    GFR est non-AA >60 >60 ml/min/1.73m2    Calcium 8.1 (L) 8.5 - 10.1 MG/DL    Bilirubin, total 0.5 0.2 - 1.0 MG/DL    ALT (SGPT) 10 (L) 12 - 78 U/L    AST (SGOT) 14 (L) 15 - 37 U/L    Alk.  phosphatase 165 (H) 45 - 117 U/L    Protein, total 5.0 (L) 6.4 - 8.2 g/dL    Albumin 1.3 (L) 3.5 - 5.0 g/dL    Globulin 3.7 2.0 - 4.0 g/dL    A-G Ratio 0.4 (L) 1.1 - 2.2     CK    Collection Time: 08/28/17  2:50 AM   Result Value Ref Range    CK 8 (L) 26 - 192 U/L   MAGNESIUM    Collection Time: 08/28/17  2:50 AM   Result Value Ref Range    Magnesium 1.8 1.6 - 2.4 mg/dL   PHOSPHORUS    Collection Time: 08/28/17  2:50 AM   Result Value Ref Range    Phosphorus 3.9 2.6 - 4.7 MG/DL   NUCLEATED RBC    Collection Time: 08/28/17  2:50 AM   Result Value Ref Range    NRBC 0.0 0  WBC    ABSOLUTE NRBC 0.00 0.00 - 0.01 K/uL         Assessment:     Principal Problem:    Knee pain, left (8/19/2017)    Active Problems:    Essential hypertension (1/4/2017)      Crohn's disease of both small and large intestine without complication ([facility] (5/2/9340)      Anemia (8/19/2017)      Debilitated patient (8/19/2017)      Obese (8/19/2017)      UTI (urinary tract infection) (8/19/2017)      Septic arthritis of knee, left ([facility] (8/21/2017)        Plan:     1. R/O C diff, nursing aware  2. Continue Oral Diflucan ordered for oral thrush  3. Encourage PO  4. Antibiotic regimen as per infectious disease  5. Continue Azathioprine 100 mg daily and Mesalamine 800 mg TID  6. As to vaginal discharge continue to monitor at this time. Cronh's and diverticulitis potential source for fistula, but given CT abdomen negative x 2 and intermittent nature, monitoring for now. 7. Ortho following     Alisa Fletcher MD  08/28/17  12:15PM    Patient discussed with Attending physician, Dr. Marisa Walters to follow.     I have interviewed and examined patient with addendum to note above and formulation care plan    Joao Da Silva M.D.

## 2017-08-28 NOTE — PROGRESS NOTES
Bedside and Verbal shift change report given to Shiv Al (oncoming nurse) by Nalini Baez RN (offgoing nurse). Report included the following information SBAR, Kardex, Procedure Summary, Intake/Output, MAR, Accordion and Recent Results.

## 2017-08-29 ENCOUNTER — APPOINTMENT (OUTPATIENT)
Dept: GENERAL RADIOLOGY | Age: 52
DRG: 488 | End: 2017-08-29
Attending: INTERNAL MEDICINE
Payer: COMMERCIAL

## 2017-08-29 ENCOUNTER — APPOINTMENT (OUTPATIENT)
Dept: GENERAL RADIOLOGY | Age: 52
DRG: 488 | End: 2017-08-29
Attending: PHYSICIAN ASSISTANT
Payer: COMMERCIAL

## 2017-08-29 LAB
ALBUMIN SERPL-MCNC: 1.4 G/DL (ref 3.5–5)
ALBUMIN/GLOB SERPL: 0.4 {RATIO} (ref 1.1–2.2)
ALP SERPL-CCNC: 158 U/L (ref 45–117)
ALT SERPL-CCNC: 11 U/L (ref 12–78)
ANION GAP SERPL CALC-SCNC: 7 MMOL/L (ref 5–15)
AST SERPL-CCNC: 14 U/L (ref 15–37)
BASOPHILS # BLD: 0.1 K/UL (ref 0–0.1)
BASOPHILS NFR BLD: 1 % (ref 0–1)
BILIRUB SERPL-MCNC: 0.5 MG/DL (ref 0.2–1)
BUN SERPL-MCNC: 9 MG/DL (ref 6–20)
BUN/CREAT SERPL: 10 (ref 12–20)
CALCIUM SERPL-MCNC: 8.2 MG/DL (ref 8.5–10.1)
CHLORIDE SERPL-SCNC: 106 MMOL/L (ref 97–108)
CO2 SERPL-SCNC: 27 MMOL/L (ref 21–32)
CREAT SERPL-MCNC: 0.86 MG/DL (ref 0.55–1.02)
DIFFERENTIAL METHOD BLD: ABNORMAL
EOSINOPHIL # BLD: 0.1 K/UL (ref 0–0.4)
EOSINOPHIL NFR BLD: 1 % (ref 0–7)
ERYTHROCYTE [DISTWIDTH] IN BLOOD BY AUTOMATED COUNT: 15.4 % (ref 11.5–14.5)
GLOBULIN SER CALC-MCNC: 4 G/DL (ref 2–4)
GLUCOSE SERPL-MCNC: 123 MG/DL (ref 65–100)
HCT VFR BLD AUTO: 26.6 % (ref 35–47)
HGB BLD-MCNC: 8.3 G/DL (ref 11.5–16)
LYMPHOCYTES # BLD: 1 K/UL (ref 0.8–3.5)
LYMPHOCYTES NFR BLD: 8 % (ref 12–49)
MCH RBC QN AUTO: 29.1 PG (ref 26–34)
MCHC RBC AUTO-ENTMCNC: 31.2 G/DL (ref 30–36.5)
MCV RBC AUTO: 93.3 FL (ref 80–99)
METAMYELOCYTES NFR BLD MANUAL: 3 %
MONOCYTES # BLD: 1 K/UL (ref 0–1)
MONOCYTES NFR BLD: 8 % (ref 5–13)
NEUTS BAND NFR BLD MANUAL: 6 % (ref 0–6)
NEUTS SEG # BLD: 10.3 K/UL (ref 1.8–8)
NEUTS SEG NFR BLD: 73 % (ref 32–75)
PLATELET # BLD AUTO: 362 K/UL (ref 150–400)
POTASSIUM SERPL-SCNC: 3.5 MMOL/L (ref 3.5–5.1)
PROT SERPL-MCNC: 5.4 G/DL (ref 6.4–8.2)
RBC # BLD AUTO: 2.85 M/UL (ref 3.8–5.2)
RBC MORPH BLD: ABNORMAL
SODIUM SERPL-SCNC: 140 MMOL/L (ref 136–145)
WBC # BLD AUTO: 13.1 K/UL (ref 3.6–11)
WBC MORPH BLD: ABNORMAL

## 2017-08-29 PROCEDURE — 77030018786 HC NDL GD F/USND BARD -B

## 2017-08-29 PROCEDURE — 74011000258 HC RX REV CODE- 258: Performed by: PHYSICIAN ASSISTANT

## 2017-08-29 PROCEDURE — 74011000250 HC RX REV CODE- 250: Performed by: PHYSICIAN ASSISTANT

## 2017-08-29 PROCEDURE — 85025 COMPLETE CBC W/AUTO DIFF WBC: CPT | Performed by: PHYSICIAN ASSISTANT

## 2017-08-29 PROCEDURE — 80053 COMPREHEN METABOLIC PANEL: CPT | Performed by: PHYSICIAN ASSISTANT

## 2017-08-29 PROCEDURE — 74011250636 HC RX REV CODE- 250/636: Performed by: INTERNAL MEDICINE

## 2017-08-29 PROCEDURE — 74011250637 HC RX REV CODE- 250/637: Performed by: INTERNAL MEDICINE

## 2017-08-29 PROCEDURE — 74011250636 HC RX REV CODE- 250/636: Performed by: PHYSICIAN ASSISTANT

## 2017-08-29 PROCEDURE — C1751 CATH, INF, PER/CENT/MIDLINE: HCPCS

## 2017-08-29 PROCEDURE — 74011250637 HC RX REV CODE- 250/637: Performed by: NURSE PRACTITIONER

## 2017-08-29 PROCEDURE — 76937 US GUIDE VASCULAR ACCESS: CPT

## 2017-08-29 PROCEDURE — 97116 GAIT TRAINING THERAPY: CPT

## 2017-08-29 PROCEDURE — 65270000029 HC RM PRIVATE

## 2017-08-29 PROCEDURE — 74011250637 HC RX REV CODE- 250/637: Performed by: FAMILY MEDICINE

## 2017-08-29 PROCEDURE — 36415 COLL VENOUS BLD VENIPUNCTURE: CPT | Performed by: PHYSICIAN ASSISTANT

## 2017-08-29 PROCEDURE — 02HV33Z INSERTION OF INFUSION DEVICE INTO SUPERIOR VENA CAVA, PERCUTANEOUS APPROACH: ICD-10-PCS | Performed by: ORTHOPAEDIC SURGERY

## 2017-08-29 PROCEDURE — 97110 THERAPEUTIC EXERCISES: CPT

## 2017-08-29 PROCEDURE — 74011000258 HC RX REV CODE- 258: Performed by: INTERNAL MEDICINE

## 2017-08-29 PROCEDURE — 77030018719 HC DRSG PTCH ANTIMIC J&J -A

## 2017-08-29 PROCEDURE — 36569 INSJ PICC 5 YR+ W/O IMAGING: CPT | Performed by: INTERNAL MEDICINE

## 2017-08-29 RX ORDER — GABAPENTIN 100 MG/1
100 CAPSULE ORAL 3 TIMES DAILY
Status: DISCONTINUED | OUTPATIENT
Start: 2017-08-29 | End: 2017-08-30 | Stop reason: HOSPADM

## 2017-08-29 RX ORDER — SODIUM CHLORIDE 0.9 % (FLUSH) 0.9 %
20 SYRINGE (ML) INJECTION EVERY 24 HOURS
Status: DISCONTINUED | OUTPATIENT
Start: 2017-08-29 | End: 2017-08-30 | Stop reason: HOSPADM

## 2017-08-29 RX ORDER — SODIUM CHLORIDE 0.9 % (FLUSH) 0.9 %
10-30 SYRINGE (ML) INJECTION AS NEEDED
Status: DISCONTINUED | OUTPATIENT
Start: 2017-08-29 | End: 2017-08-30 | Stop reason: HOSPADM

## 2017-08-29 RX ORDER — DEXAMETHASONE SODIUM PHOSPHATE 4 MG/ML
4 INJECTION, SOLUTION INTRA-ARTICULAR; INTRALESIONAL; INTRAMUSCULAR; INTRAVENOUS; SOFT TISSUE EVERY 8 HOURS
Status: DISCONTINUED | OUTPATIENT
Start: 2017-08-29 | End: 2017-08-30 | Stop reason: HOSPADM

## 2017-08-29 RX ORDER — SODIUM CHLORIDE 0.9 % (FLUSH) 0.9 %
10-40 SYRINGE (ML) INJECTION EVERY 8 HOURS
Status: DISCONTINUED | OUTPATIENT
Start: 2017-08-29 | End: 2017-08-30 | Stop reason: HOSPADM

## 2017-08-29 RX ORDER — AZATHIOPRINE 50 MG/1
150 TABLET ORAL
Status: DISCONTINUED | OUTPATIENT
Start: 2017-08-29 | End: 2017-08-30 | Stop reason: HOSPADM

## 2017-08-29 RX ORDER — SODIUM CHLORIDE 0.9 % (FLUSH) 0.9 %
10 SYRINGE (ML) INJECTION AS NEEDED
Status: DISCONTINUED | OUTPATIENT
Start: 2017-08-29 | End: 2017-08-30 | Stop reason: HOSPADM

## 2017-08-29 RX ORDER — SODIUM CHLORIDE 0.9 % (FLUSH) 0.9 %
10 SYRINGE (ML) INJECTION EVERY 24 HOURS
Status: DISCONTINUED | OUTPATIENT
Start: 2017-08-29 | End: 2017-08-30 | Stop reason: HOSPADM

## 2017-08-29 RX ORDER — OXYCODONE HYDROCHLORIDE 5 MG/1
2.5 TABLET ORAL EVERY 4 HOURS
Status: DISCONTINUED | OUTPATIENT
Start: 2017-08-29 | End: 2017-08-30 | Stop reason: HOSPADM

## 2017-08-29 RX ORDER — POLYETHYLENE GLYCOL 3350 17 G/17G
17 POWDER, FOR SOLUTION ORAL DAILY
Status: DISCONTINUED | OUTPATIENT
Start: 2017-08-29 | End: 2017-08-30 | Stop reason: HOSPADM

## 2017-08-29 RX ADMIN — Medication 20 ML: at 14:51

## 2017-08-29 RX ADMIN — ACETAMINOPHEN 650 MG: 325 TABLET ORAL at 04:49

## 2017-08-29 RX ADMIN — Medication 10 ML: at 14:00

## 2017-08-29 RX ADMIN — METRONIDAZOLE 500 MG: 250 TABLET, FILM COATED ORAL at 04:49

## 2017-08-29 RX ADMIN — OXYCODONE HYDROCHLORIDE 5 MG: 5 TABLET ORAL at 11:35

## 2017-08-29 RX ADMIN — FOLIC ACID-PYRIDOXINE-CYANOCOBALAMIN TAB 2.5-25-2 MG 1 TABLET: 2.5-25-2 TAB at 11:00

## 2017-08-29 RX ADMIN — MESALAMINE 800 MG: 400 CAPSULE, DELAYED RELEASE ORAL at 16:43

## 2017-08-29 RX ADMIN — NAPROXEN 500 MG: 250 TABLET ORAL at 16:43

## 2017-08-29 RX ADMIN — MESALAMINE 800 MG: 400 CAPSULE, DELAYED RELEASE ORAL at 22:31

## 2017-08-29 RX ADMIN — MESALAMINE 800 MG: 400 CAPSULE, DELAYED RELEASE ORAL at 11:00

## 2017-08-29 RX ADMIN — LOSARTAN POTASSIUM 25 MG: 50 TABLET, FILM COATED ORAL at 10:59

## 2017-08-29 RX ADMIN — MEROPENEM 500 MG: 500 INJECTION, POWDER, FOR SOLUTION INTRAVENOUS at 01:01

## 2017-08-29 RX ADMIN — ACETAMINOPHEN 650 MG: 325 TABLET ORAL at 01:02

## 2017-08-29 RX ADMIN — FLUCONAZOLE 100 MG: 100 TABLET ORAL at 11:00

## 2017-08-29 RX ADMIN — NYSTATIN 500000 UNITS: 100000 SUSPENSION ORAL at 22:31

## 2017-08-29 RX ADMIN — NAPROXEN 500 MG: 250 TABLET ORAL at 11:34

## 2017-08-29 RX ADMIN — OXYCODONE HYDROCHLORIDE 2.5 MG: 5 TABLET ORAL at 18:15

## 2017-08-29 RX ADMIN — GABAPENTIN 100 MG: 100 CAPSULE ORAL at 22:31

## 2017-08-29 RX ADMIN — AZATHIOPRINE 150 MG: 50 TABLET ORAL at 11:00

## 2017-08-29 RX ADMIN — OXYCODONE HYDROCHLORIDE 2.5 MG: 5 TABLET ORAL at 22:31

## 2017-08-29 RX ADMIN — DAPTOMYCIN 600 MG: 500 INJECTION, POWDER, LYOPHILIZED, FOR SOLUTION INTRAVENOUS at 18:15

## 2017-08-29 RX ADMIN — GABAPENTIN 100 MG: 100 CAPSULE ORAL at 10:58

## 2017-08-29 RX ADMIN — ACETAMINOPHEN 650 MG: 325 TABLET ORAL at 16:43

## 2017-08-29 RX ADMIN — MEROPENEM 500 MG: 500 INJECTION, POWDER, FOR SOLUTION INTRAVENOUS at 22:31

## 2017-08-29 RX ADMIN — IRON SUCROSE 100 MG: 20 INJECTION, SOLUTION INTRAVENOUS at 11:00

## 2017-08-29 RX ADMIN — ACETAMINOPHEN 650 MG: 325 TABLET ORAL at 22:31

## 2017-08-29 RX ADMIN — NYSTATIN 500000 UNITS: 100000 SUSPENSION ORAL at 13:00

## 2017-08-29 RX ADMIN — Medication 10 ML: at 22:32

## 2017-08-29 RX ADMIN — NYSTATIN 500000 UNITS: 100000 SUSPENSION ORAL at 16:50

## 2017-08-29 RX ADMIN — OXYCODONE HYDROCHLORIDE 5 MG: 5 TABLET ORAL at 04:49

## 2017-08-29 RX ADMIN — GABAPENTIN 100 MG: 100 CAPSULE ORAL at 16:43

## 2017-08-29 RX ADMIN — Medication 10 ML: at 05:24

## 2017-08-29 RX ADMIN — OXYCODONE HYDROCHLORIDE 5 MG: 5 TABLET ORAL at 16:50

## 2017-08-29 RX ADMIN — OXYCODONE HYDROCHLORIDE 5 MG: 5 TABLET ORAL at 01:02

## 2017-08-29 RX ADMIN — ACETAMINOPHEN 650 MG: 325 TABLET ORAL at 12:00

## 2017-08-29 RX ADMIN — MEROPENEM 500 MG: 500 INJECTION, POWDER, FOR SOLUTION INTRAVENOUS at 14:00

## 2017-08-29 RX ADMIN — NYSTATIN 500000 UNITS: 100000 SUSPENSION ORAL at 09:00

## 2017-08-29 NOTE — PROGRESS NOTES
Gastrointestinal Progress Note    8/29/2017    Admit Date: 8/19/2017    Subjective:   Lower extremity pain     New Complaints Today:  No: no abdominal pain; no bowel movements; no rectal bleeding.   Indicating increased oral intake without vomiting    Still unable to stand    Current Facility-Administered Medications   Medication Dose Route Frequency    polyethylene glycol (MIRALAX) packet 17 g  17 g Oral DAILY    azaTHIOprine (IMURAN) tablet 150 mg  150 mg Oral DAILY AFTER BREAKFAST    folic acid-vit S9-TTO T24 (FOLTX) 2.5-25-2 mg tablet 1 Tab  1 Tab Oral DAILY    gabapentin (NEURONTIN) capsule 100 mg  100 mg Oral TID    iron sucrose (VENOFER) 100 mg in 0.9% sodium chloride 100 mL IVPB  100 mg IntraVENous Q24H    metaxalone (SKELAXIN) tablet 800 mg  800 mg Oral TID PRN    acetaminophen (TYLENOL) tablet 650 mg  650 mg Oral Q4H    oxyCODONE IR (ROXICODONE) tablet 2.5 mg  2.5 mg Oral Q4H PRN    oxyCODONE IR (ROXICODONE) tablet 5 mg  5 mg Oral Q4H PRN    DAPTOmycin (CUBICIN) 600 mg in sterile water (preservative free) 12 mL IV syringe RF formulation  600 mg IntraVENous Q24H    meropenem (MERREM) 500 mg in 0.9% sodium chloride (MBP/ADV) 50 mL  500 mg IntraVENous Q6H    metroNIDAZOLE (FLAGYL) tablet 500 mg  500 mg Oral TID    nystatin (MYCOSTATIN) 100,000 unit/mL oral suspension 500,000 Units  500,000 Units Oral QID    fluconazole (DIFLUCAN) tablet 100 mg  100 mg Oral DAILY    mesalamine (DELZICOL) DR capsule 800 mg  800 mg Oral TID    0.9% sodium chloride infusion 250 mL  250 mL IntraVENous PRN    losartan (COZAAR) tablet 25 mg  25 mg Oral DAILY    diphenhydrAMINE (BENADRYL) injection 12.5 mg  12.5 mg IntraVENous Q4H PRN    morphine injection 2 mg  2 mg IntraVENous Q4H PRN    ondansetron (ZOFRAN) injection 4 mg  4 mg IntraVENous Q4H PRN    sodium chloride (NS) flush 5-10 mL  5-10 mL IntraVENous Q8H    sodium chloride (NS) flush 5-10 mL  5-10 mL IntraVENous Q8H    sodium chloride (NS) flush 5-10 mL 5-10 mL IntraVENous PRN    naloxone (NARCAN) injection 0.4 mg  0.4 mg IntraVENous PRN    diphenhydrAMINE (BENADRYL) capsule 25 mg  25 mg Oral Q4H PRN    ondansetron (ZOFRAN ODT) tablet 4 mg  4 mg Oral Q4H PRN    docusate sodium (COLACE) capsule 100 mg  100 mg Oral BID    naproxen (NAPROSYN) tablet 500 mg  500 mg Oral BID WITH MEALS        Objective:     Blood pressure 142/89, pulse 81, temperature 97.9 °F (36.6 °C), resp. rate 12, height 5' 6\" (1.676 m), weight 95.3 kg (210 lb), last menstrual period 07/12/2017, SpO2 98 %, not currently breastfeeding. 08/27 1901 - 08/29 0700  In: -   Out: 500 [Urine:500]    EXAM:  GENERAL: alert, cooperative, no distress, HEART: regular rate and rhythm, LUNGS: chest clear, no wheezing, rales, normal symmetric air entry, Heart exam - S1, S2 normal, no murmur, no gallop, rate regular, ABDOMEN:  Bowel sounds are normal, liver is not enlarged, spleen is not enlarged and EXTREMITY: edema bilateral      Data Review    Recent Results (from the past 24 hour(s))   CBC WITH AUTOMATED DIFF    Collection Time: 08/29/17  5:01 AM   Result Value Ref Range    WBC 13.1 (H) 3.6 - 11.0 K/uL    RBC 2.85 (L) 3.80 - 5.20 M/uL    HGB 8.3 (L) 11.5 - 16.0 g/dL    HCT 26.6 (L) 35.0 - 47.0 %    MCV 93.3 80.0 - 99.0 FL    MCH 29.1 26.0 - 34.0 PG    MCHC 31.2 30.0 - 36.5 g/dL    RDW 15.4 (H) 11.5 - 14.5 %    PLATELET 009 927 - 975 K/uL    NEUTROPHILS 73 32 - 75 %    BAND NEUTROPHILS 6 0 - 6 %    LYMPHOCYTES 8 (L) 12 - 49 %    MONOCYTES 8 5 - 13 %    EOSINOPHILS 1 0 - 7 %    BASOPHILS 1 0 - 1 %    METAMYELOCYTES 3 (H) 0 %    ABS. NEUTROPHILS 10.3 (H) 1.8 - 8.0 K/UL    ABS. LYMPHOCYTES 1.0 0.8 - 3.5 K/UL    ABS. MONOCYTES 1.0 0.0 - 1.0 K/UL    ABS. EOSINOPHILS 0.1 0.0 - 0.4 K/UL    ABS.  BASOPHILS 0.1 0.0 - 0.1 K/UL    DF MANUAL      RBC COMMENTS POLYCHROMASIA  1+        WBC COMMENTS TOXIC GRANULATION     METABOLIC PANEL, COMPREHENSIVE    Collection Time: 08/29/17  5:01 AM   Result Value Ref Range Sodium 140 136 - 145 mmol/L    Potassium 3.5 3.5 - 5.1 mmol/L    Chloride 106 97 - 108 mmol/L    CO2 27 21 - 32 mmol/L    Anion gap 7 5 - 15 mmol/L    Glucose 123 (H) 65 - 100 mg/dL    BUN 9 6 - 20 MG/DL    Creatinine 0.86 0.55 - 1.02 MG/DL    BUN/Creatinine ratio 10 (L) 12 - 20      GFR est AA >60 >60 ml/min/1.73m2    GFR est non-AA >60 >60 ml/min/1.73m2    Calcium 8.2 (L) 8.5 - 10.1 MG/DL    Bilirubin, total 0.5 0.2 - 1.0 MG/DL    ALT (SGPT) 11 (L) 12 - 78 U/L    AST (SGOT) 14 (L) 15 - 37 U/L    Alk. phosphatase 158 (H) 45 - 117 U/L    Protein, total 5.4 (L) 6.4 - 8.2 g/dL    Albumin 1.4 (L) 3.5 - 5.0 g/dL    Globulin 4.0 2.0 - 4.0 g/dL    A-G Ratio 0.4 (L) 1.1 - 2.2         Assessment:     Principal Problem:    Knee pain, left (8/19/2017)    Active Problems:    Essential hypertension (1/4/2017)      Crohn's disease of both small and large intestine without complication ([address] (1/9/0722)      Anemia (8/19/2017)      Debilitated patient (8/19/2017)      Obese (8/19/2017)      UTI (urinary tract infection) (8/19/2017)      Septic arthritis of knee, left ([address] (8/21/2017)        Plan:     1. Increase azathioprine dose  2.  miralax  3. From GI point of view can transfer intermediate care facility. She understands request an outpatient visit if abdominal symptoms recur.   Otherwise follow up after return to home status

## 2017-08-29 NOTE — PROGRESS NOTES
Problem: Mobility Impaired (Adult and Pediatric)  Goal: *Acute Goals and Plan of Care (Insert Text)  Physical Therapy Goals  Initiated 8/21/2017Weekly Reassessment 8/28/17 (all goals ongoing)  1. Patient will move from supine to sit and sit to supine in bed with independence within 7 day(s). 2. Patient will transfer from bed to chair and chair to bed with modified independence using the least restrictive device within 7 day(s). 3. Patient will perform sit to stand with modified independence within 7 day(s). 4. Patient will ambulate with modified independence for 150 feet with the least restrictive device within 7 day(s). 5. Patient will ascend/descend 4 stairs with 1 handrail(s) with modified independence within 7 day(s). 6. Patient will be independent with HEP for knees within 7 days. PHYSICAL THERAPY TREATMENT  Patient: Zeny Born (42 y.o. female)  Date: 8/29/2017  Diagnosis: Knee pain, left  LEFT KNEE INFECTION  Septic arthritis of knee, left (HCC) Knee pain, left  Procedure(s) (LRB):  KNEE ARTHROSCOPY INCISION AND DRAINAGE OF LEFT AND RIGHT KNEE (Left) 9 Days Post-Op  Precautions: Fall, WBAT      ASSESSMENT:  Patient agreeable to PT and rated pain 3/10 at start of PT session. She performed bed exercises including ankle pumps, quad sets, heel slides, SAQ and SLR with min/CGA. Patient gets to edge of bed with modified independence. She then stood with +2 mod assist. Once up, she ambulated with min assist of 1 for 20 feet, 2nd person for IV pole. Patient complained of increased pain with all activity and ROM limited by pain and swelling. Gait is slow. Patient tearful off and on during PT session. Refused to sit in chair due to pain. Returned to supine with min/mod assist for LE's. Slow progress. Needs a lot of encouragement. Awaiting SNF.   Progression toward goals:  [ ]    Improving appropriately and progressing toward goals  [X]    Improving slowly and progressing toward goals  [ ]    Not making progress toward goals and plan of care will be adjusted       PLAN:  Patient continues to benefit from skilled intervention to address the above impairments. Continue treatment per established plan of care. Discharge Recommendations:  Christopher Larios  Further Equipment Recommendations for Discharge:  none       SUBJECTIVE:   Patient stated This is ridiculous.       OBJECTIVE DATA SUMMARY:   Critical Behavior:  Neurologic State: Alert, Appropriate for age  Orientation Level: Oriented to person, Oriented to place, Oriented to situation, Oriented to time  Cognition: Appropriate for age attention/concentration, Follows commands  Safety/Judgement: Awareness of environment, Home safety, Fall prevention  Functional Mobility Training:  Bed Mobility:     Supine to Sit: Additional time;Modified independent  Sit to Supine: Additional time;Minimum assistance           Transfers:  Sit to Stand: Assist x2; Moderate assistance  Stand to Sit: Assist x2;Minimum assistance                             Balance:  Sitting: Intact  Standing: Intact; With support  Ambulation/Gait Training:  Distance (ft): 20 Feet (ft)  Assistive Device: Gait belt;Walker, rolling  Ambulation - Level of Assistance: Minimal assistance;Assist x2        Gait Abnormalities: Decreased step clearance; Step to gait  Right Side Weight Bearing: As tolerated  Left Side Weight Bearing: As tolerated  Base of Support: Widened     Speed/Astrid: Pace decreased (<100 feet/min)  Step Length: Left shortened;Right shortened                                                          Therapeutic Exercises:   See above. Pain:  Pain Scale 1: Numeric (0 - 10)  Pain Intensity 1: 6  Pain Location 1: Knee  Pain Orientation 1: Right  Activity Tolerance:   Limited by pain and swelling. Please refer to the flowsheet for vital signs taken during this treatment.   After treatment:   [ ]    Patient left in no apparent distress sitting up in chair  [X]    Patient left in no apparent distress in bed  [X]    Call bell left within reach  [X]    Nursing notified  [ ]    Caregiver present  [ ]    Bed alarm activated      COMMUNICATION/COLLABORATION:   The patients plan of care was discussed with: Certified Occupational Therapy Assistant and Certified Nursing Assistant/Patient Care Technician and Physician.      Candyce Snellen, PT   Time Calculation: 25 mins

## 2017-08-29 NOTE — PROGRESS NOTES
Orthopaedic Progress Note  Post Op day: 9 Days Post-Op    August 29, 2017 11:01 AM     Patient: Zoya Ayala MRN: [id number]  SSN: [id number]    YOB: 1965  Age: 50 y.o. Sex: female      Admit date:  8/19/2017  Date of Surgery:  8/20/2017   Procedures:  Procedure(s):  KNEE ARTHROSCOPY INCISION AND DRAINAGE OF LEFT AND RIGHT KNEE  Admitting Physician:  Prasad Cintron MD   Surgeon:  Kalani Xavier) and Role:     * Prasad Cintron MD - Primary    Consulting Physician(s): Treatment Team: Attending Provider: Prasad Cintron MD; Nurse Practitioner: Anne Bearden NP; Consulting Provider: Roshan Ulloa NP; Consulting Provider: Prasad Cintron MD; Utilization Review: Mary Underwood RN; Consulting Provider: Suzanne Vogel MD; Utilization Review: Isaiah Nesbitt RN; Care Manager: Verito Wilhelm; Consulting Provider: Livan Valle MD; Consulting Provider: Jen Abarca MD; Consulting Provider: Sheron Henao MD    SUBJECTIVE:     Zoya Ayala is a 50 y.o. female is 9 Days Post-Op s/p Procedure(s):  KNEE ARTHROSCOPY INCISION AND DRAINAGE OF LEFT AND RIGHT KNEE with moderate complaints of pain in knees bilaterally. Pt complains of knees, shoulders, neck being stiff. Pt has had minimal time out of bed with PT. PICC line to be placed today. Rheumatology has seen patient. Agree with long tern IV abx. Concern for Crohn's also causing inflammatory arthropathy. Biologics to be restarted after infection treated. OBJECTIVE:       Physical Exam:  General: Alert, cooperative, obvious distress with any range of motion of knees. Moriah Mahesh Respiratory: Respirations unlabored  Neurological:  Neurovascular exam within normal limits. Motor: + DF/PF. Musculoskeletal: Right knee with mild effusion. Left knee no effusion. No erythema bilaterally,. No drainage from portal sites.  Significant pain with ROM of knees bilateral. Minimal flexion in left knee (10-20 degrees) Right knee similar with flexion to 15-25 degrees. Dressing/Wound:  Clean, dry and intact. No significant erythema or swelling. Vital Signs:      Patient Vitals for the past 8 hrs:   BP Temp Pulse Resp SpO2   17 0735 142/89 97.9 °F (36.6 °C) 81 12 98 %   17 0438 134/88 98.5 °F (36.9 °C) 93 16 98 %                                          Temp (24hrs), Av.9 °F (36.6 °C), Min:97.6 °F (36.4 °C), Max:98.5 °F (36.9 °C)      Labs:        Recent Labs      17   0501   HCT  26.6*   HGB  8.3*     Lab Results   Component Value Date/Time    Sodium 140 2017 05:01 AM    Potassium 3.5 2017 05:01 AM    Chloride 106 2017 05:01 AM    CO2 27 2017 05:01 AM    Glucose 123 2017 05:01 AM    BUN 9 2017 05:01 AM    Creatinine 0.86 2017 05:01 AM    Calcium 8.2 2017 05:01 AM       PT/OT:        Gait  Base of Support: Widened  Speed/Astrid: Pace decreased (<100 feet/min)  Step Length: Right shortened, Left shortened  Gait Abnormalities: Step to gait, Decreased step clearance  Ambulation - Level of Assistance: Minimal assistance  Distance (ft): 20 Feet (ft)  Assistive Device: Gait belt, Walker, rolling       Patient mobility  Bed Mobility Training  Rolling:  Moderate assistance, Assist x1 (LE supported)  Supine to Sit: Additional time, Modified independent  Sit to Supine: Minimum assistance  Transfer Training  Sit to Stand: Assist x2, Moderate assistance  Stand to Sit: Assist x2, Minimum assistance  Bed to Chair: Assist x2, Minimum assistance      Gait Training  Assistive Device: Gait belt, Walker, rolling  Ambulation - Level of Assistance: Minimal assistance  Distance (ft): 20 Feet (ft)   Weight Bearing Status  Right Side Weight Bearing: As tolerated  Left Side Weight Bearing: As tolerated        ASSESSMENT / PLAN:   Principal Problem:    Knee pain, left (2017)    Active Problems:    Essential hypertension (2017)      Crohn's disease of both small and large intestine without complication ([facility] [address] (9/8/4592)      Anemia (8/19/2017)      Debilitated patient (8/19/2017)      Obese (8/19/2017)      UTI (urinary tract infection) (8/19/2017)      Septic arthritis of knee, left ([facility] [address] (8/21/2017)                    Pain Control: Palliative care consulted for management; unable to tolerate large doses of narcotics due to lethargy, but, smaller amounts do not control pain. Palliative managing. Encourage PT/ OT     DVT Prophylaxis: Mechanical DVT proph. Therapy/ Weight bearing: WBAT   Wound/ incisional issues: CDI   Medical concerns: Anemia- stable, fb medicine  Crohn's- stable, fb GI     Disposition: Awaiting SNF acceptance. Needs PICC line. Signed By:  Brandi Segundo NP    [address] [address]  Pgr.  [phone]

## 2017-08-29 NOTE — PROGRESS NOTES
8/29/2017 2:32 PM [facility] has received Lily Quiroz, discussed with Ortho NP, planning for discharge on 8/30. Discussed with pt and pt is in agreement for discharge on 8/30. Brooke Davidson in admissions at [facility] confirmed they can accept pt on 8/30.    8/29/2017  8:29 AM [facility] has denied pt. CM called and lvm with [facility] admissions requesting auth be started. CM will follow up.     8/29/2017 8:01 AM Called and lvm with admissions at [facility]. [facility] is pt's preferred facility and there is no admission decision at this time. CM will follow up.  JEOVANY Panda

## 2017-08-29 NOTE — ROUTINE PROCESS
Orthopedic & Surgical Nursing Communication Tool      7:35 AM  8/29/2017     Bedside and Verbal shift change report given to Nisha Estrella RN (incoming nurse) by Kristan Aldana RN (outgoing nurse) on Zack Shed a 50 y.o. female who was admitted on 8/19/2017 10:14 AM. Report included the following information SBAR, Kardex and Recent Results. Opportunity for questions and clarifications were given to the incoming nurse. Patient's bed is in low position, side rails x2, door open PRN, call bell within reach and patient not in distress.     Kristan Aldana RN

## 2017-08-29 NOTE — PROGRESS NOTES
Palliative Medicine Consult  [place]: [phone] ([phone])    Patient Name: Rose Zaragoza  YOB: 1965    Date of Initial Consult: 8/28/17  Reason for Consult: Overwhelming symptoms  Requesting Provider: Dr. Carla Ramos   Primary Care Physician: Lotus Mckenna MD      SUMMARY:   Rose Zaragoza is a 50 y.o. with a past history of Crohns, who was admitted on 8/19/2017 from home with a diagnosis of left septic knee. Current medical issues leading to Palliative Medicine involvement include: Overwhelming symptoms/pain management. Chart reviewed/HPI-patient with crohns disease and had been off medications for almost 1 year until symptoms started back in June 2017. Has been back on imuran and asacol. Had colonoscopy august 14 with the following results Ileum is normal  Entire colon has variable sized and depth ulceration with purulent exudate  Sigmoid colon also has diverticulosis with edema, submucosal hemorrhage and purulent exudate from the diverticulosis in this area. Fistula seen at anus  Anorectal sessile polyp    During first hospitalization in August, patient with severe right knee pain-had steroid injection and felt much better. After discharge, patient returned with severe left knee pain and now appears to have b/l septic arthritis of left knee. Patient never had this issue before in her knees and never been on chronic opioids     reviewed-no medications noted from the last year       PALLIATIVE DIAGNOSES:   1. B/L knee pain  2. Septic arthritis  3. Crohns disease  4. Debility       PLAN:   1. Met with patient. Continues to have terrible pain and unable to bend knee or fully participate with therapy. She still states oxycodone 5 mg makes her sleepy but that is the only dose she has taken in last 24 hours. 2. This is a complicated situation. Discussed with Jamar Mtz(NP, ortho) earlier in the day and this amount of pain is very unusual given the surgical procedure she has had.  I do wonder if there remains an inflammatory component related to her Crohns disease because by exam, she appears to be healing well. 3. Will continue Tylenol 650 mg po every 4 hours  4. Will start oxycodone 2.5 every 4 hours scheduled. If this is helpful and she tolerates, would consider long acting opioids such as Oxycontin 10 mg po bid. Hopefully this will allow her to participate in therapy but not make her so lethargic  5. Will talk with Ortho tomorrow to see if maybe short course of decadron would be allowed given her septic knee. This may help with antiinflammatory effect but I worry about using this in a patient with an infection. 6. Continue oxycodone 5 mg every 4 hours for moderate pain and morphine IV for severe pain  7. Patient already on NSAID with naprosyn 500 mg bid  8. No issues with constipation secondary to her Crohns but did not have a bowel movement yesterday. 9. Patient moved her from [place] 1 year ago for a job. Lives with her cousin and friend. Works for [employer] and they have supportive in her situation. Her mom still lives in [place] and sister in [place]  8. Initial consult note routed to primary continuity provider  11.  Communicated plan of care with: Palliative IDT       GOALS OF CARE / TREATMENT PREFERENCES:   [====Goals of Care====]  GOALS OF CARE:  Patient / health care proxy stated goals: better pain control so she can go home      TREATMENT PREFERENCES:   Code Status: Full Code    Advance Care Planning:  Advance Care Planning 8/23/2017   Patient's Healthcare Decision Maker is: Legal Next of Kin   Confirm Advance Directive Yes, not on file       Other:    The palliative care team has discussed with patient / health care proxy about goals of care / treatment preferences for patient.  [====Goals of Care====]         HISTORY:     History obtained from: chart, patient    CHIEF COMPLAINT: knee pain    HPI/SUBJECTIVE:    The patient is:   [x] Verbal and participatory  [] Non-participatory due to: Patient was up with PT when I entered the room but still very limited with any flexion of the knee    Clinical Pain Assessment (nonverbal scale for severity on nonverbal patients):   [++++ Clinical Pain Assessment++++]  [++++Pain Severity++++]: Pain: 7  [++++Pain Character++++]: throbbing  [++++Pain Duration++++]: days  [++++Pain Effect++++]: limits walking  [++++Pain Factors++++]: worse with ambulation  [++++Pain Frequency++++]: intermittent  [++++Pain Location++++]: both knees  [++++ Clinical Pain Assessment++++]  Duration: for how long has pt been experiencing pain (e.g., 2 days, 1 month, years)  Frequency: how often pain is an issue (e.g., several times per day, once every few days, constant)     FUNCTIONAL ASSESSMENT:     Palliative Performance Scale (PPS):  PPS: 80       PSYCHOSOCIAL/SPIRITUAL SCREENING:     Advance Care Planning:  Advance Care Planning 8/23/2017   Patient's Healthcare Decision Maker is: Legal Next of Kin   Confirm Advance Directive Yes, not on file        Any spiritual / Jew concerns:  [] Yes /  [x] No    Caregiver Burnout:  [] Yes /  [] No /  [x] No Caregiver Present      Anticipatory grief assessment:   [x] Normal  / [] Maladaptive       ESAS Anxiety: Anxiety: 1    ESAS Depression: Depression: 0        REVIEW OF SYSTEMS:     Positive and pertinent negative findings in ROS are noted above in HPI. The following systems were [x] reviewed / [] unable to be reviewed as noted in HPI  Other findings are noted below. Systems: constitutional, ears/nose/mouth/throat, respiratory, gastrointestinal, genitourinary, musculoskeletal, integumentary, neurologic, psychiatric, endocrine. Positive findings noted below.   Modified ESAS Completed by: provider   Fatigue: 3 Drowsiness: 5   Depression: 0 Pain: 7   Anxiety: 1 Nausea: 0   Anorexia: 2 Dyspnea: 0     Constipation: No     Stool Occurrence(s): 1        PHYSICAL EXAM:     From RN flowsheet:  Wt Readings from Last 3 Encounters:   08/19/17 210 lb (95.3 kg)   08/12/17 210 lb (95.3 kg)   08/11/17 212 lb (96.2 kg)     Blood pressure 142/89, pulse 86, temperature 98.4 °F (36.9 °C), resp. rate 19, height 5' 6\" (1.676 m), weight 210 lb (95.3 kg), last menstrual period 07/12/2017, SpO2 100 %, not currently breastfeeding.     Pain Scale 1: Numeric (0 - 10)  Pain Intensity 1: 5  Pain Onset 1: acute  Pain Location 1: Knee  Pain Orientation 1: Right  Pain Description 1: Aching  Pain Intervention(s) 1: Medication (see MAR)  Last bowel movement, if known:     Constitutional: alert and oriented, appears comfortable  Eyes: pupils equal, anicteric  ENMT: no nasal discharge, moist mucous membranes  Cardiovascular: regular rhythm, distal pulses intact  Respiratory: breathing not labored, symmetric  Gastrointestinal: soft slight diffuse ttp  Musculoskeletal: both knees with band aids from surgery, but no redness, swelling or warmth, Very limited ROM to both knees  Skin: warm, dry  Neurologic: following commands, moving all extremities  Psychiatric: full affect, no hallucinations  Other:       HISTORY:     Principal Problem:    Knee pain, left (8/19/2017)    Active Problems:    Essential hypertension (1/4/2017)      Crohn's disease of both small and large intestine without complication ([address] (2/1/1727)      Anemia (8/19/2017)      Debilitated patient (8/19/2017)      Obese (8/19/2017)      UTI (urinary tract infection) (8/19/2017)      Septic arthritis of knee, left ([address] (8/21/2017)      Past Medical History:   Diagnosis Date    Crohn's colitis ([address]     History of vascular access device 08/29/2017    [facility] VAT - 5 FR double PICC, R basilic, 41 cm (2 cm out), LTA    Hypertension     Ill-defined condition     history of  ulcerative colitis      Past Surgical History:   Procedure Laterality Date    COLONOSCOPY N/A 8/14/2017    COLONOSCOPY performed by Juan Carrillo MD at [facility] HX APPENDECTOMY      1993    HX CHOLECYSTECTOMY N/A     HX TONSIL AND ADENOIDECTOMY Bilateral     HX TONSIL AND ADENOIDECTOMY      HX WISDOM TEETH EXTRACTION        History reviewed. No pertinent family history. History reviewed, no pertinent family history.   Social History   Substance Use Topics    Smoking status: Never Smoker    Smokeless tobacco: Never Used      Comment: some smoking in college    Alcohol use 1.8 oz/week     3 Glasses of wine per week     Allergies   Allergen Reactions    Codeine Nausea and Vomiting    Sulfa (Sulfonamide Antibiotics) Other (comments)     Makes face flush      Current Facility-Administered Medications   Medication Dose Route Frequency    polyethylene glycol (MIRALAX) packet 17 g  17 g Oral DAILY    azaTHIOprine (IMURAN) tablet 150 mg  150 mg Oral DAILY AFTER BREAKFAST    folic acid-vit T7-ABQ T55 (FOLTX) 2.5-25-2 mg tablet 1 Tab  1 Tab Oral DAILY    gabapentin (NEURONTIN) capsule 100 mg  100 mg Oral TID    alteplase (CATHFLO) 1 mg in sterile water (preservative free) 1 mL injection  1 mg InterCATHeter PRN    sodium chloride (NS) flush 10-30 mL  10-30 mL InterCATHeter PRN    sodium chloride (NS) flush 10 mL  10 mL InterCATHeter Q24H    sodium chloride (NS) flush 10 mL  10 mL InterCATHeter PRN    sodium chloride (NS) flush 10-40 mL  10-40 mL InterCATHeter Q8H    sodium chloride (NS) flush 20 mL  20 mL InterCATHeter Q24H    oxyCODONE IR (ROXICODONE) tablet 2.5 mg  2.5 mg Oral Q4H    iron sucrose (VENOFER) 100 mg in 0.9% sodium chloride 100 mL IVPB  100 mg IntraVENous Q24H    metaxalone (SKELAXIN) tablet 800 mg  800 mg Oral TID PRN    acetaminophen (TYLENOL) tablet 650 mg  650 mg Oral Q4H    oxyCODONE IR (ROXICODONE) tablet 5 mg  5 mg Oral Q4H PRN    DAPTOmycin (CUBICIN) 600 mg in sterile water (preservative free) 12 mL IV syringe RF formulation  600 mg IntraVENous Q24H    meropenem (MERREM) 500 mg in 0.9% sodium chloride (MBP/ADV) 50 mL  500 mg IntraVENous Q6H    nystatin (MYCOSTATIN) 100,000 unit/mL oral suspension 500,000 Units  500,000 Units Oral QID    fluconazole (DIFLUCAN) tablet 100 mg  100 mg Oral DAILY    mesalamine (DELZICOL) DR capsule 800 mg  800 mg Oral TID    0.9% sodium chloride infusion 250 mL  250 mL IntraVENous PRN    losartan (COZAAR) tablet 25 mg  25 mg Oral DAILY    diphenhydrAMINE (BENADRYL) injection 12.5 mg  12.5 mg IntraVENous Q4H PRN    morphine injection 2 mg  2 mg IntraVENous Q4H PRN    ondansetron (ZOFRAN) injection 4 mg  4 mg IntraVENous Q4H PRN    sodium chloride (NS) flush 5-10 mL  5-10 mL IntraVENous Q8H    sodium chloride (NS) flush 5-10 mL  5-10 mL IntraVENous Q8H    sodium chloride (NS) flush 5-10 mL  5-10 mL IntraVENous PRN    naloxone (NARCAN) injection 0.4 mg  0.4 mg IntraVENous PRN    diphenhydrAMINE (BENADRYL) capsule 25 mg  25 mg Oral Q4H PRN    ondansetron (ZOFRAN ODT) tablet 4 mg  4 mg Oral Q4H PRN    docusate sodium (COLACE) capsule 100 mg  100 mg Oral BID    naproxen (NAPROSYN) tablet 500 mg  500 mg Oral BID WITH MEALS          LAB AND IMAGING FINDINGS:     Lab Results   Component Value Date/Time    WBC 13.1 08/29/2017 05:01 AM    HGB 8.3 08/29/2017 05:01 AM    PLATELET 079 59/56/7355 05:01 AM     Lab Results   Component Value Date/Time    Sodium 140 08/29/2017 05:01 AM    Potassium 3.5 08/29/2017 05:01 AM    Chloride 106 08/29/2017 05:01 AM    CO2 27 08/29/2017 05:01 AM    BUN 9 08/29/2017 05:01 AM    Creatinine 0.86 08/29/2017 05:01 AM    Calcium 8.2 08/29/2017 05:01 AM    Magnesium 1.8 08/28/2017 02:50 AM    Phosphorus 3.9 08/28/2017 02:50 AM      Lab Results   Component Value Date/Time    AST (SGOT) 14 08/29/2017 05:01 AM    Alk.  phosphatase 158 08/29/2017 05:01 AM    Protein, total 5.4 08/29/2017 05:01 AM    Albumin 1.4 08/29/2017 05:01 AM    Globulin 4.0 08/29/2017 05:01 AM     Lab Results   Component Value Date/Time    INR 1.1 08/11/2017 05:32 PM    Prothrombin time 11.1 08/11/2017 05:32 PM      Lab Results   Component Value Date/Time    Iron 12 08/21/2017 10:42 AM    TIBC 109 08/21/2017 10:42 AM    Iron % saturation 11 08/21/2017 10:42 AM    Ferritin 575 08/21/2017 10:42 AM      No results found for: PH, PCO2, PO2  No components found for: [place]   Lab Results   Component Value Date/Time    CK 8 08/28/2017 02:50 AM    CK - MB <1.0 08/11/2017 05:32 PM                Total time: 35  Counseling / coordination time, spent as noted above:   > 50% counseling / coordination?:     Prolonged service was provided for  []30 min   []75 min in face to face time in the presence of the patient, spent as noted above. Time Start:   Time End:   Note: this can only be billed with 09165 (initial) or 89245 (follow up). If multiple start / stop times, list each separately.

## 2017-08-29 NOTE — PROGRESS NOTES
Sravan Loredo [facility] [address]  [address] [facility], [address] [place] [address]  [phone]      Medical Progress Note      NAME: Edwige Jacobo   :  1965  MRM:  [id number]    Date/Time: 2017  10:29 AM       Assessment and Plan:     Septic arthritis of knee, left / Knee pain, left / Debilitated patient- Tolerated surgery. Ortho is attending to pain control, DVT prophylaxis and rehab decisions as usual.  ID is managing Abx. Patient tolerated surgery without complications. ID recommending extended course of invanz and dapto. She is currently on vancomycin, flagyl and ceftriaxone. Ortho did tap knee again , with improved counts, and they have no further surgical plans. Mobilize with PT/OT per ortho instructions, she is not making much progress. Needs SNF, and is medically ready. Anemia - Acute on chronic with iron deficiency. Now stable. Hb down earlier this week with acute blood loss, but improved after a transfusion. He has iron deficiency in the setting of chronic inflammation from IBD. Needs iron, but should wait on PO forms until Crohn symptoms are improved. PCP to manage.     Essential hypertension - BP stable on losartan     Thrush - Symptoms better. Continue Nystatin    Crohn's disease of both small and large intestine without complication / Diarrhea - GI is managing this issue. They re-started her azathioprine and mesalamine. I have consulted GI to ensure they see her every day, since her GI issues, diarrhea, anorexia are all limited her recovery and discharge. No more abd pain. C diff ordered, but stool never sent. Per nursing, no stool at all yesterday, in which case C diff is ruled out clinically. I see she is on Flagyl, which would cover C diff anyway. I do not think she has C diff, but will send stool for testing if stool is purely liquid. Obese - counseled on weight loss      UTI (urinary tract infection) - Enterococcus on urine culture sens to vancomycin. Antibiotics at discharge per ID consult. Subjective:     Chief Complaint:  Throat pain better. Knee pain stable. Diarrhea resolved    ROS:  (bold if positive, if negative)    DiarrheaNausea  Tolerating minimal PT Tolerating minimal Diet        Objective:     Last 24hrs VS reviewed since prior progress note. Most recent are:    Visit Vitals    /89 (BP 1 Location: Left arm, BP Patient Position: Head of bed elevated (Comment degrees))    Pulse 81    Temp 97.9 °F (36.6 °C)    Resp 12    Ht 5' 6\" (1.676 m)    Wt 95.3 kg (210 lb)    SpO2 98%    Breastfeeding No    BMI 33.89 kg/m2     SpO2 Readings from Last 6 Encounters:   08/29/17 98%   08/17/17 93%   08/11/17 98%   08/03/17 98%   01/04/17 96%    O2 Flow Rate (L/min): 2 l/min     No intake or output data in the 24 hours ending 08/29/17 0848     Physical Exam:    Gen:  Obese, frail, ill-appearing, in no acute distress  HEENT:  Pink conjunctivae, PERRL, hearing intact to voice, thrush on mucous membranes  Neck:  Supple, without masses, thyroid non-tender  Resp:  No accessory muscle use, clear breath sounds without wheezes rales or rhonchi  Card:  No murmurs, tachycardic S1, S2 without thrills, bruits or peripheral edema  Abd:  Soft, non-tender, mildly distended, normoactive bowel sounds are present, no mass  Lymph:  No cervical or inguinal adenopathy  Musc:  No cyanosis or clubbing  Skin:  No rashes or ulcers, skin turgor is reduced  Neuro:  Cranial nerves are grossly intact, general motor weakness, follows commands appropriately  Psych: Moderate insight, oriented to person, place and time, alert, anxious and depressed.     Telemetry reviewed:   normal sinus rhythm  __________________________________________________________________  Medications Reviewed: (see below)  Medications:     Current Facility-Administered Medications   Medication Dose Route Frequency    polyethylene glycol (MIRALAX) packet 17 g  17 g Oral DAILY    azaTHIOprine (IMURAN) tablet 150 mg  150 mg Oral DAILY AFTER BREAKFAST    folic acid-vit A6-FIW W09 (FOLTX) 2.5-25-2 mg tablet 1 Tab  1 Tab Oral DAILY    gabapentin (NEURONTIN) capsule 100 mg  100 mg Oral TID    iron sucrose (VENOFER) 100 mg in 0.9% sodium chloride 100 mL IVPB  100 mg IntraVENous Q24H    metaxalone (SKELAXIN) tablet 800 mg  800 mg Oral TID PRN    acetaminophen (TYLENOL) tablet 650 mg  650 mg Oral Q4H    oxyCODONE IR (ROXICODONE) tablet 2.5 mg  2.5 mg Oral Q4H PRN    oxyCODONE IR (ROXICODONE) tablet 5 mg  5 mg Oral Q4H PRN    DAPTOmycin (CUBICIN) 600 mg in sterile water (preservative free) 12 mL IV syringe RF formulation  600 mg IntraVENous Q24H    meropenem (MERREM) 500 mg in 0.9% sodium chloride (MBP/ADV) 50 mL  500 mg IntraVENous Q6H    metroNIDAZOLE (FLAGYL) tablet 500 mg  500 mg Oral TID    nystatin (MYCOSTATIN) 100,000 unit/mL oral suspension 500,000 Units  500,000 Units Oral QID    fluconazole (DIFLUCAN) tablet 100 mg  100 mg Oral DAILY    mesalamine (DELZICOL) DR capsule 800 mg  800 mg Oral TID    0.9% sodium chloride infusion 250 mL  250 mL IntraVENous PRN    losartan (COZAAR) tablet 25 mg  25 mg Oral DAILY    diphenhydrAMINE (BENADRYL) injection 12.5 mg  12.5 mg IntraVENous Q4H PRN    morphine injection 2 mg  2 mg IntraVENous Q4H PRN    ondansetron (ZOFRAN) injection 4 mg  4 mg IntraVENous Q4H PRN    sodium chloride (NS) flush 5-10 mL  5-10 mL IntraVENous Q8H    sodium chloride (NS) flush 5-10 mL  5-10 mL IntraVENous Q8H    sodium chloride (NS) flush 5-10 mL  5-10 mL IntraVENous PRN    naloxone (NARCAN) injection 0.4 mg  0.4 mg IntraVENous PRN    diphenhydrAMINE (BENADRYL) capsule 25 mg  25 mg Oral Q4H PRN    ondansetron (ZOFRAN ODT) tablet 4 mg  4 mg Oral Q4H PRN    docusate sodium (COLACE) capsule 100 mg  100 mg Oral BID    naproxen (NAPROSYN) tablet 500 mg  500 mg Oral BID WITH MEALS        Lab Data Reviewed: (see below)  Lab Review:     Recent Labs      08/29/17   0501  08/28/17 0250  08/27/17   1125   WBC  13.1*  11.0  11.1*   HGB  8.3*  8.1*  8.1*   HCT  26.6*  25.2*  25.4*   PLT  362  350  308     Recent Labs      08/29/17   0501  08/28/17   0250  08/27/17   1125   NA  140  143  143   K  3.5  3.1*  3.0*   CL  106  108  108   CO2  27  28  28   GLU  123*  112*  141*   BUN  9  7  8   CREA  0.86  0.74  0.74   CA  8.2*  8.1*  7.9*   MG   --   1.8   --    PHOS   --   3.9   --    ALB  1.4*  1.3*  1.3*   TBILI  0.5  0.5  0.4   SGOT  14*  14*  15   ALT  11*  10*  12     No results found for: GLUCPOC  No results for input(s): PH, PCO2, PO2, HCO3, FIO2 in the last 72 hours. No results for input(s): INR in the last 72 hours. No lab exists for component: INREXT, INREXT  All Micro Results     Procedure Component Value Units Date/Time    CULTURE, BODY FLUID Donah [place] STAIN [[id number]] Collected:  08/27/17 1105    Order Status:  Completed Specimen:  Synovial Fluid Updated:  08/29/17 0841     Special Requests: NO SPECIAL REQUESTS        GRAM STAIN NO WBC'S SEEN         NO ORGANISMS SEEN                 Culture performed on Fluid swab specimen     Culture result:         No growth thus far, holding 14 days.     C. DIFFICILE (DNA) [[id number]]     Order Status:  Canceled     C. DIFFICILE (DNA) [[id number]]     Order Status:  Canceled     C. DIFFICILE (DNA) [[id number]] Collected:  08/26/17 1000    Order Status:  Canceled     C. DIFFICILE (DNA) [[id number]]     Order Status:  Canceled     CULTURE, URINE [[id number]]  (Abnormal)  (Susceptibility) Collected:  08/19/17 1108    Order Status:  Completed Specimen:  Urine from Clean catch Updated:  08/22/17 1117     Special Requests: NO SPECIAL REQUESTS        [place] Count --        >100,000  COLONIES/mL       Culture result:         ENTEROCOCCUS FAECIUM GROUP D (A)    CULTURE, ANAEROBIC [[id number]] Collected:  08/20/17 0915    Order Status:  Completed Specimen:  Surgical Specimen Updated:  08/22/17 0909     Special Requests: --        RIGHT  KNEE  ASPIRATE Culture result:         No growth thus far, holding 14 days. CULTURE, BODY FLUID Dagoberto Fitzgerald [[id number]] Collected:  08/20/17 0915    Order Status:  Completed Specimen:  Surgical Specimen Updated:  08/22/17 0907     Special Requests: --        RIGHT  KNEE  ASPIRATE       GRAM STAIN 4+ WBCS SEEN         NO ORGANISMS SEEN                 Culture performed on Unspun Fluid     Culture result:         No growth thus far, holding 14 days. CULTURE, BODY FLUID Dagoberto Fitzgerald [[id number]] Collected:  08/19/17 1230    Order Status:  Completed Specimen:  Knee Fluid Updated:  08/21/17 0911     Special Requests: NO SPECIAL REQUESTS        GRAM STAIN         PLEASE REFER TO STAT GRAM STAIN     Culture result:         Culture performed on Unspun Fluid              No growth thus far, holding 14 days. CULTURE, ANAEROBIC [[id number]] Collected:  08/19/17 1230    Order Status:  Completed Specimen:  Knee  Updated:  08/21/17 0910     Special Requests: NO SPECIAL REQUESTS        Culture result:         No growth thus far, holding 14 days. Khai Andres [[id number]] Collected:  08/19/17 1230    Order Status:  Completed Specimen:  Knee Fluid Updated:  08/19/17 1820     Special Requests: NO SPECIAL REQUESTS        GRAM STAIN 3+ WBCS SEEN         NO ORGANISMS SEEN       CULTURE, URINE [[id number]]     Order Status:  Canceled Specimen:  Urine from Clean catch           I have reviewed notes of prior 24hr.     Other pertinent lab: none    Total time spent with patient: [facility] discussed with: Patient, Nursing Staff, Consultant/Specialist and >50% of time spent in counseling and coordination of care    Discussed:  Care Plan    Prophylaxis:  H2B/PPI    Disposition:  Home w/Family           ___________________________________________________    Attending Physician: Kathy Chandler MD

## 2017-08-29 NOTE — PROGRESS NOTES
Occupational Therapy Note: Pt up walking with PT and unable to stand for ADL's. She fatigues easily. Pt needs assist x 2 to stand and to transfer back to bed. Palliative care in to speak with pt. Will follow tomorrow for OT.

## 2017-08-29 NOTE — PROGRESS NOTES
NUTRITION         Diet: Regular, Ensure Enlive TID  Skin: Bilateral knee incisions, 2+ BLE edema  PO Intake: <50% meals, ~75% ONS    Estimated Daily Nutrition Requirements:  Kcals:  1816 Kcals/day (BMR(1590x1.3) -250)              Protein: 76 g (-95g/day (0.8-1.0g/kg) ActBW)             Fluid:   1800 mL    8/29: Follow-up. PICC line placed today for IV abx. GI following for Crohn's - note miralax & to increase azathioprine dose. Colace ordered daily. Watery, loose diarrhea noted. 2+ BLE edema noted. Labs/meds reviewed. Visited pt this afternoon. Continued poor to fair appetite - less than 50% of meals & ~75% of Ensure Enlive per pt. Pt agreeable to attempting meals w/ pro source, Ensure BID as snacks & Prosource pro pwd TID. Recommend discontinuing bowel regimen if appropriate. 8/25:  Pt screened for nutrition risk d/t LOS. Pt was eating ~50% of meals for the last few days, but has not eaten anything at all today. Pt states pain is affecting appetite and \"drugs\" she's on currently are making her very sleepy. Adding Ensure Enlive - chocolate with all meals until pt can eat >50% of meals. Pt states she had lost 7-10lbs prior to previous admission due to poor appetite and low PO intakes. Pt states she knows she needs to eat something but she's been in pain or asleep all day today. No chew/swallow difficulties. Pt states she \"threw up a little bit in her mouth\" a couple times today. BM are loose, watery.      Nutrition Diagnosis: Increased nutrient needs related to increased need for protein as evidenced by incisional wounds to R&L knees, 2+ BLE edema   Nutrition Intervention: General, healthful diet; commercial supplement - Ensure Enlive BID (snacks, Prosource pro pwd TID)  Goal: PO of meals >50% & ONS >75% in the next 2-4 days  Monitoring and Evaluation: PO intakes, appetite, skin integrity, wt    RD will continue to monitor and will f/u for further nutrition assessment and intervention as appropriate.     Education & Discharge Needs:   [x] Nutrition related discharge needs addressed:    [x] Supplements (on d/c instruction &/or coupons provided)    [] Education    [] No nutrition related discharge needs at this time     Cultural, Synagogue and ethnic food preferences identified    [x]None   [] Yes     Mirta Cuevas, [address] [place] [address]  Pager [phone]

## 2017-08-29 NOTE — PROGRESS NOTES
PICC Placement Note    PRE-PROCEDURE VERIFICATION  Correct Procedure: yes  Correct Site:  yes  Temperature: Temp: 98.1 °F (36.7 °C), Temperature Source: Temp Source: Oral  Recent Labs      08/29/17   0501   BUN  9   CREA  0.86   PLT  362   WBC  13.1*     Allergies: Codeine and Sulfa (sulfonamide antibiotics)  Education materials for PICC Care given: yes. See Patient Education activity for further details. PICC Booklet placed at bedside: yes    Closed Ended PICC Catheters:  Flush Lumens as Follows:  Intermittent Medication:   Flush before and after each medication with 10 ml NS. Unused Ports:  Flush every 8 hours with 10 ml NS.  TPN Ports:  Flush every 24 hours with 20 ml NS prior to hanging new bag. Blood Draws: Stop infusion, draw off and waste 10 ml of blood. Draw sample with 10cc syringe or greater. DO NOT USE VACUTAINER . Transfer with appropriate device to lab  tubes. Flush with 20 ml NS. Dressing Change:  Every 7 days, and PRN using sterile technique if integrity of dressing is compromised. Initial dressing change for central line 24-48 hours post insertion if gauze is used. Apply new dressing per policy. PROCEDURE DETAIL  Consent was obtained and all questions were answered related to risks and benefits. A double lumen PICC line was inserted, as a sterile procedure using ultrasound and modified Seldinger technique for Home IV Therapy. The following documentation is in addition to the PICC properties in the lines/airways flowsheet :  Lot #: [id number]  Lidocaine 1% administered intradermally :yes  Internal Catheter Total Length: 41 (cm) with 2 cm out  Vein Selection for PICC:right basilic  Central Line Bundle followed yes  Complication Related to Insertion:no    The placement was verified by EKG, MAX P WAVE @ 41 (cm) with 2 cm out. PER EKG PICC TIP @ C/A junction.      Line is okay to use: yes    Toni Braga RN

## 2017-08-29 NOTE — PROGRESS NOTES
Earlier this shift she was crying r/t not felling well r/t pain at patients pain she refused all mediications because she wanted to speak with Dr. Wallis See rt pain control finally patient took oxycodone fomally she ws medicated  For pain and went down to get PICC placement in right upper arm. Appetite has been fair this shift. Bedside shift change report given to Anna (oncoming nurse) by Sandra Black (offgoing nurse). Report included the following information SBAR, Kardex and MAR.

## 2017-08-30 VITALS
SYSTOLIC BLOOD PRESSURE: 138 MMHG | DIASTOLIC BLOOD PRESSURE: 76 MMHG | TEMPERATURE: 98.1 F | HEIGHT: 66 IN | WEIGHT: 210 LBS | OXYGEN SATURATION: 97 % | RESPIRATION RATE: 18 BRPM | BODY MASS INDEX: 33.75 KG/M2 | HEART RATE: 98 BPM

## 2017-08-30 LAB
BACTERIA SPEC CULT: NORMAL
BACTERIA SPEC CULT: NORMAL
GRAM STN SPEC: NORMAL
GRAM STN SPEC: NORMAL
SERVICE CMNT-IMP: NORMAL

## 2017-08-30 PROCEDURE — 74011250636 HC RX REV CODE- 250/636: Performed by: PHYSICIAN ASSISTANT

## 2017-08-30 PROCEDURE — 74011250637 HC RX REV CODE- 250/637: Performed by: FAMILY MEDICINE

## 2017-08-30 PROCEDURE — 74011250636 HC RX REV CODE- 250/636: Performed by: INTERNAL MEDICINE

## 2017-08-30 PROCEDURE — 93970 EXTREMITY STUDY: CPT

## 2017-08-30 PROCEDURE — 77030032490 HC SLV COMPR SCD KNE COVD -B

## 2017-08-30 PROCEDURE — 97535 SELF CARE MNGMENT TRAINING: CPT

## 2017-08-30 PROCEDURE — 74011000258 HC RX REV CODE- 258: Performed by: PHYSICIAN ASSISTANT

## 2017-08-30 PROCEDURE — 74011000258 HC RX REV CODE- 258: Performed by: INTERNAL MEDICINE

## 2017-08-30 PROCEDURE — 97530 THERAPEUTIC ACTIVITIES: CPT | Performed by: PHYSICAL THERAPIST

## 2017-08-30 PROCEDURE — 74011250637 HC RX REV CODE- 250/637: Performed by: NURSE PRACTITIONER

## 2017-08-30 PROCEDURE — 97116 GAIT TRAINING THERAPY: CPT | Performed by: PHYSICAL THERAPIST

## 2017-08-30 PROCEDURE — 74011250636 HC RX REV CODE- 250/636: Performed by: NURSE PRACTITIONER

## 2017-08-30 PROCEDURE — 97110 THERAPEUTIC EXERCISES: CPT

## 2017-08-30 PROCEDURE — 74011250637 HC RX REV CODE- 250/637: Performed by: INTERNAL MEDICINE

## 2017-08-30 PROCEDURE — 65270000029 HC RM PRIVATE

## 2017-08-30 RX ORDER — DOCUSATE SODIUM 100 MG/1
100 CAPSULE, LIQUID FILLED ORAL 2 TIMES DAILY
Qty: 60 CAP | Refills: 2 | Status: SHIPPED
Start: 2017-08-30 | End: 2018-04-25 | Stop reason: ALTCHOICE

## 2017-08-30 RX ORDER — AZATHIOPRINE 50 MG/1
150 TABLET ORAL
Qty: 30 TAB | Refills: 0 | Status: CANCELLED
Start: 2017-08-30

## 2017-08-30 RX ORDER — AZATHIOPRINE 50 MG/1
150 TABLET ORAL
Qty: 30 TAB | Refills: 0 | Status: SHIPPED
Start: 2017-08-30

## 2017-08-30 RX ORDER — OXYCODONE HYDROCHLORIDE 5 MG/1
5 TABLET ORAL
Qty: 40 TAB | Refills: 0 | Status: SHIPPED | OUTPATIENT
Start: 2017-08-30 | End: 2017-09-06

## 2017-08-30 RX ORDER — POLYETHYLENE GLYCOL 3350 17 G/17G
17 POWDER, FOR SOLUTION ORAL DAILY
Qty: 30 EACH | Refills: 0 | Status: SHIPPED
Start: 2017-08-30 | End: 2018-04-25 | Stop reason: ALTCHOICE

## 2017-08-30 RX ORDER — NYSTATIN 100000 [USP'U]/ML
500000 SUSPENSION ORAL 4 TIMES DAILY
Qty: 100 ML | Refills: 0 | Status: SHIPPED
Start: 2017-08-30 | End: 2017-09-04

## 2017-08-30 RX ORDER — METHYLPREDNISOLONE 4 MG/1
TABLET ORAL
Qty: 1 DOSE PACK | Refills: 0 | Status: SHIPPED | OUTPATIENT
Start: 2017-08-30 | End: 2018-04-25 | Stop reason: ALTCHOICE

## 2017-08-30 RX ORDER — ONDANSETRON 4 MG/1
4 TABLET, ORALLY DISINTEGRATING ORAL
Qty: 30 TAB | Refills: 0 | Status: SHIPPED | OUTPATIENT
Start: 2017-08-30 | End: 2018-04-25 | Stop reason: ALTCHOICE

## 2017-08-30 RX ORDER — GABAPENTIN 100 MG/1
100 CAPSULE ORAL 3 TIMES DAILY
Qty: 90 CAP | Refills: 0 | Status: CANCELLED
Start: 2017-08-30

## 2017-08-30 RX ORDER — MESALAMINE 400 MG/1
800 CAPSULE, DELAYED RELEASE ORAL 3 TIMES DAILY
Qty: 90 CAP | Refills: 0 | Status: CANCELLED
Start: 2017-08-30

## 2017-08-30 RX ORDER — MESALAMINE 400 MG/1
800 CAPSULE, DELAYED RELEASE ORAL 3 TIMES DAILY
Qty: 90 CAP | Refills: 0 | Status: SHIPPED
Start: 2017-08-30

## 2017-08-30 RX ORDER — GABAPENTIN 100 MG/1
100 CAPSULE ORAL 3 TIMES DAILY
Qty: 90 CAP | Refills: 0 | Status: SHIPPED
Start: 2017-08-30 | End: 2018-04-25 | Stop reason: ALTCHOICE

## 2017-08-30 RX ADMIN — Medication 10 ML: at 05:37

## 2017-08-30 RX ADMIN — MESALAMINE 800 MG: 400 CAPSULE, DELAYED RELEASE ORAL at 16:32

## 2017-08-30 RX ADMIN — AZATHIOPRINE 150 MG: 50 TABLET ORAL at 09:03

## 2017-08-30 RX ADMIN — FLUCONAZOLE 100 MG: 100 TABLET ORAL at 09:04

## 2017-08-30 RX ADMIN — DEXAMETHASONE SODIUM PHOSPHATE 4 MG: 4 INJECTION, SOLUTION INTRAMUSCULAR; INTRAVENOUS at 05:36

## 2017-08-30 RX ADMIN — ACETAMINOPHEN 650 MG: 325 TABLET ORAL at 16:33

## 2017-08-30 RX ADMIN — ACETAMINOPHEN 650 MG: 325 TABLET ORAL at 01:20

## 2017-08-30 RX ADMIN — DEXAMETHASONE SODIUM PHOSPHATE 4 MG: 4 INJECTION, SOLUTION INTRAMUSCULAR; INTRAVENOUS at 01:22

## 2017-08-30 RX ADMIN — ACETAMINOPHEN 650 MG: 325 TABLET ORAL at 13:03

## 2017-08-30 RX ADMIN — DEXAMETHASONE SODIUM PHOSPHATE 4 MG: 4 INJECTION, SOLUTION INTRAMUSCULAR; INTRAVENOUS at 14:35

## 2017-08-30 RX ADMIN — MEROPENEM 500 MG: 500 INJECTION, POWDER, FOR SOLUTION INTRAVENOUS at 14:35

## 2017-08-30 RX ADMIN — GABAPENTIN 100 MG: 100 CAPSULE ORAL at 16:32

## 2017-08-30 RX ADMIN — OXYCODONE HYDROCHLORIDE 2.5 MG: 5 TABLET ORAL at 14:35

## 2017-08-30 RX ADMIN — FOLIC ACID-PYRIDOXINE-CYANOCOBALAMIN TAB 2.5-25-2 MG 1 TABLET: 2.5-25-2 TAB at 09:04

## 2017-08-30 RX ADMIN — ACETAMINOPHEN 650 MG: 325 TABLET ORAL at 09:04

## 2017-08-30 RX ADMIN — MEROPENEM 500 MG: 500 INJECTION, POWDER, FOR SOLUTION INTRAVENOUS at 09:02

## 2017-08-30 RX ADMIN — OXYCODONE HYDROCHLORIDE 2.5 MG: 5 TABLET ORAL at 01:21

## 2017-08-30 RX ADMIN — NYSTATIN 500000 UNITS: 100000 SUSPENSION ORAL at 13:03

## 2017-08-30 RX ADMIN — MESALAMINE 800 MG: 400 CAPSULE, DELAYED RELEASE ORAL at 09:04

## 2017-08-30 RX ADMIN — ACETAMINOPHEN 650 MG: 325 TABLET ORAL at 05:37

## 2017-08-30 RX ADMIN — OXYCODONE HYDROCHLORIDE 2.5 MG: 5 TABLET ORAL at 05:37

## 2017-08-30 RX ADMIN — Medication 10 ML: at 14:36

## 2017-08-30 RX ADMIN — NAPROXEN 500 MG: 250 TABLET ORAL at 09:04

## 2017-08-30 RX ADMIN — OXYCODONE HYDROCHLORIDE 2.5 MG: 5 TABLET ORAL at 09:30

## 2017-08-30 RX ADMIN — MEROPENEM 500 MG: 500 INJECTION, POWDER, FOR SOLUTION INTRAVENOUS at 01:22

## 2017-08-30 RX ADMIN — GABAPENTIN 100 MG: 100 CAPSULE ORAL at 09:04

## 2017-08-30 RX ADMIN — IRON SUCROSE 100 MG: 20 INJECTION, SOLUTION INTRAVENOUS at 09:29

## 2017-08-30 RX ADMIN — LOSARTAN POTASSIUM 25 MG: 50 TABLET, FILM COATED ORAL at 09:04

## 2017-08-30 RX ADMIN — NYSTATIN 500000 UNITS: 100000 SUSPENSION ORAL at 09:04

## 2017-08-30 NOTE — PROGRESS NOTES
Palliative Medicine Consult  [place]: [phone] ([phone])    Patient Name: Parisa Ulrich  YOB: 1965    Date of Initial Consult: 8/28/17  Reason for Consult: Overwhelming symptoms  Requesting Provider: Dr. Kell Lyn   Primary Care Physician: Augie Galloway NP      SUMMARY:   Parisa Ulrich is a 50 y.o. with a past history of Crohns, who was admitted on 8/19/2017 from home with a diagnosis of left septic knee. Current medical issues leading to Palliative Medicine involvement include: Overwhelming symptoms/pain management. Chart reviewed/HPI-patient with crohns disease and had been off medications for almost 1 year until symptoms started back in June 2017. Has been back on imuran and asacol. Had colonoscopy august 14 with the following results Ileum is normal  Entire colon has variable sized and depth ulceration with purulent exudate  Sigmoid colon also has diverticulosis with edema, submucosal hemorrhage and purulent exudate from the diverticulosis in this area. Fistula seen at anus  Anorectal sessile polyp    During first hospitalization in August, patient with severe right knee pain-had steroid injection and felt much better. After discharge, patient returned with severe left knee pain and now appears to have b/l septic arthritis of left knee. Patient never had this issue before in her knees and never been on chronic opioids     reviewed-no medications noted from the last year       PALLIATIVE DIAGNOSES:   1. B/L knee pain  2. Septic arthritis  3. Crohns disease  4. Debility       PLAN:   1. Met with patient. Patient feels much better today and feels like she may have \"turned the corner\". She is going to be discharged [facility]. 2. Orthopedics did start decadron last night and plans on decadron for about 4 days. Hopefully this will be helpful as antiinflammatory effect. 3. Continue Tylenol 650 mg po every 4 hours  4.  Continue oxycodone 2.5 every 4 hours scheduled and would not add long acting opioid at this time. 5. Continue oxycodone 5 mg every 4 hours for moderate pain  6. Patient already on NSAID with naprosyn 500 mg bid  7. No issues with constipation secondary to her Crohns but did not have a bowel movement yesterday. 8. Patient moved her from [place] 1 year ago for a job. Lives with her cousin and friend. Works for [employer] and they have supportive in her situation. Her mom still lives in [place] and sister in [place]  3. Initial consult note routed to primary continuity provider  10. Communicated plan of care with: Palliative IDT       GOALS OF CARE / TREATMENT PREFERENCES:   [====Goals of Care====]  GOALS OF CARE:  Patient / health care proxy stated goals: better pain control so she can go home      TREATMENT PREFERENCES:   Code Status: Full Code    Advance Care Planning:  Advance Care Planning 8/23/2017   Patient's Healthcare Decision Maker is: Legal Next of Kin   Confirm Advance Directive Yes, not on file       Other:    The palliative care team has discussed with patient / health care proxy about goals of care / treatment preferences for patient.  [====Goals of Care====]         HISTORY:     History obtained from: chart, patient    CHIEF COMPLAINT: knee pain    HPI/SUBJECTIVE:    The patient is:   [x] Verbal and participatory  [] Non-participatory due to:   Patient was up with PT when I entered the room and seems to be moving much better this morning.    Clinical Pain Assessment (nonverbal scale for severity on nonverbal patients):   [++++ Clinical Pain Assessment++++]  [++++Pain Severity++++]: Pain: 5  [++++Pain Character++++]: throbbing  [++++Pain Duration++++]: days  [++++Pain Effect++++]: limits walking  [++++Pain Factors++++]: worse with ambulation  [++++Pain Frequency++++]: intermittent  [++++Pain Location++++]: both knees  [++++ Clinical Pain Assessment++++]  Duration: for how long has pt been experiencing pain (e.g., 2 days, 1 month, years)  Frequency: how often pain is an issue (e.g., several times per day, once every few days, constant)     FUNCTIONAL ASSESSMENT:     Palliative Performance Scale (PPS):  PPS: 80       PSYCHOSOCIAL/SPIRITUAL SCREENING:     Advance Care Planning:  Advance Care Planning 8/23/2017   Patient's Healthcare Decision Maker is: Legal Next of Kin   Confirm Advance Directive Yes, not on file        Any spiritual / Lutheran concerns:  [] Yes /  [x] No    Caregiver Burnout:  [] Yes /  [] No /  [x] No Caregiver Present      Anticipatory grief assessment:   [x] Normal  / [] Maladaptive       ESAS Anxiety: Anxiety: 1    ESAS Depression: Depression: 0        REVIEW OF SYSTEMS:     Positive and pertinent negative findings in ROS are noted above in HPI. The following systems were [x] reviewed / [] unable to be reviewed as noted in HPI  Other findings are noted below. Systems: constitutional, ears/nose/mouth/throat, respiratory, gastrointestinal, genitourinary, musculoskeletal, integumentary, neurologic, psychiatric, endocrine. Positive findings noted below. Modified ESAS Completed by: provider   Fatigue: 3 Drowsiness: 5   Depression: 0 Pain: 5   Anxiety: 1 Nausea: 0   Anorexia: 2 Dyspnea: 0     Constipation: No     Stool Occurrence(s): 1        PHYSICAL EXAM:     From RN flowsheet:  Wt Readings from Last 3 Encounters:   08/19/17 210 lb (95.3 kg)   08/12/17 210 lb (95.3 kg)   08/11/17 212 lb (96.2 kg)     Blood pressure 121/67, pulse 98, temperature 97.9 °F (36.6 °C), resp. rate 18, height 5' 6\" (1.676 m), weight 210 lb (95.3 kg), last menstrual period 07/12/2017, SpO2 98 %, not currently breastfeeding. Pain Scale 1: Numeric (0 - 10)  Pain Intensity 1: 5 (Pt stated \"when I am moving around. \")  Pain Onset 1: Acute p/o  Pain Location 1: Knee  Pain Orientation 1:  (Bilat)  Pain Description 1: Aching  Pain Intervention(s) 1: Medication (see MAR)  Last bowel movement, if known:     Constitutional: alert and oriented, appears more comfortable today  Eyes: pupils equal, anicteric  ENMT: no nasal discharge, moist mucous membranes  Cardiovascular: regular rhythm, distal pulses intact  Respiratory: breathing not labored, symmetric  Gastrointestinal: soft slight diffuse ttp  Musculoskeletal: both knees with band aids from surgery, but no redness, swelling or warmth, better  ROM to both knees  Skin: warm, dry  Neurologic: following commands, moving all extremities  Psychiatric: full affect, no hallucinations  Other:       HISTORY:     Principal Problem:    Knee pain, left (8/19/2017)    Active Problems:    Essential hypertension (1/4/2017)      Crohn's disease of both small and large intestine without complication ([facility] (2/1/9845)      Anemia (8/19/2017)      Debilitated patient (8/19/2017)      Obese (8/19/2017)      UTI (urinary tract infection) (8/19/2017)      Septic arthritis of knee, left ([facility] (8/21/2017)      Past Medical History:   Diagnosis Date    Crohn's colitis ([facility]     History of vascular access device 08/29/2017    [facility] VAT - 5 FR double PICC, R basilic, 41 cm (2 cm out), LTA    Hypertension     Ill-defined condition     history of  ulcerative colitis      Past Surgical History:   Procedure Laterality Date    COLONOSCOPY N/A 8/14/2017    COLONOSCOPY performed by Ricardo Guevara MD at [address] HX APPENDECTOMY      1993    HX CHOLECYSTECTOMY N/A     HX TONSIL AND ADENOIDECTOMY Bilateral     HX TONSIL AND ADENOIDECTOMY      HX WISDOM TEETH EXTRACTION        History reviewed. No pertinent family history. History reviewed, no pertinent family history.   Social History   Substance Use Topics    Smoking status: Never Smoker    Smokeless tobacco: Never Used      Comment: some smoking in college    Alcohol use 1.8 oz/week     3 Glasses of wine per week     Allergies   Allergen Reactions    Codeine Nausea and Vomiting    Sulfa (Sulfonamide Antibiotics) Other (comments)     Makes face flush      Current Facility-Administered Medications Medication Dose Route Frequency    polyethylene glycol (MIRALAX) packet 17 g  17 g Oral DAILY    azaTHIOprine (IMURAN) tablet 150 mg  150 mg Oral DAILY AFTER BREAKFAST    folic acid-vit E8-XHB I65 (FOLTX) 2.5-25-2 mg tablet 1 Tab  1 Tab Oral DAILY    gabapentin (NEURONTIN) capsule 100 mg  100 mg Oral TID    alteplase (CATHFLO) 1 mg in sterile water (preservative free) 1 mL injection  1 mg InterCATHeter PRN    sodium chloride (NS) flush 10-30 mL  10-30 mL InterCATHeter PRN    sodium chloride (NS) flush 10 mL  10 mL InterCATHeter Q24H    sodium chloride (NS) flush 10 mL  10 mL InterCATHeter PRN    sodium chloride (NS) flush 10-40 mL  10-40 mL InterCATHeter Q8H    sodium chloride (NS) flush 20 mL  20 mL InterCATHeter Q24H    oxyCODONE IR (ROXICODONE) tablet 2.5 mg  2.5 mg Oral Q4H    dexamethasone (DECADRON) 4 mg/mL injection 4 mg  4 mg IntraVENous Q8H    metaxalone (SKELAXIN) tablet 800 mg  800 mg Oral TID PRN    acetaminophen (TYLENOL) tablet 650 mg  650 mg Oral Q4H    oxyCODONE IR (ROXICODONE) tablet 5 mg  5 mg Oral Q4H PRN    DAPTOmycin (CUBICIN) 600 mg in sterile water (preservative free) 12 mL IV syringe RF formulation  600 mg IntraVENous Q24H    meropenem (MERREM) 500 mg in 0.9% sodium chloride (MBP/ADV) 50 mL  500 mg IntraVENous Q6H    nystatin (MYCOSTATIN) 100,000 unit/mL oral suspension 500,000 Units  500,000 Units Oral QID    fluconazole (DIFLUCAN) tablet 100 mg  100 mg Oral DAILY    mesalamine (DELZICOL) DR capsule 800 mg  800 mg Oral TID    0.9% sodium chloride infusion 250 mL  250 mL IntraVENous PRN    losartan (COZAAR) tablet 25 mg  25 mg Oral DAILY    diphenhydrAMINE (BENADRYL) injection 12.5 mg  12.5 mg IntraVENous Q4H PRN    morphine injection 2 mg  2 mg IntraVENous Q4H PRN    ondansetron (ZOFRAN) injection 4 mg  4 mg IntraVENous Q4H PRN    sodium chloride (NS) flush 5-10 mL  5-10 mL IntraVENous Q8H    sodium chloride (NS) flush 5-10 mL  5-10 mL IntraVENous Q8H    sodium chloride (NS) flush 5-10 mL  5-10 mL IntraVENous PRN    naloxone (NARCAN) injection 0.4 mg  0.4 mg IntraVENous PRN    diphenhydrAMINE (BENADRYL) capsule 25 mg  25 mg Oral Q4H PRN    ondansetron (ZOFRAN ODT) tablet 4 mg  4 mg Oral Q4H PRN    docusate sodium (COLACE) capsule 100 mg  100 mg Oral BID          LAB AND IMAGING FINDINGS:     Lab Results   Component Value Date/Time    WBC 13.1 08/29/2017 05:01 AM    HGB 8.3 08/29/2017 05:01 AM    PLATELET 758 89/41/7175 05:01 AM     Lab Results   Component Value Date/Time    Sodium 140 08/29/2017 05:01 AM    Potassium 3.5 08/29/2017 05:01 AM    Chloride 106 08/29/2017 05:01 AM    CO2 27 08/29/2017 05:01 AM    BUN 9 08/29/2017 05:01 AM    Creatinine 0.86 08/29/2017 05:01 AM    Calcium 8.2 08/29/2017 05:01 AM    Magnesium 1.8 08/28/2017 02:50 AM    Phosphorus 3.9 08/28/2017 02:50 AM      Lab Results   Component Value Date/Time    AST (SGOT) 14 08/29/2017 05:01 AM    Alk. phosphatase 158 08/29/2017 05:01 AM    Protein, total 5.4 08/29/2017 05:01 AM    Albumin 1.4 08/29/2017 05:01 AM    Globulin 4.0 08/29/2017 05:01 AM     Lab Results   Component Value Date/Time    INR 1.1 08/11/2017 05:32 PM    Prothrombin time 11.1 08/11/2017 05:32 PM      Lab Results   Component Value Date/Time    Iron 12 08/21/2017 10:42 AM    TIBC 109 08/21/2017 10:42 AM    Iron % saturation 11 08/21/2017 10:42 AM    Ferritin 575 08/21/2017 10:42 AM      No results found for: PH, PCO2, PO2  No components found for: [place]   Lab Results   Component Value Date/Time    CK 8 08/28/2017 02:50 AM    CK - MB <1.0 08/11/2017 05:32 PM                Total time: 25  Counseling / coordination time, spent as noted above:   > 50% counseling / coordination?:     Prolonged service was provided for  []30 min   []75 min in face to face time in the presence of the patient, spent as noted above. Time Start:   Time End:   Note: this can only be billed with 81900 (initial) or 50963 (follow up).   If multiple start / stop times, list each separately.

## 2017-08-30 NOTE — PROGRESS NOTES
Spoke with Jamar BOYCE and Dr. Nilton Lawrence concerning Drea Do discharge. Medication list on discharge instructions did not include the medications she has been taking while in the hospital. No IV antibiotics listed on discharge instructions. Awaiting change before discharge

## 2017-08-30 NOTE — PROGRESS NOTES
Bedside and Verbal shift change report given to [employer] (oncoming nurse) by Cedrick Contreras (offgoing nurse). Report included the following information SBAR, Kardex, Intake/Output, MAR and Recent Results.

## 2017-08-30 NOTE — PROGRESS NOTES
Problem: Self Care Deficits Care Plan (Adult)  Goal: *Acute Goals and Plan of Care (Insert Text)  Occupational Therapy Goals  Initiated 8/21/2017; Reviewed 8/28/17 and all goals remain appropriate to be met within 7 days  1. Patient will perform grooming and sponge bathing standing at the sink with supervision/set-up within 7 day(s). 2. Patient will perform lower body dressing with supervision/set-up within 7 day(s). 3. Patient will perform toilet transfer with supervision/set-up and best adaptive equipment within 7 day(s). 4. Patient will perform all aspects of toileting with modified independence within 7 day(s). 5. Patient will participate in upper extremity therapeutic exercise/activities with independence for 10 minutes within 7 day(s). 6. Patient will stand for functional activity > or = 5 minutes with < or = 5/10 pain per patient report within 7 day(7). OCCUPATIONAL THERAPY TREATMENT  Patient: Nieves Molina (16 y.o. female)  Date: 8/30/2017  Diagnosis: Knee pain, left  LEFT KNEE INFECTION  Septic arthritis of knee, left (HCC) Knee pain, left  Procedure(s) (LRB):  KNEE ARTHROSCOPY INCISION AND DRAINAGE OF LEFT AND RIGHT KNEE (Left) 10 Days Post-Op  Precautions: Fall, WBAT  Chart, occupational therapy assessment, plan of care, and goals were reviewed. ASSESSMENT:  Pt agreeable to OT. After ambulation to the bathroom she transferred to bedside commode with min assist x 2, verbal cueing for technique, hand placement. Pt needed min assist for clothing management and min assist for thorough bowel hygiene. Pt than stood at the sink and washed her hands and brushed her teeth with contact guard. Pt stood for 5 min all together for task. She returned to bed following treatment. Educated pt as to theraband exercises to perform to increase strength and endurance for functional tasks. Recommend SNF for rehab.   Progression toward goals:  [ ]          Improving appropriately and progressing toward goals  [X] Improving slowly and progressing toward goals  [ ]          Not making progress toward goals and plan of care will be adjusted       PLAN:  Patient continues to benefit from skilled intervention to address the above impairments. Continue treatment per established plan of care. Discharge Recommendations: SNF for rehab  Further Equipment Recommendations for Discharge:  None       SUBJECTIVE:   Patient stated I feel a bit better today.       OBJECTIVE DATA SUMMARY:   Cognitive/Behavioral Status:  Neurologic State: Alert  Orientation Level: Oriented to person  Cognition: Follows commands           Functional Mobility and Transfers for ADLs:              Bed Mobility:  Rolling: Modified independent  Supine to Sit: Additional time;Assist x1;Minimum assistance  Sit to Supine: Additional time;Assist x1;Minimum assistance                   Transfers:  Sit to Stand: Assist x2; Additional time; Moderate assistance        Balance:  Sitting: Intact  Standing: Intact; With support  Standing - Static: Constant support;Good  Standing - Dynamic : Fair  ADL Intervention:        Grooming  Grooming Assistance: Contact guard assistance  Washing Hands: Contact guard assistance  Brushing Teeth: Contact guard assistance (standing at sink)                             Toileting  Toileting Assistance: Minimum assistance  Bowel Hygiene: Minimum assistance (for thoroughness)  Clothing Management: Minimum assistance   Pt sits on bedside commode over toilet for toileting. Therapeutic Exercises:   Pt issued yellow theraband for UE therapeutic exercises to increase strength and endurance for functional tasks. Pain:  Pain Scale 1: Numeric (0 - 10)  Pain Intensity 1: 3  Pain Location 1: Knee  Pain Orientation 1:  (Bilat)  Pain Description 1: Aching  Pain Intervention(s) 1: Medication (see MAR)     Activity Tolerance:    Fair  Please refer to the flowsheet for vital signs taken during this treatment.   After treatment:   [ ]  Patient left in no apparent distress sitting up in chair  [X]  Patient left in no apparent distress in bed  [X]  Call bell left within reach  [ ]  Nursing notified  [ ]  Caregiver present  [ ]  Bed alarm activated      COMMUNICATION/COLLABORATION:   The patients plan of care was discussed with: Physical Therapist and Occupational Therapist     SUJATA Dennis/L  Time Calculation: 30 mins

## 2017-08-30 NOTE — PROGRESS NOTES
1545: Received call from [address] that LE duplex results are negative. CM to notify MD Santamaria. Transport resched for 1700. Will notify pt.  4604: Attempted to call report to [facility]. Will call back. 1606: TRANSFER - OUT REPORT:    Verbal report given to [employer] (name) on Sharla December  being transferred to [employer] (unit) for routine progression of care       Report consisted of patients Situation, Background, Assessment and   Recommendations(SBAR). Information from the following report(s) SBAR, Kardex, Intake/Output and Recent Results was reviewed with the receiving nurse. Opportunity for questions and clarification was provided. 1712: Discharge medications reviewed with patient and appropriate educational materials and side effects teaching were provided. Discharge medications reviewed with patient and appropriate educational materials and side effects teaching were provided.  Transport given AVS packet, RX & medication list.

## 2017-08-30 NOTE — PROGRESS NOTES
Bilateral LE venous duplex attempted. Patient is eating and asked for me to come back. Will try again shortly.

## 2017-08-30 NOTE — PROGRESS NOTES
Problem: Mobility Impaired (Adult and Pediatric)  Goal: *Acute Goals and Plan of Care (Insert Text)  Physical Therapy Goals  Initiated 8/21/2017Weekly Reassessment 8/28/17 (all goals ongoing)  1. Patient will move from supine to sit and sit to supine in bed with independence within 7 day(s). 2. Patient will transfer from bed to chair and chair to bed with modified independence using the least restrictive device within 7 day(s). 3. Patient will perform sit to stand with modified independence within 7 day(s). 4. Patient will ambulate with modified independence for 150 feet with the least restrictive device within 7 day(s). 5. Patient will ascend/descend 4 stairs with 1 handrail(s) with modified independence within 7 day(s). 6. Patient will be independent with HEP for knees within 7 days. PHYSICAL THERAPY TREATMENT  Patient: Debra Sutton (43 y.o. female)  Date: 8/30/2017  Diagnosis: Knee pain, left  LEFT KNEE INFECTION  Septic arthritis of knee, left (HCC) Knee pain, left  Procedure(s) (LRB):  KNEE ARTHROSCOPY INCISION AND DRAINAGE OF LEFT AND RIGHT KNEE (Left) 10 Days Post-Op  Precautions: Fall, WBAT  Chart, physical therapy assessment, plan of care and goals were reviewed. ASSESSMENT:  Patient is feeling better this morning. Minimal assist to come to the EOB. Moderate assist of 2 to stand at the EOB. Ambulated with a RW slowly into the bathroom. She was then able to stand at the sink to wash her hands and brush her teeth. Returned to supine. Patient has the greatest difficulty with sit to stand and stand to sit. Once up in standing she is fairly steady using a RW for support.     Progression toward goals:  [ ]      Improving appropriately and progressing toward goals  [X]      Improving slowly and progressing toward goals  [ ]      Not making progress toward goals and plan of care will be adjusted       PLAN:  Patient continues to benefit from skilled intervention to address the above impairments. Continue treatment per established plan of care. Discharge Recommendations:  Skilled Nursing Facility  Further Equipment Recommendations for Discharge:  none       SUBJECTIVE:   Patient stated You need to help me stand up.       OBJECTIVE DATA SUMMARY:   Critical Behavior:  Neurologic State: Alert  Orientation Level: Disoriented to person, Disoriented to place, Disoriented to time  Cognition: Appropriate decision making  Safety/Judgement: Awareness of environment, Home safety, Fall prevention  Functional Mobility Training:  Bed Mobility:  Rolling: Modified independent  Supine to Sit: Additional time;Assist x1;Minimum assistance  Sit to Supine: Additional time;Assist x1;Minimum assistance                       Transfers:  Sit to Stand: Assist x2; Additional time; Moderate assistance  Stand to Sit: Assist x2; Additional time;Minimum assistance                             Balance:  Sitting: Intact  Standing: Intact; With support  Standing - Static: Constant support;Good  Standing - Dynamic : Fair  Ambulation/Gait Training:  Distance (ft): 20 Feet (ft)  Assistive Device: Walker, rolling;Gait belt  Ambulation - Level of Assistance: Minimal assistance;Assist x2; Additional time        Gait Abnormalities: Decreased step clearance;Hip Hike  Right Side Weight Bearing: As tolerated  Left Side Weight Bearing: As tolerated  Base of Support: Widened     Speed/Astrid: Pace decreased (<100 feet/min)  Step Length: Left shortened;Right shortened                                         Pain:  Pain Scale 1: Numeric (0 - 10)  Pain Intensity 1: 5 (Pt stated \"when I am moving around. \")  Pain Location 1: Knee  Pain Orientation 1:  (Bilat)  Pain Description 1: Aching  Pain Intervention(s) 1: Medication (see MAR)  Activity Tolerance:   Improving. Please refer to the flowsheet for vital signs taken during this treatment.   After treatment:   [ ] Patient left in no apparent distress sitting up in chair  [X] Patient left in no apparent distress in bed  [X] Call bell left within reach  [X] Nursing notified  [ ] Caregiver present  [ ] Bed alarm activated      COMMUNICATION/COLLABORATION:   The patients plan of care was discussed with: Occupational Therapist and Registered Nurse     Elsa Chapa, PT   Time Calculation: 30 mins

## 2017-08-30 NOTE — PROGRESS NOTES
8/30/2017 4:29 PM Most recent progress notes and doppler results sent to [facility] via [employer] and added to pt's ambulance packet. 8/30/2017 3:18  in room for pt's bilateral LE venous duplex. Spoke with Payton at [address], moved ambulance transport to [address] [place] [address]. Pt's RN was made aware. 8/30/2017  11:43 AM Pt to discharge to [facility] today at [facility] via [phone]). Nursing please call report to [phone], pt is going to room 303 on the [facility]. Pt need for pcp and Dr. Elizabeth Aldana follow up noted. Discussed with pt, pt requested CM arrange her pcp follow up with [facility] on [address] [facility]. PCP follow up scheduled with Ole Neumann for 9/5 at [facility]. Nurse navigator for Ole Neumann was notified of discharge and new pt appt. Dr. Elizabeth Aldana follow up scheduled for 9/13 at 2:15PM. Pt is aware of appts and they were added to pt's avs.   Pt's avs, mars, case management consult with discharge orders, iv abx orders, scripts, and most recent progress notes were sent to [facility] via [employer] and added to pt's ambulance packet. No further discharge needs identified. CM will follow.  JEOVANY Chaney

## 2017-08-30 NOTE — PROGRESS NOTES
Paged by discharge nurse re: discharge medications. Orthopedics (attending team) did NOT complete discharge medication reconciliation, missing medications recently started by GI and missing her IV antibiotics as recommended by ID. I was told by the discharge nurse that when ortho was paged, they said they were \"only responsible for medications related to orthopedics. \"     To ensure that Ms. Perlita Skinner receives the proper medications at the SNF, I will review her discharge medications and update the list to ensure she receives Imuran and Mesalamine, as well as IV antibiotics. I will discuss this with orthopedics as well, as it is customary for the attending team / discharge provider to ensure an accurate list of medications at discharge and complete medication reconciliation.

## 2017-08-30 NOTE — PROGRESS NOTES
[address] [place] [address], [address] [place] [address]  [phone]      Medical Progress Note      NAME: Gian Israel   :  1965  MRM:  [id number]    Date/Time: 2017          Subjective:     Chief Complaint:  F/u knee pain    Chart/notes/labs/studies reviewed, patient examined at bedside. Feels much better, steroids started yesterday. No f/c. No CP or SOB. BLE edema            Objective:       Vitals:          Last 24hrs VS reviewed since prior progress note. Most recent are:    Visit Vitals    /67 (BP 1 Location: Left arm, BP Patient Position: At rest)    Pulse 98    Temp 97.9 °F (36.6 °C)    Resp 18    Ht 5' 6\" (1.676 m)    Wt 95.3 kg (210 lb)    SpO2 98%    Breastfeeding No    BMI 33.89 kg/m2     SpO2 Readings from Last 6 Encounters:   17 98%   17 93%   17 98%   17 98%   17 96%    O2 Flow Rate (L/min): 2 l/min     Intake/Output Summary (Last 24 hours) at 17 1115  Last data filed at 17 4793   Gross per 24 hour   Intake                0 ml   Output              750 ml   Net             -750 ml          Exam:     Physical Exam:    Gen:  Well-developed, well-nourished, in no acute distress  HEENT:  Sclerae nonicteric, hearing intact to voice, mucous membranes moist  Neck:  Supple, without masses. Resp:  No accessory muscle use, CTAB without wheezes, rales, or rhonchi  Card: RRR, without m/r/g. 2+ BLE edema. Abd:  +bowel sounds, soft, NTTP, nondistended. Neuro: Face symmetric, tongue midline, speech fluent, follows commands appropriately  Psych:  Alert, oriented x 3.  Good insight     Medications Reviewed: (see below)    Lab Data Reviewed: (see below)    ______________________________________________________________________    Medications:     Current Facility-Administered Medications   Medication Dose Route Frequency    polyethylene glycol (MIRALAX) packet 17 g  17 g Oral DAILY    azaTHIOprine (IMURAN) tablet 150 mg  150 mg Oral DAILY AFTER BREAKFAST    folic acid-vit C4-KIERSTEN Y25 (FOLTX) 2.5-25-2 mg tablet 1 Tab  1 Tab Oral DAILY    gabapentin (NEURONTIN) capsule 100 mg  100 mg Oral TID    alteplase (CATHFLO) 1 mg in sterile water (preservative free) 1 mL injection  1 mg InterCATHeter PRN    sodium chloride (NS) flush 10-30 mL  10-30 mL InterCATHeter PRN    sodium chloride (NS) flush 10 mL  10 mL InterCATHeter Q24H    sodium chloride (NS) flush 10 mL  10 mL InterCATHeter PRN    sodium chloride (NS) flush 10-40 mL  10-40 mL InterCATHeter Q8H    sodium chloride (NS) flush 20 mL  20 mL InterCATHeter Q24H    oxyCODONE IR (ROXICODONE) tablet 2.5 mg  2.5 mg Oral Q4H    dexamethasone (DECADRON) 4 mg/mL injection 4 mg  4 mg IntraVENous Q8H    iron sucrose (VENOFER) 100 mg in 0.9% sodium chloride 100 mL IVPB  100 mg IntraVENous Q24H    metaxalone (SKELAXIN) tablet 800 mg  800 mg Oral TID PRN    acetaminophen (TYLENOL) tablet 650 mg  650 mg Oral Q4H    oxyCODONE IR (ROXICODONE) tablet 5 mg  5 mg Oral Q4H PRN    DAPTOmycin (CUBICIN) 600 mg in sterile water (preservative free) 12 mL IV syringe RF formulation  600 mg IntraVENous Q24H    meropenem (MERREM) 500 mg in 0.9% sodium chloride (MBP/ADV) 50 mL  500 mg IntraVENous Q6H    nystatin (MYCOSTATIN) 100,000 unit/mL oral suspension 500,000 Units  500,000 Units Oral QID    fluconazole (DIFLUCAN) tablet 100 mg  100 mg Oral DAILY    mesalamine (DELZICOL) DR capsule 800 mg  800 mg Oral TID    0.9% sodium chloride infusion 250 mL  250 mL IntraVENous PRN    losartan (COZAAR) tablet 25 mg  25 mg Oral DAILY    diphenhydrAMINE (BENADRYL) injection 12.5 mg  12.5 mg IntraVENous Q4H PRN    morphine injection 2 mg  2 mg IntraVENous Q4H PRN    ondansetron (ZOFRAN) injection 4 mg  4 mg IntraVENous Q4H PRN    sodium chloride (NS) flush 5-10 mL  5-10 mL IntraVENous Q8H    sodium chloride (NS) flush 5-10 mL  5-10 mL IntraVENous Q8H    sodium chloride (NS) flush 5-10 mL  5-10 mL IntraVENous PRN    naloxone (NARCAN) injection 0.4 mg  0.4 mg IntraVENous PRN    diphenhydrAMINE (BENADRYL) capsule 25 mg  25 mg Oral Q4H PRN    ondansetron (ZOFRAN ODT) tablet 4 mg  4 mg Oral Q4H PRN    docusate sodium (COLACE) capsule 100 mg  100 mg Oral BID            Lab Review:     Recent Labs      08/29/17   0501  08/28/17   0250  08/27/17   1125   WBC  13.1*  11.0  11.1*   HGB  8.3*  8.1*  8.1*   HCT  26.6*  25.2*  25.4*   PLT  362  350  308     Recent Labs      08/29/17   0501  08/28/17   0250  08/27/17   1125   NA  140  143  143   K  3.5  3.1*  3.0*   CL  106  108  108   CO2  27  28  28   GLU  123*  112*  141*   BUN  9  7  8   CREA  0.86  0.74  0.74   CA  8.2*  8.1*  7.9*   MG   --   1.8   --    PHOS   --   3.9   --    ALB  1.4*  1.3*  1.3*   SGOT  14*  14*  15   ALT  11*  10*  12     No components found for: GLPOC  No results for input(s): PH, PCO2, PO2, HCO3, FIO2 in the last 72 hours. No results for input(s): INR in the last 72 hours. No lab exists for component: INREXT  No results found for: SDES  Lab Results   Component Value Date/Time    Culture result: No growth thus far, holding 14 days. 08/27/2017 11:05 AM    Culture result: No growth thus far, holding 14 days. 08/20/2017 09:15 AM    Culture result: No growth thus far, holding 14 days. 08/20/2017 09:15 AM            Assessment / Plan:     49 yo WF w/ hx of Crohn's disease, recent diverticulitis p/w knee pain, found to have septic knee      Bilateral lower extremity edema: off Lovenox (prophylaxis) due to recent concerns for bleeding  -- check LE dopplers to rule out DVT.  Discussed with medardo Monge  -- if positive then discuss with GI re: feasibility of starting on anticoagulation  -- otherwise stable for discharge from my perspective      Septic arthritis of knee, left / Knee pain, left / Debilitated patient / knee pain: s/p I&D knee arthroscopy  -- mgmt per ortho, including pain control, PT/OT, DVT ppx, and disposition  -- on IV abx per ID  -- steroids per ortho  -- agree with close outpatient follow up       Anemia:  Acute on chronic with iron deficiency. Hb down a few days ago with acute blood loss, but improved after a transfusion and stable since then. He has ACD and not SAHARA in the setting of chronic inflammation from IBD and septic knee. -- follow Hg outpatient         Essential hypertension  -- cont losartan         Thrush: better  -- on Nystatin        Crohn's disease of both small and large intestine without complication / Diarrhea: GI following, re-started her azathioprine and mesalamine  -- mgmt per GI       Obese   -- counseled on weight loss         UTI (urinary tract infection):  Enterococcus on urine culture sens to vancomycin  -- treated with IV vanc         Total time spent with patient: 45 minutes, d/w ortho PA.  Reviewed discharge medication list and updated it to include medications started by GI and ID                 Care Plan discussed with: Patient, Nursing Staff and >50% of time spent in counseling and coordination of care    Discussed:  Care Plan and D/C Planning    Prophylaxis:  SCD's    Disposition:  SNF/LTC           ___________________________________________________    Attending Physician: Cresencio Burt MD

## 2017-08-30 NOTE — PROGRESS NOTES
Gastrointestinal Progress Note    8/30/2017    Admit Date: 8/19/2017    Subjective:   Lower extremity pain     New Complaints Today:  Notes that knee pain persists with activity and has pedal edema. Denies abdominal pain; one bowel movement overnight; denies rectal bleeding. Indicating increased oral intake without vomiting. Continued difficulty with standing.     Current Facility-Administered Medications   Medication Dose Route Frequency    polyethylene glycol (MIRALAX) packet 17 g  17 g Oral DAILY    azaTHIOprine (IMURAN) tablet 150 mg  150 mg Oral DAILY AFTER BREAKFAST    folic acid-vit H9-JGJ S94 (FOLTX) 2.5-25-2 mg tablet 1 Tab  1 Tab Oral DAILY    gabapentin (NEURONTIN) capsule 100 mg  100 mg Oral TID    alteplase (CATHFLO) 1 mg in sterile water (preservative free) 1 mL injection  1 mg InterCATHeter PRN    sodium chloride (NS) flush 10-30 mL  10-30 mL InterCATHeter PRN    sodium chloride (NS) flush 10 mL  10 mL InterCATHeter Q24H    sodium chloride (NS) flush 10 mL  10 mL InterCATHeter PRN    sodium chloride (NS) flush 10-40 mL  10-40 mL InterCATHeter Q8H    sodium chloride (NS) flush 20 mL  20 mL InterCATHeter Q24H    oxyCODONE IR (ROXICODONE) tablet 2.5 mg  2.5 mg Oral Q4H    dexamethasone (DECADRON) 4 mg/mL injection 4 mg  4 mg IntraVENous Q8H    iron sucrose (VENOFER) 100 mg in 0.9% sodium chloride 100 mL IVPB  100 mg IntraVENous Q24H    metaxalone (SKELAXIN) tablet 800 mg  800 mg Oral TID PRN    acetaminophen (TYLENOL) tablet 650 mg  650 mg Oral Q4H    oxyCODONE IR (ROXICODONE) tablet 5 mg  5 mg Oral Q4H PRN    DAPTOmycin (CUBICIN) 600 mg in sterile water (preservative free) 12 mL IV syringe RF formulation  600 mg IntraVENous Q24H    meropenem (MERREM) 500 mg in 0.9% sodium chloride (MBP/ADV) 50 mL  500 mg IntraVENous Q6H    nystatin (MYCOSTATIN) 100,000 unit/mL oral suspension 500,000 Units  500,000 Units Oral QID    fluconazole (DIFLUCAN) tablet 100 mg  100 mg Oral DAILY    mesalamine (DELZICOL) DR capsule 800 mg  800 mg Oral TID    0.9% sodium chloride infusion 250 mL  250 mL IntraVENous PRN    losartan (COZAAR) tablet 25 mg  25 mg Oral DAILY    diphenhydrAMINE (BENADRYL) injection 12.5 mg  12.5 mg IntraVENous Q4H PRN    morphine injection 2 mg  2 mg IntraVENous Q4H PRN    ondansetron (ZOFRAN) injection 4 mg  4 mg IntraVENous Q4H PRN    sodium chloride (NS) flush 5-10 mL  5-10 mL IntraVENous Q8H    sodium chloride (NS) flush 5-10 mL  5-10 mL IntraVENous Q8H    sodium chloride (NS) flush 5-10 mL  5-10 mL IntraVENous PRN    naloxone (NARCAN) injection 0.4 mg  0.4 mg IntraVENous PRN    diphenhydrAMINE (BENADRYL) capsule 25 mg  25 mg Oral Q4H PRN    ondansetron (ZOFRAN ODT) tablet 4 mg  4 mg Oral Q4H PRN    docusate sodium (COLACE) capsule 100 mg  100 mg Oral BID    naproxen (NAPROSYN) tablet 500 mg  500 mg Oral BID WITH MEALS        Objective:     Blood pressure 128/75, pulse 87, temperature 98.5 °F (36.9 °C), resp. rate 16, height 5' 6\" (1.676 m), weight 210 lb (95.3 kg), last menstrual period 07/12/2017, SpO2 95 %, not currently breastfeeding. EXAM:  GENERAL: alert, cooperative, no distress, HEART: regular rate and rhythm, S1, S2 normal, no murmur, no gallop LUNGS: chest clear, no wheezing, rales, normal symmetric air ABDOMEN:  Bowel sounds are normal, liver is not enlarged, spleen is not enlarged and nontender EXTREMITY: edema bilateral, knee wounds appear clean    Data Review    No results found for this or any previous visit (from the past 24 hour(s)).     Assessment:     Principal Problem:    Knee pain, left (8/19/2017)    Active Problems:    Essential hypertension (1/4/2017)      Crohn's disease of both small and large intestine without complication ([address] (0/5/9363)      Anemia (8/19/2017)      Debilitated patient (8/19/2017)      Obese (8/19/2017)      UTI (urinary tract infection) (8/19/2017)      Septic arthritis of knee, left ([address] (8/21/2017)        Plan:     1. Continue Imuran at 150 mg daily and Mesalamine 800 mg TID  2. Continue miralax daily and colace BID until improved activity and improved pain control  3. Agree with abx choices per ID  4. From GI point of view can transfer intermediate care facility. She understands request an outpatient visit if abdominal symptoms recur. Otherwise follow up after return to home status      Shayy Ibarra MD  Resident [facility]  08/30/17    Discussed with Dr. Nathan Vera to follow.   I have interviewed and examined patient with addendum to note above and formulation care plan    Gagan Munoz M.D.

## 2017-08-30 NOTE — PROGRESS NOTES
Orthopaedic Progress Note  Post Op day: 10 Days Post-Op    2017 10:12 AM     Patient: Rose Zaragoza MRN: [id number]  SSN: [id number]    YOB: 1965  Age: 50 y.o. Sex: female      Admit date:  2017  Date of Surgery:  2017   Procedures:  Procedure(s):  KNEE ARTHROSCOPY INCISION AND DRAINAGE OF LEFT AND RIGHT KNEE  Admitting Physician:  Mo Baker MD   Surgeon:  Carlyle Mccloud) and Role:     * Mo Baker MD - Primary    Consulting Physician(s): Treatment Team: Attending Provider: Mo Baker MD; Nurse Practitioner: Karolina Velázquez NP; Consulting Provider: Yelena Ennis NP; Consulting Provider: Mo Baker MD; Utilization Review: Danni Lewis RN; Consulting Provider: Gagan Munoz MD; Utilization Review: Lupe Payton RN; Care Manager: Anahy Scott; Consulting Provider: Bridger Mcfadden MD; Consulting Provider: Robin Raza MD; Consulting Provider: Mekhi Umana MD    SUBJECTIVE:     Rose Zaragoza is a 50 y.o. female is 10 Days Post-Op s/p Procedure(s):  KNEE ARTHROSCOPY INCISION AND DRAINAGE OF LEFT AND RIGHT KNEE with an appropriate level of post-operative pain. No complaints of nausea, vomiting, dizziness, lightheadedness, chest pain, or shortness of breath. OBJECTIVE:       Physical Exam:  General: Alert, cooperative, no distress. Respiratory: Respirations unlabored  Neurological:  Neurovascular exam within normal limits. Motor: + DF/PF. Musculoskeletal: Calves soft, supple, non-tender upon palpation. Dressing/Wound:  Clean, dry and intact. No significant erythema or swelling.       Vital Signs:      Patient Vitals for the past 8 hrs:   BP Temp Pulse Resp SpO2   17 0827 128/75 98.5 °F (36.9 °C) 87 16 95 %   17 0500 130/84 98.6 °F (37 °C) 78 20 97 %                                          Temp (24hrs), Av.4 °F (36.9 °C), Min:98.1 °F (36.7 °C), Max:98.6 °F (37 °C)      Labs:        Recent Labs 08/29/17   0501   HCT  26.6*   HGB  8.3*     Lab Results   Component Value Date/Time    Sodium 140 08/29/2017 05:01 AM    Potassium 3.5 08/29/2017 05:01 AM    Chloride 106 08/29/2017 05:01 AM    CO2 27 08/29/2017 05:01 AM    Glucose 123 08/29/2017 05:01 AM    BUN 9 08/29/2017 05:01 AM    Creatinine 0.86 08/29/2017 05:01 AM    Calcium 8.2 08/29/2017 05:01 AM       PT/OT:        Gait  Base of Support: Widened  Speed/Astrid: Pace decreased (<100 feet/min)  Step Length: Left shortened, Right shortened  Gait Abnormalities: Decreased step clearance, Step to gait  Ambulation - Level of Assistance: Minimal assistance, Assist x2  Distance (ft): 20 Feet (ft)  Assistive Device: Gait belt, Walker, rolling       Patient mobility  Bed Mobility Training  Rolling:  Moderate assistance, Assist x1 (LE supported)  Supine to Sit: Additional time, Modified independent  Sit to Supine: Additional time, Minimum assistance  Transfer Training  Sit to Stand: Assist x2, Moderate assistance  Stand to Sit: Assist x2, Minimum assistance  Bed to Chair: Assist x2, Minimum assistance      Gait Training  Assistive Device: Gait belt, Walker, rolling  Ambulation - Level of Assistance: Minimal assistance, Assist x2  Distance (ft): 20 Feet (ft)   Weight Bearing Status  Right Side Weight Bearing: As tolerated  Left Side Weight Bearing: As tolerated        ASSESSMENT / PLAN:   Principal Problem:    Knee pain, left (8/19/2017)    Active Problems:    Essential hypertension (1/4/2017)      Crohn's disease of both small and large intestine without complication ([address] (1/9/1502)      Anemia (8/19/2017)      Debilitated patient (8/19/2017)      Obese (8/19/2017)      UTI (urinary tract infection) (8/19/2017)      Septic arthritis of knee, left ([address] (8/21/2017)                    Pain Control: Adequate with oral agents   DVT Prophylaxis:  SCDs, JAMEEL Hose    Therapy/ Weight bearing: WBAT BLE's   Wound/ incisional issues: Sutures removed on 9/4/17, then place steristrips. Medical concerns: Hgb stable at 8.3. SCD's on in bed at all times. Lovenox held due to decrease hgb. Hospitalist ordering BLE US to eval for DVT's   Disposition: To [facility] today. Dr. Damir Amin agrees with plan. Follow up with Family Practice for medical eval in one week. Follow up with GI as discussed and follow up with Dr. Damir Amni with Ortho in 2 weeks. [phone]    IV abx long term per ID.       Signed By:  NINA BowenC    [id number] [facility]

## 2017-08-30 NOTE — PROGRESS NOTES
Bedside and Verbal shift change report given to [facility], RN (oncoming nurse) by Ana Laura Sterling RN (offgoing nurse). Report included the following information SBAR, Kardex, Procedure Summary, Intake/Output, MAR, Accordion and Recent Results.

## 2017-08-30 NOTE — PROGRESS NOTES
AZEEM Abbasi notified of the patient's d/c to [facility]./Nayeli Collado Manager of Case Management

## 2017-08-30 NOTE — PROGRESS NOTES
Ortho update:    Discussed today's events, patient status with Dr. Bart Camargo. After discussion with Dr. Rain Leong with Palliative medicine, Dr. Bart Camargo agrees with trail dose of low dose decadron due to components of inflammatory arthropathy. Will start decadron tonight, orders placed. (Decadron 4 mg IV Q8 x 2 days, then 2 mg Q8 x 2 days then stop). Will hold off on SNF placement until patient has better pain control.     Josie Lao NP

## 2017-08-30 NOTE — DISCHARGE INSTRUCTIONS
We hope that we have addressed all of your medical concerns. The examination and treatment you received in the Emergency Department were for an emergent problem and were not intended as complete care. It is important that you follow up with your healthcare provider(s) for ongoing care. If your symptoms worsen or do not improve as expected, and you are unable to reach your usual health care provider(s), you should return to the Emergency Department. Today's healthcare is undergoing tremendous change, and patient satisfaction surveys are one of the many tools to assess the quality of medical care. You may receive a survey from the [employer] regarding your experience in the Emergency Department. I hope that your experience has been completely positive, particularly the medical care that I provided. As such, please participate in the survey; anything less than excellent does not meet my expectations or intentions. [facility] and [facility] participate in nationally recognized quality of care measures. If your blood pressure is greater than 120/80, as reported below, we urge that you seek medical care to address the potential of high blood pressure, commonly known as hypertension. Hypertension can be hereditary or can be caused by certain medical conditions, pain, stress, or \"white coat syndrome. \"       Please make an appointment with your health care provider(s) for follow up of your Emergency Department visit. VITALS:   Patient Vitals for the past 8 hrs:   Temp Pulse Resp BP SpO2   08/19/17 1230 - - - 94/49 100 %   08/19/17 1130 - - - 103/62 100 %   08/19/17 1123 - - - 101/57 100 %   08/19/17 1045 - - - 106/47 100 %   08/19/17 1030 - - - 116/63 100 %   08/19/17 1023 98 °F (36.7 °C) 74 15 123/64 100 %   08/19/17 1022 - - - 123/64 (!) 81 %          Thank you for allowing us to provide you with medical care today.   We realize that you have many choices for your emergency care needs. Please choose us in the future for any continued health care needs. Pavan Haylee Marioginger, DANAE    [address] [facility].   Office: [phone]            Recent Results (from the past 24 hour(s))   URINALYSIS W/MICROSCOPIC    Collection Time: 08/19/17 11:08 AM   Result Value Ref Range    Color YELLOW/STRAW      Appearance CLOUDY (A) CLEAR      Specific gravity 1.016 1.003 - 1.030      pH (UA) 6.5 5.0 - 8.0      Protein NEGATIVE  NEG mg/dL    Glucose NEGATIVE  NEG mg/dL    Ketone NEGATIVE  NEG mg/dL    Bilirubin NEGATIVE  NEG      Blood MODERATE (A) NEG      Urobilinogen 0.2 0.2 - 1.0 EU/dL    Nitrites NEGATIVE  NEG      Leukocyte Esterase LARGE (A) NEG      WBC 10-20 0 - 4 /hpf    RBC 0-5 0 - 5 /hpf    Epithelial cells FEW FEW /lpf    Bacteria 1+ (A) NEG /hpf       Xr Knee Lt 3 V    Result Date: 8/19/2017  EXAM:  XR KNEE LT 3 V INDICATION:   pain left knee. No injury. COMPARISON: None. FINDINGS: Three views of the left knee demonstrate no fracture or dislocation. There is a joint effusion. There is osteoarthritis. IMPRESSION:  Osteoarthritis and effusion. No fracture. Joint Pain: Care Instructions  Your Care Instructions  Many people have small aches and pains from overuse or injury to muscles and joints. Joint injuries often happen during sports or recreation, work tasks, or projects around the home. An overuse injury can happen when you put too much stress on a joint or when you do an activity that stresses the joint over and over, such as using the computer or rowing a boat. You can take action at home to help your muscles and joints get better. You should feel better in 1 to 2 weeks, but it can take 3 months or more to heal completely. Follow-up care is a key part of your treatment and safety. Be sure to make and go to all appointments, and call your doctor if you are having problems.  It's also a good idea to know your test results and keep a list of the medicines you take. How can you care for yourself at home? · Do not put weight on the injured joint for at least a day or two. · For the first day or two after an injury, do not take hot showers or baths, and do not use hot packs. The heat could make swelling worse. · Put ice or a cold pack on the sore joint for 10 to 20 minutes at a time. Try to do this every 1 to 2 hours for the next 3 days (when you are awake) or until the swelling goes down. Put a thin cloth between the ice and your skin. · Wrap the injury in an elastic bandage. Do not wrap it too tightly because this can cause more swelling. · Prop up the sore joint on a pillow when you ice it or anytime you sit or lie down during the next 3 days. Try to keep it above the level of your heart. This will help reduce swelling. · Take an over-the-counter pain medicine, such as acetaminophen (Tylenol), ibuprofen (Advil, Motrin), or naproxen (Aleve). Read and follow all instructions on the label. · After 1 or 2 days of rest, begin moving the joint gently. While the joint is still healing, you can begin to exercise using activities that do not strain or hurt the painful joint. When should you call for help? Call your doctor now or seek immediate medical care if:  · You have signs of infection, such as:  ¨ Increased pain, swelling, warmth, and redness. ¨ Red streaks leading from the joint. ¨ A fever. Watch closely for changes in your health, and be sure to contact your doctor if:  · Your movement or symptoms are not getting better after 1 to 2 weeks of home treatment. Where can you learn more? Go to http://indira-guevara.info/. Enter P205 in the search box to learn more about \"Joint Pain: Care Instructions. \"  Current as of: March 21, 2017  Content Version: 11.3  © 5575-9870 [employer].  Care instructions adapted under license by [employer] (which disclaims liability or warranty for this information). If you have questions about a medical condition or this instruction, always ask your healthcare professional. [address] any warranty or liability for your use of this information. ORTHO:    Pain Control: Adequate with oral agents   DVT Prophylaxis:  SCDs, JAMEEL Hose    Therapy/ Weight bearing: WBAT BLE's   Wound/ incisional issues: Sutures removed on 9/4/17, then place steristrips. Medical concerns: Hgb stable at 8.3. SCD's on in bed at all times. Lovenox held due to decrease hgb. Hospitalist ordering BLE US to eval for DVT's   Disposition: To [facility] today. Follow up with Family Practice for medical eval in one week. Follow up with GI as discussed and follow up with Dr. Deepthi Claudio with Ortho in 2 weeks. [phone]    Long term IV antibiotics per Infectious Disease.

## 2017-08-30 NOTE — PROCEDURES
Edvin  *** FINAL REPORT ***    Name: Patricia Malloy  MRN: [id number]    Inpatient  : 06 May 1965  HIS Order #: [id number]  [address] [facility] Visit #: [id number]  Date: 30 Aug 2017    TYPE OF TEST: Peripheral Venous Testing    REASON FOR TEST  Limb swelling    Right Leg:-  Deep venous thrombosis:           No  Superficial venous thrombosis:    No  Deep venous insufficiency:        No  Superficial venous insufficiency: No    Left Leg:-  Deep venous thrombosis:           No  Superficial venous thrombosis:    No  Deep venous insufficiency:        No  Superficial venous insufficiency: No      INTERPRETATION/FINDINGS  PROCEDURE:  BILATERAL LE VENOUS DUPLEX. Evaluation of lower extremity veins with ultrasound (B-mode imaging,  pulsed Doppler, color Doppler). Includes the common femoral, deep  femoral, femoral, popliteal, posterior tibial, peroneal, and great  saphenous veins. FINDINGS:  General [place] scale and color flow duplex images of the veins in  both lower extremities demonstrate normal compressibility, spontaneous   and augmented flow profiles, and absence of filling defects  throughout the deep and superficial veins in both lower extremities. CONCLUSION:  Bilateral lower extremity venous duplex negative for deep   venous thrombosis or thrombophlebitis. ADDITIONAL COMMENTS    I have personally reviewed the data relevant to the interpretation of  this  study. TECHNOLOGIST: Olivia Fraga RVT  Signed: 2017 03:39 PM    PHYSICIAN: Lissette Trimble.  Paola Stewart MD  Signed: 2017 04:41 PM

## 2017-09-02 LAB
BACTERIA SPEC CULT: NORMAL
GRAM STN SPEC: NORMAL
SERVICE CMNT-IMP: NORMAL
SERVICE CMNT-IMP: NORMAL

## 2017-09-03 LAB
BACTERIA SPEC CULT: NORMAL
BACTERIA SPEC CULT: NORMAL
GRAM STN SPEC: NORMAL
SERVICE CMNT-IMP: NORMAL
SERVICE CMNT-IMP: NORMAL

## 2017-09-05 ENCOUNTER — DOCUMENTATION ONLY (OUTPATIENT)
Dept: FAMILY MEDICINE CLINIC | Age: 52
End: 2017-09-05

## 2017-09-10 LAB
BACTERIA SPEC CULT: NORMAL
GRAM STN SPEC: NORMAL
SERVICE CMNT-IMP: NORMAL

## 2017-09-11 NOTE — DISCHARGE SUMMARY
[address] [address] [place], [address] [facility]  DISCHARGE SUMMARY     Patient: Mily De Jesus                             Medical Record Number: [id number]                : 1965  Age: 50 y.o. Admit Date: 2017  Discharge Date: 2017  Admission Diagnosis: Knee pain, left  LEFT KNEE INFECTION  Septic arthritis of knee, left (HCC)   Right knee sepsis  Discharge Diagnosis: LEFT KNEE INFECTION  Procedures: Procedure(s):  KNEE ARTHROSCOPY INCISION AND DRAINAGE OF LEFT AND RIGHT KNEE  Surgeon: Luis Lara MD  Assistants:   Anesthesia: General  Complications: None     History of Present Illness:  Mily De Jesus is a 50 y.o. female with a history of radicular complaints. Despite conservative management and after clinical and radiographic evaluation, it was determined that she would benefit from Procedure(s):  KNEE ARTHROSCOPY INCISION AND DRAINAGE OF LEFT AND RIGHT KNEE, which she consented to undergo after a discussion of the risks, benefits, alternatives, and potential complications to surgery. Hospital Course:  Mily De Jesus tolerated the procedure well. She was transferred to the Orthopaedic floor from the recovery room in stable condition. On postoperative day 1, the dressing was clean and dry, she was neurovascularly intact and afebrile, and her vital signs were stable. The patients cultures never grew anything from the OR. ID recommended treating like an infected knee. However given her GI issues this could have also represented an inflamatory arthropathy. At time of discharge her pain was improved and she was wbat    Discharge Medications:  Cannot display discharge medications since this patient is not currently admitted.         Signed by: Kourtney Dalton MD  2017

## 2017-10-05 NOTE — PHYSICIAN ADVISORY
Dear Dr. Elizabeth Aldana : The insurance company has denied the inpatient status of one of your patients. Now we need you to complete a peer to [facility] review [facility] and justify your clinical decision. Only you are allowed to do this peer to peer review. You will need to do the following:    Within next Three business days    Please call [facility] at  [phone], then follow the prompt for medical management and then choose peer to peer review.  Talk to their staff and setup a peer to peer review with their doctor at a mutually convenient time    Prepare your argument to defend your decision on the basis of criteria I have provided for you - see below - next to [facility] (criteria)    Complete review with him/her    Send an email reply to me with   1. Name of doctor you did the peer to peer   2. Outcome (approved inpatient or denied)   3. Date you did the review   If you need help, you are more than welcome to talk to me anytime, call me on my cell number listed below     Policy Number :  [id number]   Reference/Auth Number Auth number: REF# [id number]     Pt Name:  Oskar Pedraza   MR#  [id number]   [facility]#   [id number]   [facility] date  8/19/2017 10:14 AM   Discharge date  8/30/2017   Discharging doctor  Deborah   Principal diagnosis  Knee pain, left        Insurance  Payor: BLUE CROSS / Plan: [facility] PPO / Product Type: PPO /      Clinicals    50 y.o. y.o. female who was treated in the acute care setting for knee pain. Infectious disease recommended that the pt should receive septic arthritis treatment.    She also had a UTI      [place]  Septic Arthritis  ORG: [id number] ([facility])   Decision    Additional comments         Sincerely     Lina Alonso MD MPH FACP   Physician Adviser, TATO Anaya    Cell: [phone]

## 2017-10-21 RX ORDER — LOSARTAN POTASSIUM 25 MG/1
TABLET ORAL
Qty: 30 TAB | Refills: 0 | Status: SHIPPED | OUTPATIENT
Start: 2017-10-21 | End: 2018-03-29 | Stop reason: SDUPTHER

## 2017-10-21 NOTE — TELEPHONE ENCOUNTER
30 days given. Patient seen by me for acute care visit. Needs follow up for chronic care visit. Habib: Please advise patient.

## 2017-11-20 RX ORDER — LOSARTAN POTASSIUM 25 MG/1
TABLET ORAL
Qty: 30 TAB | Refills: 0 | OUTPATIENT
Start: 2017-11-20

## 2017-12-11 RX ORDER — LOSARTAN POTASSIUM 25 MG/1
TABLET ORAL
Qty: 30 TAB | Refills: 0
Start: 2017-12-11

## 2018-03-27 RX ORDER — LOSARTAN POTASSIUM 25 MG/1
TABLET ORAL
Qty: 30 TAB | Refills: 0 | OUTPATIENT
Start: 2018-03-27

## 2018-03-29 RX ORDER — LOSARTAN POTASSIUM 25 MG/1
TABLET ORAL
Qty: 30 TAB | Refills: 0 | Status: SHIPPED | OUTPATIENT
Start: 2018-03-29 | End: 2018-04-25 | Stop reason: SDUPTHER

## 2018-03-29 NOTE — TELEPHONE ENCOUNTER
Pt called asking if we can call in a 1 month supply just until she can get in to see another doctor. Pt stated she has moved to [place] and needs to get another provider close to home.     [phone]

## 2018-04-25 ENCOUNTER — OFFICE VISIT (OUTPATIENT)
Dept: FAMILY MEDICINE CLINIC | Age: 53
End: 2018-04-25

## 2018-04-25 VITALS
OXYGEN SATURATION: 98 % | BODY MASS INDEX: 31.34 KG/M2 | SYSTOLIC BLOOD PRESSURE: 118 MMHG | WEIGHT: 195 LBS | RESPIRATION RATE: 18 BRPM | DIASTOLIC BLOOD PRESSURE: 89 MMHG | HEART RATE: 89 BPM | HEIGHT: 66 IN | TEMPERATURE: 98.7 F

## 2018-04-25 DIAGNOSIS — I10 ESSENTIAL HYPERTENSION: Chronic | ICD-10-CM

## 2018-04-25 DIAGNOSIS — Z00.00 WELL ADULT EXAM: Primary | ICD-10-CM

## 2018-04-25 DIAGNOSIS — Z12.31 VISIT FOR SCREENING MAMMOGRAM: ICD-10-CM

## 2018-04-25 RX ORDER — LOSARTAN POTASSIUM 25 MG/1
TABLET ORAL
Qty: 90 TAB | Refills: 3 | Status: SHIPPED | OUTPATIENT
Start: 2018-04-25

## 2018-04-25 NOTE — PROGRESS NOTES
Subjective:   50 y.o. female for Well Woman Check. She is a new patient to the practice today but not to [facility]. Her gyne and breast care is done elsewhere by her Ob-Gyne physician. Patient Active Problem List    Diagnosis Date Noted    Septic arthritis of knee, left ([address] 08/21/2017    Knee pain, left 08/19/2017    Anemia 08/19/2017    Debilitated patient 08/19/2017    Obese 08/19/2017    UTI (urinary tract infection) 08/19/2017    Essential hypertension 01/04/2017    Crohn's disease of both small and large intestine without complication (HCC) 17/94/3588     Current Outpatient Prescriptions   Medication Sig Dispense Refill    losartan (COZAAR) 25 mg tablet TAKE 1 TAB BY MOUTH DAILY FOR 90 DAYS. 90 Tab 3    azaTHIOprine (IMURAN) 50 mg tablet Take 3 Tabs by mouth daily (after breakfast). 30 Tab 0    mesalamine (DELZICOL) 400 mg cdti capsule Take 2 Caps by mouth three (3) times daily. 80 Cap 0     Family History   Problem Relation Age of Onset    Heart Disease Father      Social History   Substance Use Topics    Smoking status: Never Smoker    Smokeless tobacco: Never Used      Comment: some smoking in college    Alcohol use 1.8 oz/week     3 Glasses of wine per week             ROS: Feeling generally well. No TIA's or unusual headaches, no dysphagia. No prolonged cough. No dyspnea or chest pain on exertion. No abdominal pain, change in bowel habits, black or bloody stools. No urinary tract symptoms. No new or unusual musculoskeletal symptoms. Specific concerns today: needs refill of her cozaar. Objective: The patient appears well, alert, oriented x 3, in no distress. Visit Vitals    /89 (BP 1 Location: Left arm, BP Patient Position: Sitting)    Pulse 89    Temp 98.7 °F (37.1 °C) (Oral)    Resp 18    Ht 5' 6\" (1.676 m)    Wt 195 lb (88.5 kg)    SpO2 98%    BMI 31.47 kg/m2     ENT normal.  Neck supple. No adenopathy or thyromegaly. JEWEL.  Lungs are clear, good air entry, no wheezes, rhonchi or rales. S1 and S2 normal, no murmurs, regular rate and rhythm. Abdomen soft without tenderness, guarding, mass or organomegaly. Extremities show no edema, normal peripheral pulses. Neurological is normal, no focal findings. Breast and Pelvic exams are deferred. Assessment/Plan:   Well Woman  lose weight, increase physical activity, follow low fat diet, follow low salt diet, routine labs ordered  Encounter Diagnoses   Name Primary?  Well adult exam Yes    Essential hypertension     Visit for screening mammogram      Orders Placed This Encounter    NICKI MAMMO BI SCREENING INCL CAD    CBC WITH AUTOMATED DIFF    LIPID PANEL    METABOLIC PANEL, COMPREHENSIVE    TSH 3RD GENERATION    losartan (COZAAR) 25 mg tablet     I have discussed the diagnosis with the patient and the intended plan as seen in the above orders. The patient has received an after-visit summary and questions were answered concerning future plans. Patient conveyed understanding of the plan at the time of the visit.     Jasmine Leggett, YESI, ANP  4/25/2018

## 2018-04-25 NOTE — MR AVS SNAPSHOT
[address] [address] 
[phone] Patient: Polina Marcial MRN: [id number] :1965 Visit Information Date & Time Provider Department Dept. Phone Encounter #  
 2018  8:00 AM Elvin Paget, NP [address] [facility] [phone] [id number] Upcoming Health Maintenance Date Due  
 BREAST CANCER SCRN MAMMOGRAM 2017 Influenza Age 5 to Adult 2017 PAP AKA CERVICAL CYTOLOGY 2017 COLONOSCOPY 2020 DTaP/Tdap/Td series (2 - Td) 2022 Allergies as of 2018  Review Complete On: 2018 By: Elvin Paget, NP Severity Noted Reaction Type Reactions Codeine  2017    Nausea and Vomiting  
 Sulfa (Sulfonamide Antibiotics)  2017    Other (comments) Makes face flush Current Immunizations  Never Reviewed Name Date Influenza Vaccine Elvia Bitter) 2017 Not reviewed this visit You Were Diagnosed With   
  
 Codes Comments Well adult exam    -  Primary ICD-10-CM: Z00.00 ICD-9-CM: V70.0 Essential hypertension     ICD-10-CM: I10 
ICD-9-CM: 401.9 Visit for screening mammogram     ICD-10-CM: Z12.31 
ICD-9-CM: V76.12 Vitals BP Pulse Temp Resp Height(growth percentile) Weight(growth percentile) 118/89 (BP 1 Location: Left arm, BP Patient Position: Sitting) 89 98.7 °F (37.1 °C) (Oral) 18 5' 6\" (1.676 m) 195 lb (88.5 kg) SpO2 BMI OB Status Smoking Status 98% 31.47 kg/m2 Premenopausal Never Smoker Vitals History BMI and BSA Data Body Mass Index Body Surface Area  
 31.47 kg/m 2 2.03 m 2 Preferred Pharmacy Pharmacy Name Phone [id number], [address] [place] [phone] Your Updated Medication List  
  
   
This list is accurate as of 18  8:37 AM.  Always use your most recent med list.  
  
  
  
  
 azaTHIOprine 50 mg tablet Commonly known as:  [employer] Take 3 Tabs by mouth daily (after breakfast). losartan 25 mg tablet Commonly known as:  COZAAR  
TAKE 1 TAB BY MOUTH DAILY FOR 90 DAYS. mesalamine 400 mg Cdti capsule Commonly known as:  DELZICOL Take 2 Caps by mouth three (3) times daily. Prescriptions Sent to Pharmacy Refills  
 losartan (COZAAR) 25 mg tablet 3 Sig: TAKE 1 TAB BY MOUTH DAILY FOR 90 DAYS. Class: Normal  
 Pharmacy: [address] Ph #: [phone] We Performed the Following CBC WITH AUTOMATED DIFF [95256 CPT(R)] LIPID PANEL [28208 CPT(R)] METABOLIC PANEL, COMPREHENSIVE [99954 CPT(R)] TSH 3RD GENERATION [50017 CPT(R)] To-Do List   
 05/31/2018 Imaging:  NICKI MAMMO BI SCREENING INCL CAD Introducing [facility]! Dear Keshia Navarro: Thank you for requesting a [employer] account. Our records indicate that you already have an active [employer] account. You can access your account anytime at [email or web address] Did you know that you can access your hospital and ER discharge instructions at any time in [employer]? You can also review all of your test results from your hospital stay or ER visit. Additional Information If you have questions, please visit the Frequently Asked Questions section of the [employer] website at [email or web address] Remember, [employer] is NOT to be used for urgent needs. For medical emergencies, dial 911. Now available from your iPhone and Android! Please provide this summary of care documentation to your next provider. Your primary care clinician is listed as Cyndi Price. If you have any questions after today's visit, please call [phone].

## 2018-04-25 NOTE — PROGRESS NOTES
Chief Complaint   Patient presents with    New Patient     Establish care     Pt seen in the office today to establish care

## 2018-04-26 LAB
ALBUMIN SERPL-MCNC: 4.3 G/DL (ref 3.5–5.5)
ALBUMIN/GLOB SERPL: 2.2 {RATIO} (ref 1.2–2.2)
ALP SERPL-CCNC: 61 IU/L (ref 39–117)
ALT SERPL-CCNC: 8 IU/L (ref 0–32)
AST SERPL-CCNC: 20 IU/L (ref 0–40)
BASOPHILS # BLD AUTO: 0 X10E3/UL (ref 0–0.2)
BASOPHILS NFR BLD AUTO: 0 %
BILIRUB SERPL-MCNC: 0.8 MG/DL (ref 0–1.2)
BUN SERPL-MCNC: 11 MG/DL (ref 6–24)
BUN/CREAT SERPL: 16 (ref 9–23)
CALCIUM SERPL-MCNC: 9.2 MG/DL (ref 8.7–10.2)
CHLORIDE SERPL-SCNC: 100 MMOL/L (ref 96–106)
CHOLEST SERPL-MCNC: 186 MG/DL (ref 100–199)
CO2 SERPL-SCNC: 26 MMOL/L (ref 18–29)
CREAT SERPL-MCNC: 0.67 MG/DL (ref 0.57–1)
EOSINOPHIL # BLD AUTO: 0 X10E3/UL (ref 0–0.4)
EOSINOPHIL NFR BLD AUTO: 0 %
ERYTHROCYTE [DISTWIDTH] IN BLOOD BY AUTOMATED COUNT: 15.8 % (ref 12.3–15.4)
GFR SERPLBLD CREATININE-BSD FMLA CKD-EPI: 103 ML/MIN/1.73
GFR SERPLBLD CREATININE-BSD FMLA CKD-EPI: 119 ML/MIN/1.73
GLOBULIN SER CALC-MCNC: 2 G/DL (ref 1.5–4.5)
GLUCOSE SERPL-MCNC: 100 MG/DL (ref 65–99)
HCT VFR BLD AUTO: 33.2 % (ref 34–46.6)
HDLC SERPL-MCNC: 86 MG/DL
HGB BLD-MCNC: 11.4 G/DL (ref 11.1–15.9)
IMM GRANULOCYTES # BLD: 0 X10E3/UL (ref 0–0.1)
IMM GRANULOCYTES NFR BLD: 0 %
INTERPRETATION, 910389: NORMAL
LDLC SERPL CALC-MCNC: 88 MG/DL (ref 0–99)
LYMPHOCYTES # BLD AUTO: 0.7 X10E3/UL (ref 0.7–3.1)
LYMPHOCYTES NFR BLD AUTO: 16 %
MCH RBC QN AUTO: 39.9 PG (ref 26.6–33)
MCHC RBC AUTO-ENTMCNC: 34.3 G/DL (ref 31.5–35.7)
MCV RBC AUTO: 116 FL (ref 79–97)
MONOCYTES # BLD AUTO: 0.2 X10E3/UL (ref 0.1–0.9)
MONOCYTES NFR BLD AUTO: 5 %
NEUTROPHILS # BLD AUTO: 3.6 X10E3/UL (ref 1.4–7)
NEUTROPHILS NFR BLD AUTO: 79 %
PLATELET # BLD AUTO: 217 X10E3/UL (ref 150–379)
POTASSIUM SERPL-SCNC: 4.7 MMOL/L (ref 3.5–5.2)
PROT SERPL-MCNC: 6.3 G/DL (ref 6–8.5)
RBC # BLD AUTO: 2.86 X10E6/UL (ref 3.77–5.28)
SODIUM SERPL-SCNC: 140 MMOL/L (ref 134–144)
TRIGL SERPL-MCNC: 58 MG/DL (ref 0–149)
TSH SERPL DL<=0.005 MIU/L-ACNC: 3.51 UIU/ML (ref 0.45–4.5)
VLDLC SERPL CALC-MCNC: 12 MG/DL (ref 5–40)
WBC # BLD AUTO: 4.6 X10E3/UL (ref 3.4–10.8)

## 2018-04-27 RX ORDER — LOSARTAN POTASSIUM 25 MG/1
TABLET ORAL
Qty: 30 TAB | Refills: 0 | Status: SHIPPED | OUTPATIENT
Start: 2018-04-27

## 2018-04-27 NOTE — PROGRESS NOTES
Hey there, your labs look good overall. Your iron has improved quite a bit, it does look like your b12 may be low. I would recommend that you take 1000mg of b12 supplement daily.  Recheck 6 months, Tika

## 2018-06-11 ENCOUNTER — HOSPITAL ENCOUNTER (OUTPATIENT)
Dept: MAMMOGRAPHY | Age: 53
Discharge: HOME | End: 2018-06-11
Attending: NURSE PRACTITIONER
Payer: COMMERCIAL

## 2018-06-11 DIAGNOSIS — Z12.31 VISIT FOR SCREENING MAMMOGRAM: ICD-10-CM

## 2018-06-11 PROCEDURE — 77067 SCR MAMMO BI INCL CAD: CPT

## 2022-03-18 PROBLEM — I10 ESSENTIAL HYPERTENSION: Status: ACTIVE | Noted: 2017-01-04

## 2022-03-18 PROBLEM — K50.80 CROHN'S DISEASE OF BOTH SMALL AND LARGE INTESTINE WITHOUT COMPLICATION (HCC): Status: ACTIVE | Noted: 2017-01-04

## 2022-03-19 PROBLEM — M00.9 SEPTIC ARTHRITIS OF KNEE, LEFT (HCC): Status: ACTIVE | Noted: 2017-08-21

## 2022-03-19 PROBLEM — D64.9 ANEMIA: Status: ACTIVE | Noted: 2017-08-19

## 2022-03-19 PROBLEM — E66.9 OBESE: Status: ACTIVE | Noted: 2017-08-19

## 2022-03-19 PROBLEM — M25.562 KNEE PAIN, LEFT: Status: ACTIVE | Noted: 2017-08-19

## 2022-03-19 PROBLEM — R53.81 DEBILITATED PATIENT: Status: ACTIVE | Noted: 2017-08-19

## 2022-03-20 PROBLEM — N39.0 UTI (URINARY TRACT INFECTION): Status: ACTIVE | Noted: 2017-08-19

## 2023-01-17 ENCOUNTER — PREPPED CHART (OUTPATIENT)
Dept: URBAN - METROPOLITAN AREA CLINIC 50 | Facility: CLINIC | Age: 58
End: 2023-01-17

## 2023-05-20 RX ORDER — MESALAMINE 400 MG/1
800 CAPSULE, DELAYED RELEASE ORAL 3 TIMES DAILY
COMMUNITY
Start: 2017-08-30

## 2023-05-20 RX ORDER — AZATHIOPRINE 50 MG/1
150 TABLET ORAL
COMMUNITY
Start: 2017-08-30

## 2023-05-20 RX ORDER — LOSARTAN POTASSIUM 25 MG/1
1 TABLET ORAL DAILY
COMMUNITY
Start: 2018-04-25

## 2024-04-17 NOTE — PROGRESS NOTES
Chief Complaint   Patient presents with    New Patient     get medication refill after relocating from [facility].   referral for well woman and gabby. GOAL: Pt will ambulate 300 feet w/independently, w/use of appropriate assistive device in 2 weeks.

## (undated) DEVICE — DEVON™ KNEE AND BODY STRAP 60" X 3" (1.5 M X 7.6 CM): Brand: DEVON

## (undated) DEVICE — STERILE POLYISOPRENE POWDER-FREE SURGICAL GLOVES: Brand: PROTEXIS

## (undated) DEVICE — CONTAINER SPEC 20 ML LID NEUT BUFF FORMALIN 10 % POLYPR STS

## (undated) DEVICE — KENDALL RADIOLUCENT FOAM MONITORING ELECTRODE -RECTANGULAR SHAPE: Brand: KENDALL

## (undated) DEVICE — STERILE POLYISOPRENE POWDER-FREE SURGICAL GLOVES WITH EMOLLIENT COATING: Brand: PROTEXIS

## (undated) DEVICE — SUTURE MCRYL 2 0 L27IN ABSRB VLT CT MFIL Y351H

## (undated) DEVICE — GAUZE SPONGES,12 PLY: Brand: CURITY

## (undated) DEVICE — BAG SPEC BIOHZRD 10 X 10 IN --

## (undated) DEVICE — SNARE ENDOSCP M L240CM W27MM SHTH DIA2.4MM CHN 2.8MM OVL

## (undated) DEVICE — 4-PORT MANIFOLD: Brand: NEPTUNE 2

## (undated) DEVICE — COVER LT HNDL BLU PLAS

## (undated) DEVICE — GOWN,BREATHABLE,IMP SLV 3XL AURORA: Brand: MEDLINE

## (undated) DEVICE — Device

## (undated) DEVICE — CATH IV AUTOGRD BC PNK 20GA 25 -- INSYTE

## (undated) DEVICE — SOLUTION IRRIG 3000ML LAC R FLX CONT

## (undated) DEVICE — (D)PREP SKN CHLRAPRP APPL 26ML -- CONVERT TO ITEM 371833

## (undated) DEVICE — ARTHROSCOPY RICHMOND-LF: Brand: MEDLINE INDUSTRIES, INC.

## (undated) DEVICE — BAG BELONG PT PERS CLEAR HANDL

## (undated) DEVICE — INFECTION CONTROL KIT SYS

## (undated) DEVICE — 1200 GUARD II KIT W/5MM TUBE W/O VAC TUBE: Brand: GUARDIAN

## (undated) DEVICE — TOWEL SURG W17XL27IN STD BLU COT NONFENESTRATED PREWASHED

## (undated) DEVICE — SOLIDIFIER MEDC 1200ML -- CONVERT TO 356117

## (undated) DEVICE — TRAP SUC MUCOUS 70ML -- MEDICHOICE MEDLINE

## (undated) DEVICE — PADDING CST 4INX4YD --

## (undated) DEVICE — NON-ADHERENT STRIPS,OIL EMULSION: Brand: CURITY

## (undated) DEVICE — FORCEPS BX L240CM JAW DIA2.8MM L CAP W/ NDL MIC MESH TOOTH

## (undated) DEVICE — DYONICS 25 INFLOW TUBE SET, 3 PER BOX

## (undated) DEVICE — TUBING ADMIN SET INTRAV ARTERI -- CONVERT TO ITEM 340436

## (undated) DEVICE — KIT COLON W/ 1.1OZ LUB AND 2 END

## (undated) DEVICE — CANN NASAL O2 CAPNOGRAPHY AD -- FILTERLINE